# Patient Record
Sex: MALE | Race: ASIAN | Employment: OTHER | URBAN - METROPOLITAN AREA
[De-identification: names, ages, dates, MRNs, and addresses within clinical notes are randomized per-mention and may not be internally consistent; named-entity substitution may affect disease eponyms.]

---

## 2017-01-06 DIAGNOSIS — B35.1 ONYCHOMYCOSIS: ICD-10-CM

## 2017-01-06 DIAGNOSIS — B35.6 TINEA CRURIS: ICD-10-CM

## 2017-01-06 RX ORDER — ATORVASTATIN CALCIUM 20 MG/1
20 TABLET, FILM COATED ORAL DAILY
Qty: 90 TAB | Refills: 2 | Status: SHIPPED | OUTPATIENT
Start: 2017-01-06 | End: 2017-10-04 | Stop reason: SDUPTHER

## 2017-01-06 NOTE — TELEPHONE ENCOUNTER
This patient contacted office for the following prescriptions to be filled:    Medication requested :   Requested Prescriptions     Pending Prescriptions Disp Refills    atorvastatin (LIPITOR) 20 mg tablet       Sig: Take 1 Tab by mouth daily.        PCP: [facility] or Print: pharmacy  Mail order or Local pharmacy: [facility]    Scheduled appointment if not seen by current providers in office: last seen on 8/24/16 not due for fu until 8/2017 not scheduled

## 2017-06-01 ENCOUNTER — TELEPHONE (OUTPATIENT)
Dept: FAMILY MEDICINE CLINIC | Age: 67
End: 2017-06-01

## 2017-06-01 NOTE — TELEPHONE ENCOUNTER
Spoke with Yash Segura to inform him that I will fax all information over GL for them to schedule his colonoscopy.  Ye informed me that I took to long to call him back and he has already made his appointment with Dr. Maryann Dukes (GI)

## 2017-06-01 NOTE — TELEPHONE ENCOUNTER
Patient called this afternoon. He is requesting to get a colonoscopy done. Please call patient back at your earliest convenience.

## 2017-07-24 ENCOUNTER — TELEPHONE (OUTPATIENT)
Dept: FAMILY MEDICINE CLINIC | Age: 67
End: 2017-07-24

## 2017-07-24 DIAGNOSIS — Z12.5 PROSTATE CANCER SCREENING: Primary | ICD-10-CM

## 2017-07-24 NOTE — TELEPHONE ENCOUNTER
Pt is requesting a lab order for his physical on  8/28/2017. He would like to have the same orders that he has had in the past and the orders faxed to [phone] [facility] 2nd Watch. Please advise.

## 2017-07-25 ENCOUNTER — HOSPITAL ENCOUNTER (OUTPATIENT)
Dept: LAB | Age: 67
Discharge: HOME | End: 2017-07-25
Payer: COMMERCIAL

## 2017-07-25 ENCOUNTER — OFFICE VISIT (OUTPATIENT)
Dept: CARDIOLOGY CLINIC | Age: 67
End: 2017-07-25

## 2017-07-25 VITALS
DIASTOLIC BLOOD PRESSURE: 105 MMHG | WEIGHT: 147 LBS | HEART RATE: 72 BPM | HEIGHT: 64 IN | BODY MASS INDEX: 25.1 KG/M2 | SYSTOLIC BLOOD PRESSURE: 135 MMHG

## 2017-07-25 DIAGNOSIS — I10 ESSENTIAL HYPERTENSION: ICD-10-CM

## 2017-07-25 DIAGNOSIS — I25.10 ATHEROSCLEROSIS OF NATIVE CORONARY ARTERY OF NATIVE HEART WITHOUT ANGINA PECTORIS: Primary | ICD-10-CM

## 2017-07-25 DIAGNOSIS — Z98.61 POSTSURGICAL PERCUTANEOUS TRANSLUMINAL CORONARY ANGIOPLASTY STATUS: ICD-10-CM

## 2017-07-25 DIAGNOSIS — E78.5 HYPERLIPIDEMIA, UNSPECIFIED HYPERLIPIDEMIA TYPE: ICD-10-CM

## 2017-07-25 LAB
ALBUMIN SERPL BCP-MCNC: 3.7 G/DL (ref 3.4–5)
ALBUMIN/GLOB SERPL: 1 {RATIO} (ref 0.8–1.7)
ALP SERPL-CCNC: 74 U/L (ref 45–117)
ALT SERPL-CCNC: 12 U/L (ref 16–61)
ANION GAP BLD CALC-SCNC: 7 MMOL/L (ref 3–18)
APPEARANCE UR: CLEAR
AST SERPL W P-5'-P-CCNC: 18 U/L (ref 15–37)
BASOPHILS # BLD AUTO: 0 K/UL (ref 0–0.06)
BASOPHILS # BLD: 1 % (ref 0–2)
BILIRUB DIRECT SERPL-MCNC: 0.1 MG/DL (ref 0–0.2)
BILIRUB SERPL-MCNC: 0.5 MG/DL (ref 0.2–1)
BILIRUB UR QL: NEGATIVE
BUN SERPL-MCNC: 17 MG/DL (ref 7–18)
BUN/CREAT SERPL: 17 (ref 12–20)
CALCIUM SERPL-MCNC: 8.6 MG/DL (ref 8.5–10.1)
CHLORIDE SERPL-SCNC: 103 MMOL/L (ref 100–108)
CHOLEST SERPL-MCNC: 172 MG/DL
CO2 SERPL-SCNC: 28 MMOL/L (ref 21–32)
COLOR UR: YELLOW
CREAT SERPL-MCNC: 1.02 MG/DL (ref 0.6–1.3)
DIFFERENTIAL METHOD BLD: NORMAL
EOSINOPHIL # BLD: 0.1 K/UL (ref 0–0.4)
EOSINOPHIL NFR BLD: 2 % (ref 0–5)
ERYTHROCYTE [DISTWIDTH] IN BLOOD BY AUTOMATED COUNT: 13 % (ref 11.6–14.5)
GLOBULIN SER CALC-MCNC: 3.6 G/DL (ref 2–4)
GLUCOSE SERPL-MCNC: 79 MG/DL (ref 74–99)
GLUCOSE UR STRIP.AUTO-MCNC: NEGATIVE MG/DL
HCT VFR BLD AUTO: 43 % (ref 36–48)
HDLC SERPL-MCNC: 43 MG/DL (ref 40–60)
HDLC SERPL: 4 {RATIO} (ref 0–5)
HGB BLD-MCNC: 14.2 G/DL (ref 13–16)
HGB UR QL STRIP: NEGATIVE
KETONES UR QL STRIP.AUTO: NEGATIVE MG/DL
LDLC SERPL CALC-MCNC: 93 MG/DL (ref 0–100)
LEUKOCYTE ESTERASE UR QL STRIP.AUTO: NEGATIVE
LIPID PROFILE,FLP: ABNORMAL
LYMPHOCYTES # BLD AUTO: 26 % (ref 21–52)
LYMPHOCYTES # BLD: 1.5 K/UL (ref 0.9–3.6)
MCH RBC QN AUTO: 27.8 PG (ref 24–34)
MCHC RBC AUTO-ENTMCNC: 33 G/DL (ref 31–37)
MCV RBC AUTO: 84.3 FL (ref 74–97)
MONOCYTES # BLD: 0.5 K/UL (ref 0.05–1.2)
MONOCYTES NFR BLD AUTO: 9 % (ref 3–10)
NEUTS SEG # BLD: 3.5 K/UL (ref 1.8–8)
NEUTS SEG NFR BLD AUTO: 62 % (ref 40–73)
NITRITE UR QL STRIP.AUTO: NEGATIVE
PH UR STRIP: 7 [PH] (ref 5–8)
PLATELET # BLD AUTO: 198 K/UL (ref 135–420)
PMV BLD AUTO: 10.4 FL (ref 9.2–11.8)
POTASSIUM SERPL-SCNC: 4.7 MMOL/L (ref 3.5–5.5)
PROT SERPL-MCNC: 7.3 G/DL (ref 6.4–8.2)
PROT UR STRIP-MCNC: NEGATIVE MG/DL
RBC # BLD AUTO: 5.1 M/UL (ref 4.7–5.5)
SODIUM SERPL-SCNC: 138 MMOL/L (ref 136–145)
SP GR UR REFRACTOMETRY: 1.02 (ref 1–1.03)
TRIGL SERPL-MCNC: 180 MG/DL (ref ?–150)
TSH SERPL DL<=0.05 MIU/L-ACNC: 2.3 UIU/ML (ref 0.36–3.74)
UROBILINOGEN UR QL STRIP.AUTO: 0.2 EU/DL (ref 0.2–1)
VLDLC SERPL CALC-MCNC: 36 MG/DL
WBC # BLD AUTO: 5.6 K/UL (ref 4.6–13.2)

## 2017-07-25 PROCEDURE — 80061 LIPID PANEL: CPT | Performed by: INTERNAL MEDICINE

## 2017-07-25 PROCEDURE — 85025 COMPLETE CBC W/AUTO DIFF WBC: CPT | Performed by: INTERNAL MEDICINE

## 2017-07-25 PROCEDURE — 81003 URINALYSIS AUTO W/O SCOPE: CPT | Performed by: INTERNAL MEDICINE

## 2017-07-25 PROCEDURE — 80076 HEPATIC FUNCTION PANEL: CPT | Performed by: INTERNAL MEDICINE

## 2017-07-25 PROCEDURE — 84443 ASSAY THYROID STIM HORMONE: CPT | Performed by: INTERNAL MEDICINE

## 2017-07-25 PROCEDURE — 80048 BASIC METABOLIC PNL TOTAL CA: CPT | Performed by: INTERNAL MEDICINE

## 2017-07-25 PROCEDURE — 36415 COLL VENOUS BLD VENIPUNCTURE: CPT | Performed by: INTERNAL MEDICINE

## 2017-07-25 RX ORDER — IBUPROFEN 200 MG
200 CAPSULE ORAL DAILY
COMMUNITY
End: 2021-02-16

## 2017-07-25 RX ORDER — METHYLPREDNISOLONE 4 MG
500 TABLET, DOSE PACK ORAL DAILY
COMMUNITY
End: 2021-02-23

## 2017-07-25 RX ORDER — METOPROLOL SUCCINATE 50 MG/1
50 TABLET, EXTENDED RELEASE ORAL DAILY
Qty: 90 TAB | Refills: 3 | Status: SHIPPED | OUTPATIENT
Start: 2017-07-25 | End: 2018-08-04 | Stop reason: SDUPTHER

## 2017-07-25 NOTE — LETTER
Jarad Moser 1950 
 
7/25/2017 Dear MD Vinicio Monsalve MD Sylvie Bumps, MD Gregoria Lamy, MD Lauralyn All, MD 
 
I had the pleasure of evaluating  Mr. Lucy Leiva in office today. Below are the relevant portions of my assessment and plan of care. ICD-10-CM ICD-9-CM 1. Atherosclerosis of native coronary artery of native heart without angina pectoris I25.10 414.01 TSH 3RD GENERATION  
   CBC WITH AUTOMATED DIFF  
   METABOLIC PANEL, BASIC  
   HEPATIC FUNCTION PANEL  
   LIPID PANEL  
 stable 2. Postsurgical percutaneous transluminal coronary angioplasty status Z98.61 V45.82   
 stable 3. Essential hypertension I10 401.9 TSH 3RD GENERATION  
   CBC WITH AUTOMATED DIFF  
   METABOLIC PANEL, BASIC  
   HEPATIC FUNCTION PANEL  
   LIPID PANEL  
   URINALYSIS W/MICROSCOPIC  
 elevated add toprol 4. Hyperlipidemia, unspecified hyperlipidemia type E78.5 272.4   
 stable 
check lab Current Outpatient Prescriptions Medication Sig Dispense Refill  calcium citrate 200 mg (950 mg) tablet Take  by mouth daily.  glucosamine sulfate 500 mg tablet Take  by mouth.  multivitamin, tx-iron-ca-min (THERA-M W/ IRON) 9 mg iron-400 mcg tab tablet Take 1 Tab by mouth daily.  metoprolol succinate (TOPROL-XL) 50 mg XL tablet Take 1 Tab by mouth daily. 90 Tab 3  
 atorvastatin (LIPITOR) 20 mg tablet Take 1 Tab by mouth daily. 90 Tab 2  cyanocobalamin (VITAMIN B-12) 1,000 mcg sublingual tablet Take 1 Tab by mouth daily. 30 Tab 11  
 aspirin delayed-release 81 mg tablet Take 81 mg by mouth daily. Orders Placed This Encounter  TSH 3RD GENERATION Standing Status:   Future Standing Expiration Date:   8/24/2017  CBC WITH AUTOMATED DIFF Standing Status:   Future Standing Expiration Date:   8/24/2017  METABOLIC PANEL, BASIC Standing Status:   Future   Standing Expiration Date:   8/24/2017  
 HEPATIC FUNCTION PANEL  
 Standing Status:   Future Standing Expiration Date:   1/23/2018  LIPID PANEL Standing Status:   Future Standing Expiration Date:   1/23/2018  URINALYSIS W/MICROSCOPIC Standing Status:   Future Standing Expiration Date:   7/26/2018  calcium citrate 200 mg (950 mg) tablet Sig: Take  by mouth daily.  glucosamine sulfate 500 mg tablet Sig: Take  by mouth.  multivitamin, tx-iron-ca-min (THERA-M W/ IRON) 9 mg iron-400 mcg tab tablet Sig: Take 1 Tab by mouth daily.  metoprolol succinate (TOPROL-XL) 50 mg XL tablet Sig: Take 1 Tab by mouth daily. Dispense:  90 Tab Refill:  3 If you have questions, please do not hesitate to call me. I look forward to following Mr. Jessica Simmons along with you. Sincerely, Mac Joyce MD

## 2017-07-25 NOTE — TELEPHONE ENCOUNTER
PSA order faxed to [facility] lab [phone] for add on.

## 2017-07-25 NOTE — MR AVS SNAPSHOT
Visit Information Date & Time Provider Department Dept. Phone Encounter #  
 7/25/2017  9:15 AM Fern Hatfield MD [facility] [phone] [id number] Follow-up Instructions Return in about 1 year (around 7/25/2018). Your Appointments 7/25/2017  9:15 AM  
Follow Up with Fern Haftield MD  
[facility] ([facility] Appt Note: Per Dr Valentin Almanzar to be seen [address]  
  
   
 [facility] [address] [address] [address]  
  
    
 8/28/2017  9:00 AM  
COMPLETE PHYSICAL with Maury Correa MD  
[address] [facility] ([facility]) Appt Note: [address] [facility] [address]  
[address] [facility] [address] [facility] [address] Upcoming Health Maintenance Date Due COLONOSCOPY 12/10/1970 INFLUENZA AGE 9 TO ADULT 8/1/2017 DTaP/Tdap/Td series (2 - Td) 1/7/2025 Allergies as of 7/25/2017  Review Complete On: 7/25/2017 By: Fern Hatfield MD  
 No Known Allergies Current Immunizations  Reviewed on 8/24/2016 Name Date Tdap 1/7/2015 Zoster Vaccine, Live 1/22/2015 Not reviewed this visit You Were Diagnosed With   
  
 Codes Comments Atherosclerosis of native coronary artery of native heart without angina pectoris    -  Primary ICD-10-CM: I25.10 ICD-9-CM: 414.01 stable Postsurgical percutaneous transluminal coronary angioplasty status     ICD-10-CM: Z98.61 ICD-9-CM: V45.82 stable Essential hypertension     ICD-10-CM: I10 
ICD-9-CM: 401.9 elevated add toprol Hyperlipidemia, unspecified hyperlipidemia type     ICD-10-CM: E78.5 ICD-9-CM: 272.4 stable 
check lab Vitals BP Pulse Height(growth percentile) Weight(growth percentile) BMI Smoking Status (!) 135/105 72 5' 4\" (1.626 m) 147 lb (66.7 kg) 25.23 kg/m2 Never Smoker Vitals History BMI and BSA Data Body Mass Index Body Surface Area  
 25.23 kg/m 2 1.74 m 2 Preferred Pharmacy Pharmacy Name Phone [facility] #[id number] Stevo Simmons, [address] [facility] [facility] [phone] Your Updated Medication List  
  
   
This list is accurate as of: 7/25/17  8:15 AM.  Always use your most recent med list.  
  
  
  
  
 aspirin delayed-release 81 mg tablet Take 81 mg by mouth daily. atorvastatin 20 mg tablet Commonly known as:  LIPITOR Take 1 Tab by mouth daily. calcium citrate 200 mg (950 mg) tablet Take  by mouth daily. cyanocobalamin 1,000 mcg sublingual tablet Commonly known as:  VITAMIN B-12 Take 1 Tab by mouth daily. glucosamine sulfate 500 mg tablet Take  by mouth.  
  
 metoprolol succinate 50 mg XL tablet Commonly known as:  TOPROL-XL Take 1 Tab by mouth daily. multivitamin, tx-iron-ca-min 9 mg iron-400 mcg Tab tablet Commonly known as:  THERA-M w/ IRON Take 1 Tab by mouth daily. Prescriptions Sent to Pharmacy Refills  
 metoprolol succinate (TOPROL-XL) 50 mg XL tablet 3 Sig: Take 1 Tab by mouth daily. Class: Normal  
 Pharmacy: [facility]/pharmacy [id number] [facility], [address] [facility] #: [phone] Route: Oral  
  
Follow-up Instructions Return in about 1 year (around 7/25/2018). To-Do List   
 07/25/2017 Lab:  HEPATIC FUNCTION PANEL   
  
 07/25/2017 Lab:  LIPID PANEL   
  
 07/25/2017 Lab:  URINALYSIS W/MICROSCOPIC   
  
 08/01/2017 Lab:  CBC WITH AUTOMATED DIFF   
  
 08/01/2017 Lab:  METABOLIC PANEL, BASIC   
  
 08/01/2017 Lab:  TSH 3RD GENERATION \[facility]\"" & HEALTH SERVICES! Dear Trupti Guadalupe: Thank you for requesting a [employer] account. Our records indicate that you have previously registered for a [employer] account but its currently inactive. Please call our [employer] support line at [phone]. Additional Information If you have questions, please visit the Frequently Asked Questions section of the [email or web address] website at [email or web address] Remember, [employer] is NOT to be used for urgent needs. For medical emergencies, dial 911. Now available from your iPhone and Android! Please provide this summary of care documentation to your next provider. Your primary care clinician is listed as Jackie Cardoza. If you have any questions after today's visit, please call [phone].

## 2017-07-25 NOTE — PROGRESS NOTES
HISTORY OF PRESENT ILLNESS  Birgit Cobos is a 61 y.o. male. HPI Comments: Patient with MATY Wolff 53. On follow up patient denies any chest pains,sob, palpitation or other significant symptoms. Hypertension   The history is provided by the patient. This is a new problem. The problem occurs constantly. The problem has not changed since onset. Associated symptoms include headaches. Pertinent negatives include no chest pain, no abdominal pain and no shortness of breath. Nothing aggravates the symptoms. Review of Systems   Constitutional: Negative for chills and fever. HENT: Negative for nosebleeds. Eyes: Negative for blurred vision and double vision. Respiratory: Negative for cough, hemoptysis, sputum production, shortness of breath and wheezing. Cardiovascular: Negative for chest pain, palpitations, orthopnea, claudication, leg swelling and PND. Gastrointestinal: Negative for abdominal pain, heartburn, nausea and vomiting. Musculoskeletal: Negative for myalgias. Skin: Negative for rash. Neurological: Positive for headaches. Negative for dizziness and weakness. Endo/Heme/Allergies: Does not bruise/bleed easily. No family history on file.     Past Medical History:   Diagnosis Date    Coronary atherosclerosis of native coronary artery     No angina, H/O stent     Elevated blood pressure reading without diagnosis of hypertension     Monitor    H/O colonoscopy at 58yo    patient report - unable to get records    Other and unspecified hyperlipidemia     On Lipitor    Postsurgical percutaneous transluminal coronary angioplasty status        Past Surgical History:   Procedure Laterality Date    HX CORONARY STENT PLACEMENT  2003    LAD/JENY       Social History   Substance Use Topics    Smoking status: Never Smoker    Smokeless tobacco: Never Used    Alcohol use 0.0 oz/week     0 Standard drinks or equivalent per week      Comment: occasional       No Known Allergies    Current Outpatient Prescriptions   Medication Sig    calcium citrate 200 mg (950 mg) tablet Take  by mouth daily.  glucosamine sulfate 500 mg tablet Take  by mouth.  multivitamin, tx-iron-ca-min (THERA-M W/ IRON) 9 mg iron-400 mcg tab tablet Take 1 Tab by mouth daily.  metoprolol succinate (TOPROL-XL) 50 mg XL tablet Take 1 Tab by mouth daily.  atorvastatin (LIPITOR) 20 mg tablet Take 1 Tab by mouth daily.  cyanocobalamin (VITAMIN B-12) 1,000 mcg sublingual tablet Take 1 Tab by mouth daily.  aspirin delayed-release 81 mg tablet Take 81 mg by mouth daily. No current facility-administered medications for this visit. Visit Vitals    BP (!) 135/105    Pulse 72    Ht 5' 4\" (1.626 m)    Wt 66.7 kg (147 lb)    BMI 25.23 kg/m2         Physical Exam   Constitutional: He is oriented to person, place, and time. He appears well-developed and well-nourished. HENT:   Head: Normocephalic and atraumatic. Eyes: Conjunctivae are normal.   Neck: Neck supple. No JVD present. No tracheal deviation present. No thyromegaly present. Cardiovascular: Normal rate, regular rhythm and normal heart sounds. Exam reveals no gallop and no friction rub. No murmur heard. Pulmonary/Chest: Breath sounds normal. No respiratory distress. He has no wheezes. He has no rales. He exhibits no tenderness. Abdominal: Soft. There is no tenderness. Musculoskeletal: He exhibits no edema. Neurological: He is alert and oriented to person, place, and time. Skin: Skin is warm and dry. Psychiatric: He has a normal mood and affect. Mr. Jose Elias Stewart has a reminder for a \"due or due soon\" health maintenance. I have asked that he contact his primary care provider for follow-up on this health maintenance. NUCLEAR [email or web address]     Findings:   1. Stress images reveal normal Myoview distrubution in all the LV segments in short axis, vertical and horizontal long axis views.    2. Resting images have a normal uptake. 3. Gated images reveal normal wall motion and the ejection fraction is calculated to be 86%. Conclusion:   1. Normal perfusion scan. 2. Normal wall motion and ejection fraction. 3. Low risk scan. Assessment       ICD-10-CM ICD-9-CM    1. Atherosclerosis of native coronary artery of native heart without angina pectoris I25.10 414.01 TSH 3RD GENERATION      CBC WITH AUTOMATED DIFF      METABOLIC PANEL, BASIC      HEPATIC FUNCTION PANEL      LIPID PANEL    stable   2. Postsurgical percutaneous transluminal coronary angioplasty status Z98.61 V45.82     stable   3. Essential hypertension I10 401.9 TSH 3RD GENERATION      CBC WITH AUTOMATED DIFF      METABOLIC PANEL, BASIC      HEPATIC FUNCTION PANEL      LIPID PANEL      URINALYSIS W/MICROSCOPIC    elevated add toprol   4. Hyperlipidemia, unspecified hyperlipidemia type E78.5 272.4     stable  check lab       There are no discontinued medications. Orders Placed This Encounter    TSH 3RD GENERATION     Standing Status:   Future     Standing Expiration Date:   8/24/2017    CBC WITH AUTOMATED DIFF     Standing Status:   Future     Standing Expiration Date:   2/94/3940    METABOLIC PANEL, BASIC     Standing Status:   Future     Standing Expiration Date:   8/24/2017    HEPATIC FUNCTION PANEL     Standing Status:   Future     Standing Expiration Date:   1/23/2018    LIPID PANEL     Standing Status:   Future     Standing Expiration Date:   1/23/2018    URINALYSIS W/MICROSCOPIC     Standing Status:   Future     Standing Expiration Date:   7/26/2018    metoprolol succinate (TOPROL-XL) 50 mg XL tablet     Sig: Take 1 Tab by mouth daily. Dispense:  90 Tab     Refill:  3       Follow-up Disposition:  Return in about 1 year (around 7/25/2018).

## 2017-07-27 LAB
PSA SERPL-MCNC: 1.7 NG/ML (ref 0–4)
REFLEX CRITERIA: NORMAL

## 2017-07-28 ENCOUNTER — TELEPHONE (OUTPATIENT)
Dept: CARDIOLOGY CLINIC | Age: 67
End: 2017-07-28

## 2017-07-28 NOTE — TELEPHONE ENCOUNTER
----- Message from Sasha Verduzco MD sent at 7/26/2017  7:49 AM EDT -----  Lab/test  reviewed  High tg  Low carb diet

## 2017-07-28 NOTE — TELEPHONE ENCOUNTER
Patient called and labs discussed, low carb diet discussed. He voices understanding and acceptance of this advice and will call back if any further questions or concerns.

## 2017-07-28 NOTE — TELEPHONE ENCOUNTER
----- Message from Dennis Perdue MD sent at 7/26/2017  7:49 AM EDT -----  Lab/test  reviewed  High tg  Low carb diet

## 2017-09-07 ENCOUNTER — OFFICE VISIT (OUTPATIENT)
Dept: FAMILY MEDICINE CLINIC | Age: 67
End: 2017-09-07

## 2017-09-07 VITALS
HEIGHT: 64 IN | BODY MASS INDEX: 26.29 KG/M2 | TEMPERATURE: 98 F | RESPIRATION RATE: 14 BRPM | WEIGHT: 154 LBS | HEART RATE: 68 BPM | DIASTOLIC BLOOD PRESSURE: 78 MMHG | SYSTOLIC BLOOD PRESSURE: 120 MMHG | OXYGEN SATURATION: 99 %

## 2017-09-07 DIAGNOSIS — Z00.00 ROUTINE MEDICAL EXAM: Primary | ICD-10-CM

## 2017-09-07 DIAGNOSIS — I10 ESSENTIAL HYPERTENSION: ICD-10-CM

## 2017-09-07 DIAGNOSIS — I25.10 ATHEROSCLEROSIS OF NATIVE CORONARY ARTERY OF NATIVE HEART WITHOUT ANGINA PECTORIS: ICD-10-CM

## 2017-09-07 DIAGNOSIS — E78.5 HYPERLIPIDEMIA, UNSPECIFIED HYPERLIPIDEMIA TYPE: ICD-10-CM

## 2017-09-07 DIAGNOSIS — Z23 ENCOUNTER FOR IMMUNIZATION: ICD-10-CM

## 2017-09-07 NOTE — PATIENT INSTRUCTIONS
Well Visit, Men 48 to 72: Care Instructions  Your Care Instructions  Physical exams can help you stay healthy. Your doctor has checked your overall health and may have suggested ways to take good care of yourself. He or she also may have recommended tests. At home, you can help prevent illness with healthy eating, regular exercise, and other steps. Follow-up care is a key part of your treatment and safety. Be sure to make and go to all appointments, and call your doctor if you are having problems. It's also a good idea to know your test results and keep a list of the medicines you take. How can you care for yourself at home? · Reach and stay at a healthy weight. This will lower your risk for many problems, such as obesity, diabetes, heart disease, and high blood pressure. · Get at least 30 minutes of exercise on most days of the week. Walking is a good choice. You also may want to do other activities, such as running, swimming, cycling, or playing tennis or team sports. · Do not smoke. Smoking can make health problems worse. If you need help quitting, talk to your doctor about stop-smoking programs and medicines. These can increase your chances of quitting for good. · Protect your skin from too much sun. When you're outdoors from 10 a.m. to 4 p.m., stay in the shade or cover up with clothing and a hat with a wide brim. Wear sunglasses that block UV rays. Even when it's cloudy, put broad-spectrum sunscreen (SPF 30 or higher) on any exposed skin. · See a dentist one or two times a year for checkups and to have your teeth cleaned. · Wear a seat belt in the car. · Limit alcohol to 2 drinks a day. Too much alcohol can cause health problems. Follow your doctor's advice about when to have certain tests. These tests can spot problems early. · Cholesterol.  Your doctor will tell you how often to have this done based on your overall health and other things that can increase your risk for heart attack and stroke. · Blood pressure. Have your blood pressure checked during a routine doctor visit. Your doctor will tell you how often to check your blood pressure based on your age, your blood pressure results, and other factors. · Prostate exam. Talk to your doctor about whether you should have a blood test (called a PSA test) for prostate cancer. Experts disagree on whether men should have this test. Some experts recommend that you discuss the benefits and risks of the test with your doctor. · Diabetes. Ask your doctor whether you should have tests for diabetes. · Vision. Some experts recommend that you have yearly exams for glaucoma and other age-related eye problems starting at age 48. · Hearing. Tell your doctor if you notice any change in your hearing. You can have tests to find out how well you hear. · Colon cancer. You should begin tests for colon cancer at age 48. You may have one of several tests. Your doctor will tell you how often to have tests based on your age and risk. Risks include whether you already had a precancerous polyp removed from your colon or whether your parent, brother, sister, or child has had colon cancer. · Heart attack and stroke risk. At least every 4 to 6 years, you should have your risk for heart attack and stroke assessed. Your doctor uses factors such as your age, blood pressure, cholesterol, and whether you smoke or have diabetes to show what your risk for a heart attack or stroke is over the next 10 years. · Abdominal aortic aneurysm. Ask your doctor whether you should have a test to check for an aneurysm. You may need a test if you ever smoked or if your parent, brother, sister, or child has had an aneurysm. When should you call for help? Watch closely for changes in your health, and be sure to contact your doctor if you have any problems or symptoms that concern you. Where can you learn more? Go to http://kevin-isabel.info/.   Enter H041 in the search box to learn more about \"Well Visit, Men 48 to 72: Care Instructions. \"  Current as of: July 19, 2016  Content Version: 11.3  © 7941-9390 [employer]. Care instructions adapted under license by [employer] (which disclaims liability or warranty for this information). If you have questions about a medical condition or this instruction, always ask your healthcare professional. [facility] [address] any warranty or liability for your use of this information. Influenza (Flu) Vaccine (Inactivated or Recombinant): What You Need to Know  Why get vaccinated? Influenza (\"flu\") is a contagious disease that spreads around the [facility] every winter, usually between October and May. Flu is caused by influenza viruses and is spread mainly by coughing, sneezing, and close contact. Anyone can get flu. Flu strikes suddenly and can last several days. Symptoms vary by age, but can include:  · Fever/chills. · Sore throat. · Muscle aches. · Fatigue. · Cough. · Headache. · Runny or stuffy nose. Flu can also lead to pneumonia and blood infections, and cause diarrhea and seizures in children. If you have a medical condition, such as heart or lung disease, flu can make it worse. Flu is more dangerous for some people. Infants and young children, people 72years of age and older, pregnant women, and people with certain health conditions or a weakened immune system are at greatest risk. Each year thousands of people in the [facility] die from flu, and many more are hospitalized. Flu vaccine can:  · Keep you from getting flu. · Make flu less severe if you do get it. · Keep you from spreading flu to your family and other people. Inactivated and recombinant flu vaccines  A dose of flu vaccine is recommended every flu season. Children 6 months through 6years of age may need two doses during the same flu season. Everyone else needs only one dose each flu season.   Some inactivated flu vaccines contain a very small amount of a mercury-based preservative called thimerosal. Studies have not shown thimerosal in vaccines to be harmful, but flu vaccines that do not contain thimerosal are available. There is no live flu virus in flu shots. They cannot cause the flu. There are many flu viruses, and they are always changing. Each year a new flu vaccine is made to protect against three or four viruses that are likely to cause disease in the upcoming flu season. But even when the vaccine doesn't exactly match these viruses, it may still provide some protection. Flu vaccine cannot prevent:  · Flu that is caused by a virus not covered by the vaccine. · Illnesses that look like flu but are not. Some people should not get this vaccine  Tell the person who is giving you the vaccine:  · If you have any severe (life-threatening) allergies. If you ever had a life-threatening allergic reaction after a dose of flu vaccine, or have a severe allergy to any part of this vaccine, you may be advised not to get vaccinated. Most, but not all, types of flu vaccine contain a small amount of egg protein. · If you ever had Guillain-Barré syndrome (also called GBS) Some people with a history of GBS should not get this vaccine. This should be discussed with your doctor. · If you are not feeling well. It is usually okay to get flu vaccine when you have a mild illness, but you might be asked to come back when you feel better. Risks of a vaccine reaction  With any medicine, including vaccines, there is a chance of reactions. These are usually mild and go away on their own, but serious reactions are also possible. Most people who get a flu shot do not have any problems with it.   Minor problems following a flu shot include:  · Soreness, redness, or swelling where the shot was given  · Hoarseness  · Sore, red or itchy eyes  · Cough  · Fever  · Aches  · Headache  · Itching  · Fatigue  If these problems occur, they usually begin soon after the shot and last 1 or 2 days. More serious problems following a flu shot can include the following:  · There may be a small increased risk of Guillain-Barré Syndrome (GBS) after inactivated flu vaccine. This risk has been estimated at 1 or 2 additional cases per million people vaccinated. This is much lower than the risk of severe complications from flu, which can be prevented by flu vaccine. · [facility] who get the flu shot along with pneumococcal vaccine (PCV13) and/or DTaP vaccine at the same time might be slightly more likely to have a seizure caused by fever. Ask your doctor for more information. Tell your doctor if a child who is getting flu vaccine has ever had a seizure  Problems that could happen after any injected vaccine:  · People sometimes faint after a medical procedure, including vaccination. Sitting or lying down for about 15 minutes can help prevent fainting, and injuries caused by a fall. Tell your doctor if you feel dizzy, or have vision changes or ringing in the ears. · Some people get severe pain in the shoulder and have difficulty moving the arm where a shot was given. This happens very rarely. · Any medication can cause a severe allergic reaction. Such reactions from a vaccine are very rare, estimated at about 1 in a million doses, and would happen within a few minutes to a few hours after the vaccination. As with any medicine, there is a very remote chance of a vaccine causing a serious injury or death. The safety of vaccines is always being monitored. For more information, visit: www.cdc.gov/vaccinesafety/. What if there is a serious reaction? What should I look for? · Look for anything that concerns you, such as signs of a severe allergic reaction, very high fever, or unusual behavior.   Signs of a severe allergic reaction can include hives, swelling of the face and throat, difficulty breathing, a fast heartbeat, dizziness, and weakness  usually within a few minutes to a few hours after the vaccination. What should I do? · If you think it is a severe allergic reaction or other emergency that can't wait, call 9-1-1 and get the person to the nearest hospital. Otherwise, call your doctor. · Reactions should be reported to the \"Vaccine Adverse Event Reporting System\" (VAERS). Your doctor should file this report, or you can do it yourself through the VAERS website at www.vaers. [facility].gov, or by calling 4-235.714.2616. VAERS does not give medical advice. The National Vaccine Injury Compensation Program  The National Vaccine Injury Compensation Program (VICP) is a federal program that was created to compensate people who may have been injured by certain vaccines. Persons who believe they may have been injured by a vaccine can learn about the program and about filing a claim by calling 5-849.189.8578 or visiting the [employer] website at www.[facility].gov/vaccinecompensation. There is a time limit to file a claim for compensation. How can I learn more? · Ask your healthcare provider. He or she can give you the vaccine package insert or suggest other sources of information. · Call your local or state health department. · Contact the Centers for Disease Control and Prevention (CDC):  ¨ Call 5-235.174.3088 (1-800-CDC-INFO) or  ¨ Visit CDC's website at www.cdc.gov/flu  Vaccine Information Statement  Inactivated Influenza Vaccine  8/7/2015)  [address] LAMIN [address] [id number]-53  Department of Health and Human Services  Centers for Disease Control and Prevention  Many Vaccine Information Statements are available in [place] and other languages. See www.immunize.org/vis. Muchas hojas de información sobre vacunas están disponibles en español y en otros idiomas. Visite www.immunize.org/vis. Care instructions adapted under license by [employer] (which disclaims liability or warranty for this information).  If you have questions about a medical condition or this instruction, always ask your healthcare professional. [address] any warranty or liability for your use of this information.

## 2017-09-07 NOTE — MR AVS SNAPSHOT
Visit Information Date & Time Provider Department Dept. Phone Encounter #  
 9/7/2017  8:15 AM Magdalena Roper, [address] [id number] Follow-up Instructions Return in about 1 year (around 9/7/2018) for physical.  
  
Your Appointments 7/17/2018  8:30 AM  
Follow Up with Kaye El MD  
[facility] ([address] [facility]) Appt Note: 1 year follow up/labs [address] [place] [address] [address]  
  
   
 [address] [phone] [address] [address] Upcoming Health Maintenance Date Due INFLUENZA AGE 9 TO ADULT 8/1/2017 COLONOSCOPY 8/21/2020 DTaP/Tdap/Td series (2 - Td) 1/7/2025 Allergies as of 9/7/2017  Review Complete On: 9/7/2017 By: Magdalena Roper MD  
 No Known Allergies Current Immunizations  Reviewed on 8/24/2016 Name Date Influenza Vaccine (Quad) PF  Incomplete Tdap 1/7/2015 Zoster Vaccine, Live 1/22/2015 Not reviewed this visit You Were Diagnosed With   
  
 Codes Comments Routine medical exam    -  Primary ICD-10-CM: Z00.00 ICD-9-CM: V70.0 Atherosclerosis of native coronary artery of native heart without angina pectoris     ICD-10-CM: I25.10 ICD-9-CM: 414.01 Essential hypertension     ICD-10-CM: I10 
ICD-9-CM: 401.9 Hyperlipidemia, unspecified hyperlipidemia type     ICD-10-CM: E78.5 ICD-9-CM: 272.4 BMI 26.0-26.9,adult     ICD-10-CM: Q07.08 
ICD-9-CM: V85.22 Encounter for immunization     ICD-10-CM: H70 ICD-9-CM: V03.89 Vitals BP Pulse Temp Resp Height(growth percentile) Weight(growth percentile) 120/78 (BP 1 Location: Right arm, BP Patient Position: Sitting) 68 98 °F (36.7 °C) (Oral) 14 5' 4\" (1.626 m) 154 lb (69.9 kg) SpO2 BMI Smoking Status 99% 26.43 kg/m2 Never Smoker Vitals History BMI and BSA Data Body Mass Index Body Surface Area  
 26.43 kg/m 2 1.78 m 2 Preferred Pharmacy Pharmacy Name Phone [facility]/PHARMACY #[id number] [facility], [facility] [facility] [phone] Your Updated Medication List  
  
   
This list is accurate as of: 9/7/17  8:51 AM.  Always use your most recent med list.  
  
  
  
  
 aspirin delayed-release 81 mg tablet Take 81 mg by mouth daily. atorvastatin 20 mg tablet Commonly known as:  LIPITOR Take 1 Tab by mouth daily. calcium citrate 200 mg (950 mg) tablet Take 200 mg by mouth daily. cyanocobalamin 1,000 mcg sublingual tablet Commonly known as:  VITAMIN B-12 Take 1 Tab by mouth daily. glucosamine sulfate 500 mg tablet Take 500 mg by mouth daily. metoprolol succinate 50 mg XL tablet Commonly known as:  TOPROL-XL Take 1 Tab by mouth daily. multivitamin, tx-iron-ca-min 9 mg iron-400 mcg Tab tablet Commonly known as:  THERA-M w/ IRON Take 1 Tab by mouth daily. We Performed the Following INFLUENZA VIRUS VAC QUAD,SPLIT,PRESV FREE SYRINGE 3/> YRS IM [id number] CPT(R)] [place] IMMUNIZ ADMIN,1 SINGLE/COMB VAC/TOXOID [id number] CPT(R)] Follow-up Instructions Return in about 1 year (around 9/7/2018) for physical.  
  
  
Patient Instructions Well Visit, Men 48 to 72: Care Instructions Your Care Instructions Physical exams can help you stay healthy. Your doctor has checked your overall health and may have suggested ways to take good care of yourself. He or she also may have recommended tests. At home, you can help prevent illness with healthy eating, regular exercise, and other steps. Follow-up care is a key part of your treatment and safety. Be sure to make and go to all appointments, and call your doctor if you are having problems. It's also a good idea to know your test results and keep a list of the medicines you take. How can you care for yourself at home? · Reach and stay at a healthy weight.  This will lower your risk for many problems, such as obesity, diabetes, heart disease, and high blood pressure. · Get at least 30 minutes of exercise on most days of the week. Walking is a good choice. You also may want to do other activities, such as running, swimming, cycling, or playing tennis or team sports. · Do not smoke. Smoking can make health problems worse. If you need help quitting, talk to your doctor about stop-smoking programs and medicines. These can increase your chances of quitting for good. · Protect your skin from too much sun. When you're outdoors from 10 a.m. to 4 p.m., stay in the shade or cover up with clothing and a hat with a wide brim. Wear sunglasses that block UV rays. Even when it's cloudy, put broad-spectrum sunscreen (SPF 30 or higher) on any exposed skin. · See a dentist one or two times a year for checkups and to have your teeth cleaned. · Wear a seat belt in the car. · Limit alcohol to 2 drinks a day. Too much alcohol can cause health problems. Follow your doctor's advice about when to have certain tests. These tests can spot problems early. · Cholesterol. Your doctor will tell you how often to have this done based on your overall health and other things that can increase your risk for heart attack and stroke. · Blood pressure. Have your blood pressure checked during a routine doctor visit. Your doctor will tell you how often to check your blood pressure based on your age, your blood pressure results, and other factors. · Prostate exam. Talk to your doctor about whether you should have a blood test (called a PSA test) for prostate cancer. Experts disagree on whether men should have this test. Some experts recommend that you discuss the benefits and risks of the test with your doctor. · Diabetes. Ask your doctor whether you should have tests for diabetes. · Vision. Some experts recommend that you have yearly exams for glaucoma and other age-related eye problems starting at age 48. · Hearing. Tell your doctor if you notice any change in your hearing. You can have tests to find out how well you hear. · Colon cancer. You should begin tests for colon cancer at age 48. You may have one of several tests. Your doctor will tell you how often to have tests based on your age and risk. Risks include whether you already had a precancerous polyp removed from your colon or whether your parent, brother, sister, or child has had colon cancer. · Heart attack and stroke risk. At least every 4 to 6 years, you should have your risk for heart attack and stroke assessed. Your doctor uses factors such as your age, blood pressure, cholesterol, and whether you smoke or have diabetes to show what your risk for a heart attack or stroke is over the next 10 years. · Abdominal aortic aneurysm. Ask your doctor whether you should have a test to check for an aneurysm. You may need a test if you ever smoked or if your parent, brother, sister, or child has had an aneurysm. When should you call for help? Watch closely for changes in your health, and be sure to contact your doctor if you have any problems or symptoms that concern you. Where can you learn more? Go to http://kevin-isabel.info/. Enter F301 in the search box to learn more about \"Well Visit, Men 48 to 72: Care Instructions. \" Current as of: July 19, 2016 Content Version: 11.3 © 8331-4481 [employer]. Care instructions adapted under license by [employer] (which disclaims liability or warranty for this information). If you have questions about a medical condition or this instruction, always ask your healthcare professional. [place] [address] any warranty or liability for your use of this information. Influenza (Flu) Vaccine (Inactivated or Recombinant): What You Need to Know Why get vaccinated?  
Influenza (\"flu\") is a contagious disease that spreads around the [facility] every winter, usually between October and May. Flu is caused by influenza viruses and is spread mainly by coughing, sneezing, and close contact. Anyone can get flu. Flu strikes suddenly and can last several days. Symptoms vary by age, but can include: · Fever/chills. · Sore throat. · Muscle aches. · Fatigue. · Cough. · Headache. · Runny or stuffy nose. Flu can also lead to pneumonia and blood infections, and cause diarrhea and seizures in children. If you have a medical condition, such as heart or lung disease, flu can make it worse. Flu is more dangerous for some people. Infants and young children, people 72years of age and older, pregnant women, and people with certain health conditions or a weakened immune system are at greatest risk. Each year thousands of people in the [facility] die from flu, and many more are hospitalized. Flu vaccine can: · Keep you from getting flu. · Make flu less severe if you do get it. · Keep you from spreading flu to your family and other people. Inactivated and recombinant flu vaccines A dose of flu vaccine is recommended every flu season. Children 6 months through 6years of age may need two doses during the same flu season. Everyone else needs only one dose each flu season. Some inactivated flu vaccines contain a very small amount of a mercury-based preservative called thimerosal. Studies have not shown thimerosal in vaccines to be harmful, but flu vaccines that do not contain thimerosal are available. There is no live flu virus in flu shots. They cannot cause the flu. There are many flu viruses, and they are always changing. Each year a new flu vaccine is made to protect against three or four viruses that are likely to cause disease in the upcoming flu season. But even when the vaccine doesn't exactly match these viruses, it may still provide some protection. Flu vaccine cannot prevent: · Flu that is caused by a virus not covered by the vaccine. · Illnesses that look like flu but are not. Some people should not get this vaccine Tell the person who is giving you the vaccine: · If you have any severe (life-threatening) allergies. If you ever had a life-threatening allergic reaction after a dose of flu vaccine, or have a severe allergy to any part of this vaccine, you may be advised not to get vaccinated. Most, but not all, types of flu vaccine contain a small amount of egg protein. · If you ever had Guillain-Barré syndrome (also called GBS) Some people with a history of GBS should not get this vaccine. This should be discussed with your doctor. · If you are not feeling well. It is usually okay to get flu vaccine when you have a mild illness, but you might be asked to come back when you feel better. Risks of a vaccine reaction With any medicine, including vaccines, there is a chance of reactions. These are usually mild and go away on their own, but serious reactions are also possible. Most people who get a flu shot do not have any problems with it. Minor problems following a flu shot include: · Soreness, redness, or swelling where the shot was given · Hoarseness · Sore, red or itchy eyes · Cough · Fever · Aches · Headache · Itching · Fatigue If these problems occur, they usually begin soon after the shot and last 1 or 2 days. More serious problems following a flu shot can include the following: · There may be a small increased risk of Guillain-Barré Syndrome (GBS) after inactivated flu vaccine. This risk has been estimated at 1 or 2 additional cases per million people vaccinated. This is much lower than the risk of severe complications from flu, which can be prevented by flu vaccine. · Georgiana Rayaes children who get the flu shot along with pneumococcal vaccine (PCV13) and/or DTaP vaccine at the same time might be slightly more likely to have a seizure caused by fever. Ask your doctor for more information. Tell your doctor if a child who is getting flu vaccine has ever had a seizure Problems that could happen after any injected vaccine: · People sometimes faint after a medical procedure, including vaccination. Sitting or lying down for about 15 minutes can help prevent fainting, and injuries caused by a fall. Tell your doctor if you feel dizzy, or have vision changes or ringing in the ears. · Some people get severe pain in the shoulder and have difficulty moving the arm where a shot was given. This happens very rarely. · Any medication can cause a severe allergic reaction. Such reactions from a vaccine are very rare, estimated at about 1 in a million doses, and would happen within a few minutes to a few hours after the vaccination. As with any medicine, there is a very remote chance of a vaccine causing a serious injury or death. The safety of vaccines is always being monitored. For more information, visit: www.cdc.gov/vaccinesafety/. What if there is a serious reaction? What should I look for? · Look for anything that concerns you, such as signs of a severe allergic reaction, very high fever, or unusual behavior. Signs of a severe allergic reaction can include hives, swelling of the face and throat, difficulty breathing, a fast heartbeat, dizziness, and weakness  usually within a few minutes to a few hours after the vaccination. What should I do? · If you think it is a severe allergic reaction or other emergency that can't wait, call 9-1-1 and get the person to the nearest hospital. Otherwise, call your doctor. · Reactions should be reported to the \"Vaccine Adverse Event Reporting System\" (VAERS). Your doctor should file this report, or you can do it yourself through the VAERS website at www.vaers. hhs.gov, or by calling 1-597.254.9696. VAERS does not give medical advice.  
The Consolidated Stephen Vaccine Injury Compensation Program 
The [employer]olidated [employer] Vaccine Injury Compensation Program (VICP) is a federal program that was created to compensate people who may have been injured by certain vaccines. Persons who believe they may have been injured by a vaccine can learn about the program and about filing a claim by calling 9-692.249.2459 or visiting the [employer] website at www.[facility].gov/vaccinecompensation. There is a time limit to file a claim for compensation. How can I learn more? · Ask your healthcare provider. He or she can give you the vaccine package insert or suggest other sources of information. · Call your local or state health department. · Contact the Centers for Disease Control and Prevention (CDC): 
¨ Call 3-498.490.2462 (1-800-CDC-INFO) or ¨ Visit CDC's website at www.cdc.gov/flu Vaccine Information Statement Inactivated Influenza Vaccine 8/7/2015) 42 LAMIN Childs 663IU-22 Department of Health and [employer] Centers for Disease Control and Prevention Many Vaccine Information Statements are available in Japanese and other languages. See www.immunize.org/vis. Muchas hojas de información sobre vacunas están disponibles en español y en otros idiomas. Visite www.immunize.org/vis. Care instructions adapted under license by [employer] (which disclaims liability or warranty for this information). If you have questions about a medical condition or this instruction, always ask your healthcare professional. [address] any warranty or liability for your use of this information. Introducing [facility]! Dear Adonay Hyatt: Thank you for requesting a [employer] account. Our records indicate that you have previously registered for a Teacher[employer] account but its currently inactive. Please call our [employer] support line at [phone]. Additional Information If you have questions, please visit the Frequently Asked Questions section of the [employer] website at [email or web address] Remember, [employer] is NOT to be used for urgent needs.  For medical emergencies, dial 911. Now available from your iPhone and Android! Please provide this summary of care documentation to your next provider. Your primary care clinician is listed as Elizabeth Rodriguez. If you have any questions after today's visit, please call [phone].

## 2017-09-07 NOTE — PROGRESS NOTES
Chief Complaint   Patient presents with    Physical    Elevated Blood Pressure    Cholesterol Problem     1. Have you been to the ER, urgent care clinic since your last visit? Hospitalized since your last visit? No    2. Have you seen or consulted any other health care providers outside of the [address] [place] [address] since your last visit? Include any pap smears or colon screening. Yes Where: Dr. Carrie Pacheco (card) and Dr. Karey Hassan (gastro)    Physician order obtained. Patient completed adult immunization consent form. Allergies, contraindications and recommendations reviewed with patient. Seasonal influenza vaccine administered IM left deltoid. Patient tolerated well. Patient remained in office for 15 minutes after injection and no adverse reactions were noted.

## 2017-09-07 NOTE — PROGRESS NOTES
Subjective:   Edda Bishop is a 61 y.o. male presenting for his annual checkup. ROS:  Feeling well. No dyspnea or chest pain on exertion. No abdominal pain, change in bowel habits, black or bloody stools. No urinary tract or prostatic symptoms. No neurological complaints. Specific concerns today:   No new concerns. Current Outpatient Prescriptions   Medication Sig Dispense Refill    calcium citrate 200 mg (950 mg) tablet Take 200 mg by mouth daily.  multivitamin, tx-iron-ca-min (THERA-M W/ IRON) 9 mg iron-400 mcg tab tablet Take 1 Tab by mouth daily.  metoprolol succinate (TOPROL-XL) 50 mg XL tablet Take 1 Tab by mouth daily. 90 Tab 3    atorvastatin (LIPITOR) 20 mg tablet Take 1 Tab by mouth daily. 90 Tab 2    cyanocobalamin (VITAMIN B-12) 1,000 mcg sublingual tablet Take 1 Tab by mouth daily. 30 Tab 11    aspirin delayed-release 81 mg tablet Take 81 mg by mouth daily.  glucosamine sulfate 500 mg tablet Take 500 mg by mouth daily. No Known Allergies  Past Medical History:   Diagnosis Date    Coronary atherosclerosis of native coronary artery     No angina, H/O stent     H/O colonoscopy at 53yo    patient report - unable to get records    H/O colonoscopy with polypectomy 08/2017    polyp - follow-up 3 years    HTN (hypertension)     Other and unspecified hyperlipidemia     On Lipitor    Postsurgical percutaneous transluminal coronary angioplasty status      Past Surgical History:   Procedure Laterality Date    HX CORONARY STENT PLACEMENT  2003    LAD/JENY     History reviewed. No pertinent family history.   Social History   Substance Use Topics    Smoking status: Never Smoker    Smokeless tobacco: Never Used    Alcohol use 0.0 oz/week     0 Standard drinks or equivalent per week      Comment: occasional      Objective:     Visit Vitals    /78 (BP 1 Location: Right arm, BP Patient Position: Sitting)    Pulse 68    Temp 98 °F (36.7 °C) (Oral)    Resp 14    Ht 5' 4\" (1.626 m)    Wt 154 lb (69.9 kg)    SpO2 99%    BMI 26.43 kg/m2     The patient appears well, alert, oriented x 3, in no distress. ENT normal.  Neck supple. No adenopathy or thyromegaly. MARQUISE. Lungs are clear, good air entry, no wheezes, rhonchi or rales. S1 and S2 normal, no murmurs, regular rate and rhythm. Abdomen is soft without tenderness, guarding, mass or organomegaly.  exam: deferred. Extremities show no edema, normal peripheral pulses. Neurological is normal without focal findings. Psych: normal affect. Mood good. Oriented x 3. Judgement and insight intact. Assessment/Plan:       ICD-10-CM ICD-9-CM    1. Routine medical exam Z00.00 V70.0    2. Atherosclerosis of native coronary artery of native heart without angina pectoris I25.10 414.01    3. Essential hypertension I10 401.9    4. Hyperlipidemia, unspecified hyperlipidemia type E78.5 272.4    5. BMI 26.0-26.9,adult Z68.26 V85.22    6. Encounter for immunization Z23 V03.89 [place] IMMUNIZ ADMIN,1 SINGLE/COMB VAC/TOXOID      INFLUENZA VIRUS VAC QUAD,SPLIT,PRESV FREE SYRINGE 3/> YRS IM     Age and sex specific counseling. Cont follow-up with cardiology. Cont current care. BMI>25 - advised on diet and exercise. Flu vaccine administered. All chart history elements were reviewed by me at the time of the visit even though marked at time of note closure. Patient understands our medical plan. Patient has provided input and agrees with goals. Alternatives have been explained and offered. All questions answered. The patient is to call if condition worsens or fails to improve.      Follow-up Disposition:  Return in about 1 year (around 9/7/2018) for physical.

## 2017-10-04 DIAGNOSIS — B35.6 TINEA CRURIS: ICD-10-CM

## 2017-10-04 DIAGNOSIS — B35.1 ONYCHOMYCOSIS: ICD-10-CM

## 2017-10-04 RX ORDER — ATORVASTATIN CALCIUM 20 MG/1
20 TABLET, FILM COATED ORAL DAILY
Qty: 90 TAB | Refills: 3 | Status: SHIPPED | OUTPATIENT
Start: 2017-10-04 | End: 2018-10-01 | Stop reason: SDUPTHER

## 2017-10-04 NOTE — TELEPHONE ENCOUNTER
This pharmacy faxed over request for the following prescriptions to be filled:    Medication requested :   Requested Prescriptions     Pending Prescriptions Disp Refills    atorvastatin (LIPITOR) 20 mg tablet 90 Tab 2     Sig: Take 1 Tab by mouth daily.      PCP: Willow Hunter or Print: [facility]  Mail order or Local pharmacy: [phone]    Scheduled appointment if not seen by current providers in office: LOV: 9/7/17, Next appt: 9/10/18

## 2017-10-25 ENCOUNTER — TELEPHONE (OUTPATIENT)
Dept: FAMILY MEDICINE CLINIC | Age: 67
End: 2017-10-25

## 2017-10-25 NOTE — TELEPHONE ENCOUNTER
Pt states that he dropped off forms that needs to be filled out by Dr Nadeen Ruiz. He states that he needs to pick it up today. Advised that Dr Nadeen Ruiz has not been out of the office and that I would let him know when he comes in today that pt would like to  the form today. Adrián advise. When ready for . [phone]. Thank you.

## 2017-10-25 NOTE — TELEPHONE ENCOUNTER
Spoke with Jaimee Eaton he was informed that as soon forms are sign by physician we will notify him. Ye voice understanding.

## 2018-07-17 ENCOUNTER — OFFICE VISIT (OUTPATIENT)
Dept: CARDIOLOGY CLINIC | Age: 68
End: 2018-07-17

## 2018-07-17 VITALS
SYSTOLIC BLOOD PRESSURE: 142 MMHG | HEART RATE: 67 BPM | DIASTOLIC BLOOD PRESSURE: 101 MMHG | HEIGHT: 64 IN | WEIGHT: 154 LBS | BODY MASS INDEX: 26.29 KG/M2

## 2018-07-17 DIAGNOSIS — I25.10 ATHEROSCLEROSIS OF NATIVE CORONARY ARTERY OF NATIVE HEART WITHOUT ANGINA PECTORIS: Primary | ICD-10-CM

## 2018-07-17 DIAGNOSIS — E78.5 HYPERLIPIDEMIA, UNSPECIFIED HYPERLIPIDEMIA TYPE: ICD-10-CM

## 2018-07-17 DIAGNOSIS — Z98.61 POSTSURGICAL PERCUTANEOUS TRANSLUMINAL CORONARY ANGIOPLASTY STATUS: ICD-10-CM

## 2018-07-17 DIAGNOSIS — I10 ESSENTIAL HYPERTENSION: ICD-10-CM

## 2018-07-17 NOTE — MR AVS SNAPSHOT
[address] [facility] [address] 
 
 
 [address] [facility] [address] 
[phone] Patient: Leona Thomas MRN: [id number] VICENTA:[id number] Visit Information Date & Time Provider Department Dept. Phone Encounter #  
 7/17/2018  8:30 AM Ramona Canales MD [facility] [phone] [id number] Follow-up Instructions Return in about 1 year (around 7/17/2019). Your Appointments 7/17/2018  8:30 AM  
Follow Up with Ramona Canales MD  
[facility] ([address]) Appt Note: 1 year follow up/labs [address] [facility] [address]  
  
   
 [address] [address] [address] [address]  
  
    
 9/10/2018  8:00 AM  
Complete Physical with Irene Lindsey MD  
[facility] ([address]) Appt Note: [address] [facility] [address] [facility] [address]  
[address] [facility] [address] [facility] [address] Upcoming Health Maintenance Date Due  
 GLAUCOMA SCREENING Q2Y 12/10/2017 Pneumococcal 65+ Low/Medium Risk (1 of 2 - PCV13) 12/10/2017 Influenza Age 5 to Adult 8/1/2018 COLONOSCOPY 8/21/2020 DTaP/Tdap/Td series (2 - Td) 1/7/2025 Allergies as of 7/17/2018  Review Complete On: 7/17/2018 By: Ramona Canales MD  
 No Known Allergies Current Immunizations  Reviewed on 8/24/2016 Name Date Influenza Vaccine (Quad) PF 9/7/2017 Tdap 1/7/2015 Zoster Vaccine, Live 1/22/2015 Not reviewed this visit You Were Diagnosed With   
  
 Codes Comments Atherosclerosis of native coronary artery of native heart without angina pectoris    -  Primary ICD-10-CM: I25.10 ICD-9-CM: 414.01 Stable no angina continue medical management. Essential hypertension     ICD-10-CM: I10 
ICD-9-CM: 401.9 Elevated in office. Usually normal at home. Will monitor with blood pressure check Postsurgical percutaneous transluminal coronary angioplasty status     ICD-10-CM: Z98.61 ICD-9-CM: V45.82 Stable Hyperlipidemia, unspecified hyperlipidemia type     ICD-10-CM: E78.5 ICD-9-CM: 272.4 Controlled. Triglycerides reportedly normal on last lab Vitals BP Pulse Height(growth percentile) Weight(growth percentile) BMI Smoking Status (!) 142/101 67 5' 4\" (1.626 m) 154 lb (69.9 kg) 26.43 kg/m2 Never Smoker BMI and BSA Data Body Mass Index Body Surface Area  
 26.43 kg/m 2 1.78 m 2 Preferred Pharmacy Pharmacy Name Phone [id number] [place], [address] [facility] [facility] [phone] Your Updated Medication List  
  
   
This list is accurate as of 7/17/18  7:49 AM.  Always use your most recent med list.  
  
  
  
  
 aspirin delayed-release 81 mg tablet Take 81 mg by mouth daily. atorvastatin 20 mg tablet Commonly known as:  LIPITOR Take 1 Tab by mouth daily. calcium citrate 200 mg (950 mg) tablet Take 200 mg by mouth daily. cyanocobalamin 1,000 mcg sublingual tablet Commonly known as:  VITAMIN B-12 Take 1 Tab by mouth daily. glucosamine sulfate 500 mg tablet Take 500 mg by mouth daily. metoprolol succinate 50 mg XL tablet Commonly known as:  TOPROL-XL Take 1 Tab by mouth daily. multivitamin, tx-iron-ca-min 9 mg iron-400 mcg Tab tablet Commonly known as:  THERA-M w/ IRON Take 1 Tab by mouth daily. Follow-up Instructions Return in about 1 year (around 7/17/2019). Introducing [facility]! Dear Sherrell Kuhn: Thank you for requesting a [employer] account. Our records indicate that you have previously registered for a [employer] account but its currently inactive. Please call our [employer] support line at [phone]. Additional Information If you have questions, please visit the Frequently Asked Questions section of the [employer] website at [email or web address]. [employer] Kuaidi [email or web address] Remember, [employer] is NOT to be used for urgent needs. For medical emergencies, dial 911. Now available from your iPhone and Android! Please provide this summary of care documentation to your next provider. Your primary care clinician is listed as Edmond Barrera. If you have any questions after today's visit, please call [phone].

## 2018-07-17 NOTE — PROGRESS NOTES
HISTORY OF PRESENT ILLNESS  Gayatri Razo is a 74 y.o. male. HPI Comments: Patient with MATY Wolff 53. On follow up patient denies any chest pains,sob, palpitation or other significant symptoms. Hypertension   The history is provided by the patient. This is a new problem. The problem occurs constantly. The problem has not changed since onset. Associated symptoms include headaches. Pertinent negatives include no chest pain, no abdominal pain and no shortness of breath. Nothing aggravates the symptoms. Review of Systems   Constitutional: Negative for chills and fever. HENT: Negative for nosebleeds. Eyes: Negative for blurred vision and double vision. Respiratory: Negative for cough, hemoptysis, sputum production, shortness of breath and wheezing. Cardiovascular: Negative for chest pain, palpitations, orthopnea, claudication, leg swelling and PND. Gastrointestinal: Negative for abdominal pain, heartburn, nausea and vomiting. Musculoskeletal: Negative for myalgias. Skin: Negative for rash. Neurological: Positive for headaches. Negative for dizziness and weakness. Endo/Heme/Allergies: Does not bruise/bleed easily. No family history on file.     Past Medical History:   Diagnosis Date    Coronary atherosclerosis of native coronary artery     No angina, H/O stent     H/O colonoscopy at 53yo    patient report - unable to get records    H/O colonoscopy with polypectomy 08/2017    polyp - follow-up 3 years    HTN (hypertension)     Other and unspecified hyperlipidemia     On Lipitor    Postsurgical percutaneous transluminal coronary angioplasty status        Past Surgical History:   Procedure Laterality Date    HX CORONARY STENT PLACEMENT  2003    LAD/JENY       Social History   Substance Use Topics    Smoking status: Never Smoker    Smokeless tobacco: Never Used    Alcohol use 0.0 oz/week     0 Standard drinks or equivalent per week      Comment: occasional No Known Allergies    Current Outpatient Prescriptions   Medication Sig    atorvastatin (LIPITOR) 20 mg tablet Take 1 Tab by mouth daily.  calcium citrate 200 mg (950 mg) tablet Take 200 mg by mouth daily.  glucosamine sulfate 500 mg tablet Take 500 mg by mouth daily.  multivitamin, tx-iron-ca-min (THERA-M W/ IRON) 9 mg iron-400 mcg tab tablet Take 1 Tab by mouth daily.  metoprolol succinate (TOPROL-XL) 50 mg XL tablet Take 1 Tab by mouth daily.  cyanocobalamin (VITAMIN B-12) 1,000 mcg sublingual tablet Take 1 Tab by mouth daily.  aspirin delayed-release 81 mg tablet Take 81 mg by mouth daily. No current facility-administered medications for this visit. Visit Vitals    BP (!) 142/101    Pulse 67    Ht 5' 4\" (1.626 m)    Wt 69.9 kg (154 lb)    BMI 26.43 kg/m2         Physical Exam   Constitutional: He is oriented to person, place, and time. He appears well-developed and well-nourished. HENT:   Head: Normocephalic and atraumatic. Eyes: Conjunctivae are normal.   Neck: Neck supple. No JVD present. No tracheal deviation present. No thyromegaly present. Cardiovascular: Normal rate, regular rhythm and normal heart sounds. Exam reveals no gallop and no friction rub. No murmur heard. Pulmonary/Chest: Breath sounds normal. No respiratory distress. He has no wheezes. He has no rales. He exhibits no tenderness. Abdominal: Soft. There is no tenderness. Musculoskeletal: He exhibits no edema. Neurological: He is alert and oriented to person, place, and time. Skin: Skin is warm and dry. Psychiatric: He has a normal mood and affect. Mr. Rowan Silverman has a reminder for a \"due or due soon\" health maintenance. I have asked that he contact his primary care provider for follow-up on this health maintenance. NUCLEAR [email or web address]     Findings:   1.  Stress images reveal normal Myoview distrubution in all the LV segments in short axis, vertical and horizontal long axis views.   2. Resting images have a normal uptake. 3. Gated images reveal normal wall motion and the ejection fraction is calculated to be 86%. Conclusion:   1. Normal perfusion scan. 2. Normal wall motion and ejection fraction. 3. Low risk scan. Assessment       ICD-10-CM ICD-9-CM    1. Atherosclerosis of native coronary artery of native heart without angina pectoris I25.10 414.01     Stable no angina continue medical management. 2. Essential hypertension I10 401.9     Elevated in office. Usually normal at home. Will monitor with blood pressure check   3. Postsurgical percutaneous transluminal coronary angioplasty status Z98.61 V45.82     Stable   4. Hyperlipidemia, unspecified hyperlipidemia type E78.5 272.4     Controlled. Triglycerides reportedly normal on last lab       There are no discontinued medications. No orders of the defined types were placed in this encounter. Follow-up Disposition:  Return in about 1 year (around 7/17/2019).

## 2018-07-17 NOTE — PROGRESS NOTES
1. Have you been to the ER, urgent care clinic since your last visit? Hospitalized since your last visit?     no  2. Have you seen or consulted any other health care providers outside of the [address] [place] [address] since your last visit? Include any pap smears or colon screening. No     3. Since your last visit, have you had any of the following symptoms?  no

## 2018-07-17 NOTE — LETTER
Wang Sine 1950 7/17/2018 Dear Allen Irish, MD Wende Horns, MD Vivica Klinefelter, MD Enola Ripper, MD 
 
I had the pleasure of evaluating  Mr. Janay Lewis in office today. Below are the relevant portions of my assessment and plan of care. ICD-10-CM ICD-9-CM 1. Atherosclerosis of native coronary artery of native heart without angina pectoris I25.10 414.01 Stable no angina continue medical management. 2. Essential hypertension I10 401.9 Elevated in office. Usually normal at home. Will monitor with blood pressure check 3. Postsurgical percutaneous transluminal coronary angioplasty status Z98.61 V45.82 Stable 4. Hyperlipidemia, unspecified hyperlipidemia type E78.5 272.4 Controlled. Triglycerides reportedly normal on last lab Current Outpatient Prescriptions Medication Sig Dispense Refill  atorvastatin (LIPITOR) 20 mg tablet Take 1 Tab by mouth daily. 90 Tab 3  
 calcium citrate 200 mg (950 mg) tablet Take 200 mg by mouth daily.  glucosamine sulfate 500 mg tablet Take 500 mg by mouth daily.  multivitamin, tx-iron-ca-min (THERA-M W/ IRON) 9 mg iron-400 mcg tab tablet Take 1 Tab by mouth daily.  metoprolol succinate (TOPROL-XL) 50 mg XL tablet Take 1 Tab by mouth daily. 90 Tab 3  cyanocobalamin (VITAMIN B-12) 1,000 mcg sublingual tablet Take 1 Tab by mouth daily. 30 Tab 11  
 aspirin delayed-release 81 mg tablet Take 81 mg by mouth daily. No orders of the defined types were placed in this encounter. If you have questions, please do not hesitate to call me. I look forward to following Mr. Janay Lewis along with you. Sincerely, José Gandhi MD

## 2018-08-06 RX ORDER — METOPROLOL SUCCINATE 50 MG/1
TABLET, EXTENDED RELEASE ORAL
Qty: 90 TAB | Refills: 3 | Status: SHIPPED | OUTPATIENT
Start: 2018-08-06 | End: 2019-08-26 | Stop reason: SDUPTHER

## 2018-08-30 ENCOUNTER — TELEPHONE (OUTPATIENT)
Dept: FAMILY MEDICINE CLINIC | Age: 68
End: 2018-08-30

## 2018-08-30 DIAGNOSIS — E78.5 HYPERLIPIDEMIA, UNSPECIFIED HYPERLIPIDEMIA TYPE: ICD-10-CM

## 2018-08-30 DIAGNOSIS — I25.10 ATHEROSCLEROSIS OF NATIVE CORONARY ARTERY OF NATIVE HEART WITHOUT ANGINA PECTORIS: Primary | ICD-10-CM

## 2018-08-30 DIAGNOSIS — I10 ESSENTIAL HYPERTENSION: ICD-10-CM

## 2018-08-30 DIAGNOSIS — Z12.5 PROSTATE CANCER SCREENING: ICD-10-CM

## 2018-08-30 NOTE — TELEPHONE ENCOUNTER
Pt came by and would like an order for his labs to be placed before his appt. Please call pt at your earliest convenience.

## 2018-09-06 ENCOUNTER — HOSPITAL ENCOUNTER (OUTPATIENT)
Dept: LAB | Age: 68
Discharge: HOME | End: 2018-09-06
Payer: COMMERCIAL

## 2018-09-06 DIAGNOSIS — I25.10 ATHEROSCLEROSIS OF NATIVE CORONARY ARTERY OF NATIVE HEART WITHOUT ANGINA PECTORIS: ICD-10-CM

## 2018-09-06 DIAGNOSIS — E78.5 HYPERLIPIDEMIA, UNSPECIFIED HYPERLIPIDEMIA TYPE: ICD-10-CM

## 2018-09-06 DIAGNOSIS — Z12.5 PROSTATE CANCER SCREENING: ICD-10-CM

## 2018-09-06 DIAGNOSIS — I10 ESSENTIAL HYPERTENSION: ICD-10-CM

## 2018-09-06 LAB
ALBUMIN SERPL-MCNC: 3.6 G/DL (ref 3.4–5)
ALBUMIN/GLOB SERPL: 0.9 {RATIO} (ref 0.8–1.7)
ALP SERPL-CCNC: 88 U/L (ref 45–117)
ALT SERPL-CCNC: 12 U/L (ref 16–61)
ANION GAP SERPL CALC-SCNC: 6 MMOL/L (ref 3–18)
AST SERPL-CCNC: 18 U/L (ref 15–37)
BASOPHILS # BLD: 0 K/UL (ref 0–0.1)
BASOPHILS NFR BLD: 1 % (ref 0–2)
BILIRUB SERPL-MCNC: 0.5 MG/DL (ref 0.2–1)
BUN SERPL-MCNC: 22 MG/DL (ref 7–18)
BUN/CREAT SERPL: 20 (ref 12–20)
CALCIUM SERPL-MCNC: 8.6 MG/DL (ref 8.5–10.1)
CHLORIDE SERPL-SCNC: 104 MMOL/L (ref 100–108)
CHOLEST SERPL-MCNC: 125 MG/DL
CO2 SERPL-SCNC: 29 MMOL/L (ref 21–32)
CREAT SERPL-MCNC: 1.12 MG/DL (ref 0.6–1.3)
DIFFERENTIAL METHOD BLD: NORMAL
EOSINOPHIL # BLD: 0.3 K/UL (ref 0–0.4)
EOSINOPHIL NFR BLD: 4 % (ref 0–5)
ERYTHROCYTE [DISTWIDTH] IN BLOOD BY AUTOMATED COUNT: 13.2 % (ref 11.6–14.5)
GLOBULIN SER CALC-MCNC: 3.9 G/DL (ref 2–4)
GLUCOSE SERPL-MCNC: 88 MG/DL (ref 74–99)
HCT VFR BLD AUTO: 42.3 % (ref 36–48)
HDLC SERPL-MCNC: 38 MG/DL (ref 40–60)
HDLC SERPL: 3.3 {RATIO} (ref 0–5)
HGB BLD-MCNC: 14.1 G/DL (ref 13–16)
LDLC SERPL CALC-MCNC: 54.8 MG/DL (ref 0–100)
LIPID PROFILE,FLP: ABNORMAL
LYMPHOCYTES # BLD: 2 K/UL (ref 0.9–3.6)
LYMPHOCYTES NFR BLD: 29 % (ref 21–52)
MCH RBC QN AUTO: 27.8 PG (ref 24–34)
MCHC RBC AUTO-ENTMCNC: 33.3 G/DL (ref 31–37)
MCV RBC AUTO: 83.4 FL (ref 74–97)
MONOCYTES # BLD: 0.5 K/UL (ref 0.05–1.2)
MONOCYTES NFR BLD: 8 % (ref 3–10)
NEUTS SEG # BLD: 4 K/UL (ref 1.8–8)
NEUTS SEG NFR BLD: 58 % (ref 40–73)
PLATELET # BLD AUTO: 199 K/UL (ref 135–420)
PMV BLD AUTO: 10.3 FL (ref 9.2–11.8)
POTASSIUM SERPL-SCNC: 5 MMOL/L (ref 3.5–5.5)
PROT SERPL-MCNC: 7.5 G/DL (ref 6.4–8.2)
PSA SERPL-MCNC: 1.1 NG/ML (ref 0–4)
RBC # BLD AUTO: 5.07 M/UL (ref 4.7–5.5)
SODIUM SERPL-SCNC: 139 MMOL/L (ref 136–145)
TRIGL SERPL-MCNC: 161 MG/DL (ref ?–150)
VLDLC SERPL CALC-MCNC: 32.2 MG/DL
WBC # BLD AUTO: 6.8 K/UL (ref 4.6–13.2)

## 2018-09-06 PROCEDURE — 80061 LIPID PANEL: CPT | Performed by: FAMILY MEDICINE

## 2018-09-06 PROCEDURE — 80053 COMPREHEN METABOLIC PANEL: CPT | Performed by: FAMILY MEDICINE

## 2018-09-06 PROCEDURE — 84153 ASSAY OF PSA TOTAL: CPT | Performed by: FAMILY MEDICINE

## 2018-09-06 PROCEDURE — 85025 COMPLETE CBC W/AUTO DIFF WBC: CPT | Performed by: FAMILY MEDICINE

## 2018-09-06 PROCEDURE — 36415 COLL VENOUS BLD VENIPUNCTURE: CPT | Performed by: FAMILY MEDICINE

## 2018-09-10 ENCOUNTER — OFFICE VISIT (OUTPATIENT)
Dept: FAMILY MEDICINE CLINIC | Age: 68
End: 2018-09-10

## 2018-09-10 VITALS
OXYGEN SATURATION: 99 % | DIASTOLIC BLOOD PRESSURE: 88 MMHG | BODY MASS INDEX: 26.29 KG/M2 | SYSTOLIC BLOOD PRESSURE: 136 MMHG | WEIGHT: 154 LBS | RESPIRATION RATE: 14 BRPM | HEART RATE: 64 BPM | TEMPERATURE: 97.8 F | HEIGHT: 64 IN

## 2018-09-10 DIAGNOSIS — Z00.00 ROUTINE MEDICAL EXAM: Primary | ICD-10-CM

## 2018-09-10 DIAGNOSIS — I10 ESSENTIAL HYPERTENSION: ICD-10-CM

## 2018-09-10 DIAGNOSIS — M18.11 ARTHRITIS OF CARPOMETACARPAL (CMC) JOINT OF RIGHT THUMB: ICD-10-CM

## 2018-09-10 DIAGNOSIS — I25.10 ATHEROSCLEROSIS OF NATIVE CORONARY ARTERY OF NATIVE HEART WITHOUT ANGINA PECTORIS: ICD-10-CM

## 2018-09-10 DIAGNOSIS — E78.5 HYPERLIPIDEMIA, UNSPECIFIED HYPERLIPIDEMIA TYPE: ICD-10-CM

## 2018-09-10 DIAGNOSIS — Z23 ENCOUNTER FOR IMMUNIZATION: ICD-10-CM

## 2018-09-10 NOTE — PROGRESS NOTES
Chief Complaint Patient presents with  Hypertension  Cholesterol Problem  Results  
  lab results review Subjective:  
Francisco Khoury is a 74 y.o. male presenting for his annual checkup. ROS:  Feeling well. No dyspnea or chest pain on exertion. No abdominal pain, change in bowel habits, black or bloody stools. No urinary tract or prostatic symptoms. No neurological complaints. Specific concerns today:  
Has some pain localized to the right CMC of the thumb. Has had stiffness at times. He has had no numbness or tingling. He has had some relief with a brace. Current Outpatient Prescriptions Medication Sig Dispense Refill  metoprolol succinate (TOPROL-XL) 50 mg XL tablet TAKE 1 TAB BY MOUTH DAILY. 90 Tab 3  
 atorvastatin (LIPITOR) 20 mg tablet Take 1 Tab by mouth daily. 90 Tab 3  
 calcium citrate 200 mg (950 mg) tablet Take 200 mg by mouth daily.  glucosamine sulfate 500 mg tablet Take 500 mg by mouth daily.  multivitamin, tx-iron-ca-min (THERA-M W/ IRON) 9 mg iron-400 mcg tab tablet Take 1 Tab by mouth daily.  cyanocobalamin (VITAMIN B-12) 1,000 mcg sublingual tablet Take 1 Tab by mouth daily. 30 Tab 11  
 aspirin delayed-release 81 mg tablet Take 81 mg by mouth daily. No Known Allergies Past Medical History:  
Diagnosis Date  Coronary atherosclerosis of native coronary artery No angina, H/O stent  H/O colonoscopy at 53yo  
 patient report - unable to get records  H/O colonoscopy with polypectomy 08/2017 polyp - follow-up 3 years  HTN (hypertension)  Other and unspecified hyperlipidemia On Lipitor  Postsurgical percutaneous transluminal coronary angioplasty status Past Surgical History:  
Procedure Laterality Date  HX CORONARY STENT PLACEMENT  2003 LAD/JENY History reviewed. No pertinent family history. Social History Substance Use Topics  Smoking status: Never Smoker  Smokeless tobacco: Never Used  Alcohol use 0.0 oz/week  
  0 Standard drinks or equivalent per week Comment: occasional  
  
Objective:  
 
Blood pressure 136/88, pulse 64, temperature 97.8 °F (36.6 °C), temperature source Oral, resp. rate 14, height 5' 4\" (1.626 m), weight 154 lb (69.9 kg), SpO2 99 %. The patient appears well, alert, oriented x 3, in no distress. ENT normal.  Neck supple. No adenopathy or thyromegaly. MARQUISE. Lungs are clear, good air entry, no wheezes, rhonchi or rales. S1 and S2 normal, no murmurs, regular rate and rhythm. Abdomen is soft without tenderness, guarding, mass or organomegaly. Extremities show no edema, normal peripheral pulses. Neurological is normal without focal findings. Right thumb: tender and crepitus at the [facility]. There is no swelling, warmth, or redness. Psych: normal affect. Mood good. Oriented x 3. Judgement and insight intact. Assessment/Plan: ICD-10-CM ICD-9-CM 1. Routine medical exam Z00.00 V70.0 2. Essential hypertension I10 401.9 3. Atherosclerosis of native coronary artery of native heart without angina pectoris I25.10 414.01   
4. Hyperlipidemia, unspecified hyperlipidemia type E78.5 272.4 5. Arthritis of carpometacarpal (CMC) joint of right thumb M18.11 716.94   
6. Encounter for immunization Z23 V03.89 [place] IMMUNIZ ADMIN,1 SINGLE/COMB VAC/TOXOID INFLUENZA VACCINE INACTIVATED (IIV), SUBUNIT, ADJUVANTED, IM  
7. Body mass index (BMI) of 25.0 to 29.9 E66.3 278.02 Age and sex specific counseling. Det and exercise for BMI>25. Elevated BP but cardiology aware and home BPs are normal.   
Cont lipitor. Cont regular cardiology follow-up. Reviewed labs. Flu vaccine administered. PCV13 to be administered in 1 month at [place]. Can call for topical Pennsaid if CMC pain persists. All chart history elements were reviewed by me at the time of the visit even though marked at time of note closure.  Patient understands our medical plan. Patient has provided input and agrees with goals. Alternatives have been explained and offered. All questions answered. The patient is to call if condition worsens or fails to improve. Follow-up Disposition: 
Return in about 1 year (around 9/10/2019) for physical.  Nurse visit in October for Prevnar (pneumonia vaccine).

## 2018-09-10 NOTE — PATIENT INSTRUCTIONS

## 2018-09-10 NOTE — MR AVS SNAPSHOT
[facility] [address] [facility] 
[phone] Patient: Saniya Hudson MRN: [id number] [facility]:98/79/2762 Visit Information Date & Time Provider Department Dept. Phone Encounter #  
 9/10/2018  8:00 AM Mustapha Montemayor, [address] [id number] Follow-up Instructions Return in about 1 year (around 9/10/2019) for physical.  Nurse visit in October for Prevnar (pneumonia vaccine). Upcoming Health Maintenance Date Due  
 GLAUCOMA SCREENING Q2Y 12/10/2017 Pneumococcal 65+ Low/Medium Risk (1 of 2 - PCV13) 12/10/2017 MEDICARE YEARLY EXAM 9/6/2018 COLONOSCOPY 8/21/2020 DTaP/Tdap/Td series (2 - Td) 1/7/2025 Allergies as of 9/10/2018  Review Complete On: 9/10/2018 By: Mustapha Montemayor MD  
 No Known Allergies Current Immunizations  Reviewed on 9/10/2018 Name Date Influenza Vaccine (Quad) PF 9/7/2017 Influenza Vaccine (Tri) Adjuvanted 9/10/2018 Tdap 1/7/2015 Zoster Vaccine, Live 1/22/2015 Reviewed by Mustapha Montemayor MD on 9/10/2018 at  8:21 AM  
 Reviewed by Mustapha Montemayor MD on 9/10/2018 at  8:21 AM  
 Reviewed by Mustapha Montemayor MD on 9/10/2018 at  8:21 AM  
You Were Diagnosed With   
  
 Codes Comments Routine medical exam    -  Primary ICD-10-CM: Z00.00 ICD-9-CM: V70.0 Essential hypertension     ICD-10-CM: I10 
ICD-9-CM: 401.9 Atherosclerosis of native coronary artery of native heart without angina pectoris     ICD-10-CM: I25.10 ICD-9-CM: 414.01 Hyperlipidemia, unspecified hyperlipidemia type     ICD-10-CM: E78.5 ICD-9-CM: 272.4 Arthritis of carpometacarpal (CMC) joint of right thumb     ICD-10-CM: M18.11 ICD-9-CM: 716.94 Encounter for immunization     ICD-10-CM: X82 ICD-9-CM: V03.89 Vitals BP Pulse Temp Resp Height(growth percentile) Weight(growth percentile) 136/88 64 97.8 °F (36.6 °C) (Oral) 14 5' 4\" (1.626 m) 154 lb (69.9 kg) SpO2 BMI Smoking Status 99% 26.43 kg/m2 Never Smoker Vitals History BMI and BSA Data Body Mass Index Body Surface Area  
 26.43 kg/m 2 1.78 m 2 Preferred Pharmacy Pharmacy Name Phone [facility][id number] Blair Higgins, [address] [facility] [facility] [phone] Your Updated Medication List  
  
   
This list is accurate as of 9/10/18  8:30 AM.  Always use your most recent med list.  
  
  
  
  
 aspirin delayed-release 81 mg tablet Take 81 mg by mouth daily. atorvastatin 20 mg tablet Commonly known as:  LIPITOR Take 1 Tab by mouth daily. calcium citrate 200 mg (950 mg) tablet Take 200 mg by mouth daily. cyanocobalamin 1,000 mcg sublingual tablet Commonly known as:  VITAMIN B-12 Take 1 Tab by mouth daily. glucosamine sulfate 500 mg tablet Take 500 mg by mouth daily. metoprolol succinate 50 mg XL tablet Commonly known as:  TOPROL-XL  
TAKE 1 TAB BY MOUTH DAILY. multivitamin, tx-iron-ca-min 9 mg iron-400 mcg Tab tablet Commonly known as:  THERA-M w/ IRON Take 1 Tab by mouth daily. We Performed the Following INFLUENZA VACCINE INACTIVATED (IIV), SUBUNIT, ADJUVANTED, IM [id number] CPT(R)] AR IMMUNIZ ADMIN,1 SINGLE/COMB VAC/TOXOID [id number] CPT(R)] Follow-up Instructions Return in about 1 year (around 9/10/2019) for physical.  Nurse visit in October for Prevnar (pneumonia vaccine). Patient Instructions Well Visit, Over 72: Care Instructions Your Care Instructions Physical exams can help you stay healthy. Your doctor has checked your overall health and may have suggested ways to take good care of yourself. He or she also may have recommended tests. At home, you can help prevent illness with healthy eating, regular exercise, and other steps. Follow-up care is a key part of your treatment and safety. Be sure to make and go to all appointments, and call your doctor if you are having problems. It's also a good idea to know your test results and keep a list of the medicines you take. How can you care for yourself at home? · Reach and stay at a healthy weight. This will lower your risk for many problems, such as obesity, diabetes, heart disease, and high blood pressure. · Get at least 30 minutes of exercise on most days of the week. Walking is a good choice. You also may want to do other activities, such as running, swimming, cycling, or playing tennis or team sports. · Do not smoke. Smoking can make health problems worse. If you need help quitting, talk to your doctor about stop-smoking programs and medicines. These can increase your chances of quitting for good. · Protect your skin from too much sun. When you're outdoors from 10 a.m. to 4 p.m., stay in the shade or cover up with clothing and a hat with a wide brim. Wear sunglasses that block UV rays. Even when it's cloudy, put broad-spectrum sunscreen (SPF 30 or higher) on any exposed skin. · See a dentist one or two times a year for checkups and to have your teeth cleaned. · Wear a seat belt in the car. · Limit alcohol to 2 drinks a day for men and 1 drink a day for women. Too much alcohol can cause health problems. Follow your doctor's advice about when to have certain tests. These tests can spot problems early. For men and women · Cholesterol. Your doctor will tell you how often to have this done based on your overall health and other things that can increase your risk for heart attack and stroke. · Blood pressure. Have your blood pressure checked during a routine doctor visit. Your doctor will tell you how often to check your blood pressure based on your age, your blood pressure results, and other factors. · Diabetes. Ask your doctor whether you should have tests for diabetes. · Vision. Experts recommend that you have yearly exams for glaucoma and other age-related eye problems. · Hearing. Tell your doctor if you notice any change in your hearing. You can have tests to find out how well you hear. · Colon cancer tests. Keep having colon cancer tests as your doctor recommends. You can have one of several types of tests. · Heart attack and stroke risk. At least every 4 to 6 years, you should have your risk for heart attack and stroke assessed. Your doctor uses factors such as your age, blood pressure, cholesterol, and whether you smoke or have diabetes to show what your risk for a heart attack or stroke is over the next 10 years. · Osteoporosis. Talk to your doctor about whether you should have a bone density test to find out whether you have thinning bones. Also ask your doctor about whether you should take calcium and vitamin D supplements. For women · Pap test and pelvic exam. You may no longer need a Pap test. Talk with your doctor about whether to stop or continue to have Pap tests. · Breast exam and mammogram. Ask how often you should have a mammogram, which is an X-ray of your breasts. A mammogram can spot breast cancer before it can be felt and when it is easiest to treat. · Thyroid disease. Talk to your doctor about whether to have your thyroid checked as part of a regular physical exam. Women have an increased chance of a thyroid problem. For men · Prostate exam. Talk to your doctor about whether you should have a blood test (called a PSA test) for prostate cancer. Experts disagree on whether men should have this test. Some experts recommend that you discuss the benefits and risks of the test with your doctor. · Abdominal aortic aneurysm. Ask your doctor whether you should have a test to check for an aneurysm. You may need a test if you ever smoked or if your parent, brother, sister, or child has had an aneurysm. When should you call for help? Watch closely for changes in your health, and be sure to contact your doctor if you have any problems or symptoms that concern you. Where can you learn more? Go to http://kevin-isabel.info/. Enter Q768 in the search box to learn more about \"Well Visit, Over 65: Care Instructions. \" Current as of: May 16, 2017 Content Version: 11.7 © 7504-3938 [employer]. Care instructions adapted under license by [employer] (which disclaims liability or warranty for this information). If you have questions about a medical condition or this instruction, always ask your healthcare professional. [address] any warranty or liability for your use of this information. Introducing [facility]! Francine Raymond introduces Pager patient portal. Now you can access parts of your medical record, email your doctor's office, and request medication refills online. 1. In your internet browser, go to [email or web address] 2. Click on the First Time User? Click Here link in the Sign In box. You will see the New Member Sign Up page. 3. Enter your Pager Access Code exactly as it appears below. You will not need to use this code after youve completed the sign-up process. If you do not sign up before the expiration date, you must request a new code. · Pager Access Code: [email or web address] Expires: 12/5/2018  8:29 AM 
 
4. Enter the last four digits of your Social Security Number (xxxx) and Date of Birth (mm/dd/yyyy) as indicated and click Submit. You will be taken to the next sign-up page. 5. Create a Pager ID. This will be your Pager login ID and cannot be changed, so think of one that is secure and easy to remember. 6. Create a Pager password. You can change your password at any time. 7. Enter your Password Reset Question and Answer. This can be used at a later time if you forget your password. 8. Enter your e-mail address. You will receive e-mail notification when new information is available in [address] 9. Click Sign Up. You can now view and download portions of your medical record. 10. Click the Download Summary menu link to download a portable copy of your medical information. If you have questions, please visit the Frequently Asked Questions section of the [employer] website. Remember, [employer] is NOT to be used for urgent needs. For medical emergencies, dial 911. Now available from your iPhone and Android! Please provide this summary of care documentation to your next provider. Your primary care clinician is listed as Kym Monae. If you have any questions after today's visit, please call [phone].

## 2018-09-10 NOTE — PROGRESS NOTES
Chief Complaint Patient presents with  Hypertension  Cholesterol Problem  Results  
  lab results review Cardiology f/u due 7/2019 Eye exam: Dr. Hannah Bai x 1 month ago (requested today) 1. Have you been to the ER, urgent care clinic since your last visit? Hospitalized since your last visit? No 
 
2. Have you seen or consulted any other health care providers outside of the [facility] since your last visit? Include any pap smears or colon screening. No 
 
Physician order obtained. Patient completed adult immunization consent form. Allergies, contraindications and recommendations reviewed with patient. High dose seasonal influenza vaccine administered IM right deltoid. Patient tolerated well. Patient remained in office for 15 minutes after injection and no adverse reactions were noted.

## 2018-10-01 DIAGNOSIS — B35.1 ONYCHOMYCOSIS: ICD-10-CM

## 2018-10-01 DIAGNOSIS — B35.6 TINEA CRURIS: ICD-10-CM

## 2018-10-01 NOTE — TELEPHONE ENCOUNTER
This pharmacy faxed over request for the following prescriptions to be filled:    Medication requested :   Requested Prescriptions     Pending Prescriptions Disp Refills    atorvastatin (LIPITOR) 20 mg tablet 90 Tab 3     Sig: Take 1 Tab by mouth daily.         PCP: Dr. Ranjit Thomas or Print: [facility]    Mail order or Local pharmacy [id number]    Scheduled appointment if not seen by current providers in office: LOV : 09- NOV: 10-

## 2018-10-02 ENCOUNTER — CLINICAL SUPPORT (OUTPATIENT)
Dept: FAMILY MEDICINE CLINIC | Age: 68
End: 2018-10-02

## 2018-10-02 VITALS — TEMPERATURE: 98.5 F

## 2018-10-02 DIAGNOSIS — Z23 ENCOUNTER FOR IMMUNIZATION: Primary | ICD-10-CM

## 2018-10-02 RX ORDER — ATORVASTATIN CALCIUM 20 MG/1
20 TABLET, FILM COATED ORAL DAILY
Qty: 90 TAB | Refills: 3 | Status: SHIPPED | OUTPATIENT
Start: 2018-10-02 | End: 2019-09-23 | Stop reason: SDUPTHER

## 2018-10-02 NOTE — PROGRESS NOTES
Chief Complaint   Patient presents with    Immunization/Injection     Prevnar vaccine      Physician order obtained. Patient completed adult immunization consent form. Allergies, contraindications and recommendations reviewed with patient. Prevnar 13 vaccine administered IM right deltoid. Patient tolerated well. Patient remained in office for 15 minutes after injection and no adverse reactions were noted.

## 2018-10-02 NOTE — PATIENT INSTRUCTIONS
Pneumococcal Conjugate Vaccine (PCV13): What You Need to Know  Why get vaccinated? Vaccination can protect both children and adults from pneumococcal disease. Pneumococcal disease is caused by bacteria that can spread from person to person through close contact. It can cause ear infections, and it can also lead to more serious infections of the:  · Lungs (pneumonia). · Blood (bacteremia). · Covering of the brain and spinal cord (meningitis). Pneumococcal pneumonia is most common among adults. Pneumococcal meningitis can cause deafness and brain damage, and it kills about 1 child in 10 who get it. Anyone can get pneumococcal disease, but children under 3years of age and adults 72 years and older, people with certain medical conditions, and cigarette smokers are at the highest risk. Before there was a vaccine, the [facility] saw the following in children under 5 each year from pneumococcal disease:  · More than 700 cases of meningitis  · About 13,000 blood infections  · About 5 million ear infections  · About 200 deaths  Since the vaccine became available, severe pneumococcal disease in these children has fallen by 88%. About 18,000 older adults die of pneumococcal disease each year in the [place]. Treatment of pneumococcal infections with penicillin and other drugs is not as effective as it used to be, because some strains of the disease have become resistant to these drugs. This makes prevention of the disease through vaccination even more important. PCV13 vaccine  Pneumococcal conjugate vaccine (called PCV13) protects against 13 types of pneumococcal bacteria. PCV13 is routinely given to children at 2, 4, 6, and 1515 months of age. It is also recommended for children and adults 3to 59years of age with certain health conditions, and for all adults 72years of age and older. Your doctor can give you details.   Some people should not get this vaccine  Anyone who has ever had a life-threatening allergic reaction to a dose of this vaccine, to an earlier pneumococcal vaccine called PCV7, or to any vaccine containing diphtheria toxoid (for example, DTaP), should not get PCV13. Anyone with a severe allergy to any component of PCV13 should not get the vaccine. Tell your doctor if the person being vaccinated has any severe allergies. If the person scheduled for vaccination is not feeling well, your healthcare provider might decide to reschedule the shot on another day. Risks of a vaccine reaction  With any medicine, including vaccines, there is a chance of reactions. These are usually mild and go away on their own, but serious reactions are also possible. Problems reported following PCV13 varied by age and dose in the series. The most common problems reported among children were:  · About half became drowsy after the shot, had a temporary loss of appetite, or had redness or tenderness where the shot was given. · About 1 out of 3 had swelling where the shot was given. · About 1 out of 3 had a mild fever, and about 1 in 20 had a fever over 102.2°F.  · Up to about 8 out of 10 became fussy or irritable. Adults have reported pain, redness, and swelling where the shot was given; also mild fever, fatigue, headache, chills, or muscle pain. Beba Jonah children who get PCV13 along with inactivated flu vaccine at the same time may be at increased risk for seizures caused by fever. Ask your doctor for more information. Problems that could happen after any vaccine:  · People sometimes faint after a medical procedure, including vaccination. Sitting or lying down for about 15 minutes can help prevent fainting and the injuries caused by a fall. Tell your doctor if you feel dizzy or have vision changes or ringing in the ears. · Some older children and adults get severe pain in the shoulder and have difficulty moving the arm where a shot was given. This happens very rarely.   · Any medication can cause a severe allergic reaction. Such reactions from a vaccine are very rare, estimated at about 1 in a million doses, and would happen within a few minutes to a few hours after the vaccination. As with any medicine, there is a very small chance of a vaccine causing a serious injury or death. The safety of vaccines is always being monitored. For more information, visit: www.cdc.gov/vaccinesafety. What if there is a serious reaction? What should I look for? · Look for anything that concerns you, such as signs of a severe allergic reaction, very high fever, or unusual behavior. Signs of a severe allergic reaction can include hives, swelling of the face and throat, difficulty breathing, a fast heartbeat, dizziness, and weakness, usually within a few minutes to a few hours after the vaccination. What should I do? · If you think it is a severe allergic reaction or other emergency that can't wait, call 911 or get the person to the nearest hospital. Otherwise, call your doctor. · Reactions should be reported to the Vaccine Adverse Event Reporting System (VAERS). Your doctor should file this report, or you can do it yourself through the VAERS website at www.vaers. [facility].gov, or by calling 7-303.201.6601. VAERS does not give medical advice. The National Vaccine Injury Compensation Program  The National Vaccine Injury Compensation Program (VICP) is a federal program that was created to compensate people who may have been injured by certain vaccines. Persons who believe they may have been injured by a vaccine can learn about the program and about filing a claim by calling 3-794.122.2790 or visiting the [employer] website at www.[facility].gov/vaccinecompensation. There is a time limit to file a claim for compensation. How can I learn more? · Ask your healthcare provider. He or she can give you the vaccine package insert or suggest other sources of information. · Call your local or state health department.   · Contact the [facility] Disease Control and Prevention (CDC):  ¨ Call 9-348.930.3538 (1-800-CDC-INFO) or  ¨ Visit CDC's website at www.cdc.gov/vaccines  Vaccine Information Statement  PCV13 Vaccine  11/5/2015  42 LAMIN [address] [id number]-92  Department of Health and Human Services  Centers for Disease Control and Prevention  Many Vaccine Information Statements are available in Ukrainian and other languages. See www.immunize.org/vis. Muchas hojas de información sobre vacunas están disponibles en español y en otros idiomas. Visite www.immunize.org/vis. Care instructions adapted under license by [employer] (which disclaims liability or warranty for this information). If you have questions about a medical condition or this instruction, always ask your healthcare professional. [address] any warranty or liability for your use of this information.

## 2018-10-02 NOTE — MR AVS SNAPSHOT
[address] [facility] [address] [facility] [address] 
[phone] Patient: Gia Infante MRN: [id number] QQ Visit Information Date & Time Provider Department Dept. Phone Encounter #  
 10/2/2018  8:00 AM Grady Abbott, [address] [place] [address] [id number] Follow-up Instructions Return as scheduled. Your Appointments 2019  8:00 AM  
Complete Physical with Grady Abbott MD  
[facility] ([facility]) Appt Note: cpe  
 [address] [facility] [address]  
[address] [facility] [address] [facility] [address] Upcoming Health Maintenance Date Due Shingrix Vaccine Age 50> (1 of 2) 12/10/2002 MEDICARE YEARLY EXAM 2018 Pneumococcal 65+ Low/Medium Risk (2 of 2 - PPSV23) 10/2/2019 GLAUCOMA SCREENING Q2Y 2020 COLONOSCOPY 2020 DTaP/Tdap/Td series (2 - Td) 2025 Allergies as of 10/2/2018  Review Complete On: 10/2/2018 By: Patti Harris LPN No Known Allergies Current Immunizations  Reviewed on 9/10/2018 Name Date Influenza Vaccine (Quad) PF 2017 Influenza Vaccine (Tri) Adjuvanted 9/10/2018 Pneumococcal Conjugate (PCV-13) 10/2/2018 Tdap 2015 Zoster Vaccine, Live 2015 Not reviewed this visit You Were Diagnosed With   
  
 Codes Comments Encounter for immunization    -  Primary ICD-10-CM: F40 ICD-9-CM: V03.89 Vitals Temp Smoking Status 98.5 °F (36.9 °C) (Oral) Never Smoker Vitals History Preferred Pharmacy Pharmacy Name Phone [id number] Melissa Mendez, [address] [facility] [place], [place],[address] [facility] [phone] Your Updated Medication List  
  
   
This list is accurate as of 10/2/18  8:14 AM.  Always use your most recent med list.  
  
  
  
  
 aspirin delayed-release 81 mg tablet Take 81 mg by mouth daily. atorvastatin 20 mg tablet Commonly known as:  LIPITOR Take 1 Tab by mouth daily. calcium citrate 200 mg (950 mg) tablet Take 200 mg by mouth daily. cyanocobalamin 1,000 mcg sublingual tablet Commonly known as:  VITAMIN B-12 Take 1 Tab by mouth daily. glucosamine sulfate 500 mg tablet Take 500 mg by mouth daily. metoprolol succinate 50 mg XL tablet Commonly known as:  TOPROL-XL  
TAKE 1 TAB BY MOUTH DAILY. multivitamin, tx-iron-ca-min 9 mg iron-400 mcg Tab tablet Commonly known as:  THERA-M w/ IRON Take 1 Tab by mouth daily. We Performed the Following PNEUMOCOCCAL CONJ VACCINE 13 VALENT IM [id number] CPT(R)] TX IMMUNIZ ADMIN,1 SINGLE/COMB VAC/TOXOID [id number] CPT(R)] Follow-up Instructions Return as scheduled. Patient Instructions Pneumococcal Conjugate Vaccine (PCV13): What You Need to Know Why get vaccinated? Vaccination can protect both children and adults from pneumococcal disease. Pneumococcal disease is caused by bacteria that can spread from person to person through close contact. It can cause ear infections, and it can also lead to more serious infections of the: 
· Lungs (pneumonia). · Blood (bacteremia). · Covering of the brain and spinal cord (meningitis). Pneumococcal pneumonia is most common among adults. Pneumococcal meningitis can cause deafness and brain damage, and it kills about 1 child in 10 who get it. Anyone can get pneumococcal disease, but children under 3years of age and adults 72 years and older, people with certain medical conditions, and cigarette smokers are at the highest risk. Before there was a vaccine, the [facility] saw the following in children under 5 each year from pneumococcal disease: · More than 700 cases of meningitis · About 13,000 blood infections · About 5 million ear infections · About 200 deaths Since the vaccine became available, severe pneumococcal disease in these children has fallen by 88%. About 18,000 older adults die of pneumococcal disease each year in the [place]. Treatment of pneumococcal infections with penicillin and other drugs is not as effective as it used to be, because some strains of the disease have become resistant to these drugs. This makes prevention of the disease through vaccination even more important. PCV13 vaccine Pneumococcal conjugate vaccine (called PCV13) protects against 13 types of pneumococcal bacteria. PCV13 is routinely given to children at 2, 4, 6, and 1515 months of age. It is also recommended for children and adults 3to 59years of age with certain health conditions, and for all adults 72years of age and older. Your doctor can give you details. Some people should not get this vaccine Anyone who has ever had a life-threatening allergic reaction to a dose of this vaccine, to an earlier pneumococcal vaccine called PCV7, or to any vaccine containing diphtheria toxoid (for example, DTaP), should not get PCV13. Anyone with a severe allergy to any component of PCV13 should not get the vaccine. Tell your doctor if the person being vaccinated has any severe allergies. If the person scheduled for vaccination is not feeling well, your healthcare provider might decide to reschedule the shot on another day. Risks of a vaccine reaction With any medicine, including vaccines, there is a chance of reactions. These are usually mild and go away on their own, but serious reactions are also possible. Problems reported following PCV13 varied by age and dose in the series. The most common problems reported among children were: · About half became drowsy after the shot, had a temporary loss of appetite, or had redness or tenderness where the shot was given. · About 1 out of 3 had swelling where the shot was given. · About 1 out of 3 had a mild fever, and about 1 in 20 had a fever over 102.2°F. 
· Up to about 8 out of 10 became fussy or irritable. Adults have reported pain, redness, and swelling where the shot was given; also mild fever, fatigue, headache, chills, or muscle pain. [employer] children who get PCV13 along with inactivated flu vaccine at the same time may be at increased risk for seizures caused by fever. Ask your doctor for more information. Problems that could happen after any vaccine: · People sometimes faint after a medical procedure, including vaccination. Sitting or lying down for about 15 minutes can help prevent fainting and the injuries caused by a fall. Tell your doctor if you feel dizzy or have vision changes or ringing in the ears. · Some older children and adults get severe pain in the shoulder and have difficulty moving the arm where a shot was given. This happens very rarely. · Any medication can cause a severe allergic reaction. Such reactions from a vaccine are very rare, estimated at about 1 in a million doses, and would happen within a few minutes to a few hours after the vaccination. As with any medicine, there is a very small chance of a vaccine causing a serious injury or death. The safety of vaccines is always being monitored. For more information, visit: www.cdc.gov/vaccinesafety. What if there is a serious reaction? What should I look for? · Look for anything that concerns you, such as signs of a severe allergic reaction, very high fever, or unusual behavior. Signs of a severe allergic reaction can include hives, swelling of the face and throat, difficulty breathing, a fast heartbeat, dizziness, and weakness, usually within a few minutes to a few hours after the vaccination. What should I do? · If you think it is a severe allergic reaction or other emergency that can't wait, call 911 or get the person to the nearest hospital. Otherwise, call your doctor. · Reactions should be reported to the Vaccine Adverse Event Reporting System (VAERS). Your doctor should file this report, or you can do it yourself through the VAERS website at www.vaers. [facility].gov, or by calling 1-739.469.2781. VAERS does not give medical advice. The National Vaccine Injury Compensation Program 
The National Vaccine Injury Compensation Program (VICP) is a federal program that was created to compensate people who may have been injured by certain vaccines. Persons who believe they may have been injured by a vaccine can learn about the program and about filing a claim by calling 3-993.688.3407 or visiting the [employer] website at www.[facility].gov/vaccinecompensation. There is a time limit to file a claim for compensation. How can I learn more? · Ask your healthcare provider. He or she can give you the vaccine package insert or suggest other sources of information. · Call your local or state health department. · Contact the Centers for Disease Control and Prevention (CDC): 
¨ Call 9-965.236.7335 (1-800-CDC-INFO) or ¨ Visit CDC's website at www.cdc.gov/vaccines Vaccine Information Statement PCV13 Vaccine 11/5/2015 
[address] [id number] Department of [facility] and [employer] Centers for Disease Control and Prevention Many Vaccine Information Statements are available in Romansh and other languages. See www.immunize.org/vis. Muchas hojas de información sobre vacunas están disponibles en español y en otros idiomas. Visite www.immunize.org/vis. Care instructions adapted under license by [employer] (which disclaims liability or warranty for this information). If you have questions about a medical condition or this instruction, always ask your healthcare professional. [place] [address] any warranty or liability for your use of this information. Introducing hospitals & HEALTH SERVICES!    
 [facility] introduces [employer] patient portal. Now you can access parts of your medical record, email your doctor's office, and request medication refills online. 1. In your internet browser, go to [email or web address] 2. Click on the First Time User? Click Here link in the Sign In box. You will see the New Member Sign Up page. 3. Enter your [employer] Access Code exactly as it appears below. You will not need to use this code after youve completed the sign-up process. If you do not sign up before the expiration date, you must request a new code. · [employer] Access Code: [email or web address] Expires: 12/5/2018  8:29 AM 
 
4. Enter the last four digits of your Social Security Number (xxxx) and Date of Birth (mm/dd/yyyy) as indicated and click Submit. You will be taken to the next sign-up page. 5. Create a [employer] ID. This will be your [employer] login ID and cannot be changed, so think of one that is secure and easy to remember. 6. Create a [employer] password. You can change your password at any time. 7. Enter your Password Reset Question and Answer. This can be used at a later time if you forget your password. 8. Enter your e-mail address. You will receive e-mail notification when new information is available in [address] 9. Click Sign Up. You can now view and download portions of your medical record. 10. Click the Download Summary menu link to download a portable copy of your medical information. If you have questions, please visit the Frequently Asked Questions section of the [employer] website. Remember, [employer] is NOT to be used for urgent needs. For medical emergencies, dial 911. Now available from your iPhone and Android! Please provide this summary of care documentation to your next provider. Your primary care clinician is listed as Augie Armstrong. If you have any questions after today's visit, please call [phone].

## 2018-11-06 ENCOUNTER — TELEPHONE (OUTPATIENT)
Dept: FAMILY MEDICINE CLINIC | Age: 68
End: 2018-11-06

## 2018-11-06 NOTE — TELEPHONE ENCOUNTER
Nurse to clarify concerns -    Is he asking about Tdap? If so, he is up to date. Is he concerned that he has TB? We only test if there are exposure or symptom concerns. For example, recent travel outside the country to high risk areas, close contacts that have TB, working in a hospital setting. See document for possible questions to ask:    [email or web address]no. pdf

## 2018-11-06 NOTE — TELEPHONE ENCOUNTER
Patient called and is asking when his lat TB test was. I let him know I did not see one on his chart from my review but I would send a message back and have the nurse review the chart to make sure and let him know. He is also asking how often this is needed. I let him know that a nurse would need to instruct him on that. He can be reached at [phone].

## 2018-11-07 NOTE — TELEPHONE ENCOUNTER
Spoke with patient. He will need PPD placement for [facility] system. Patient scheduled.

## 2018-11-19 ENCOUNTER — CLINICAL SUPPORT (OUTPATIENT)
Dept: FAMILY MEDICINE CLINIC | Age: 68
End: 2018-11-19

## 2018-11-19 VITALS — TEMPERATURE: 98.2 F | BODY MASS INDEX: 26.29 KG/M2 | HEIGHT: 64 IN | WEIGHT: 154 LBS

## 2018-11-19 DIAGNOSIS — Z11.1 SCREENING-PULMONARY TB: Primary | ICD-10-CM

## 2018-11-19 NOTE — PATIENT INSTRUCTIONS
Tuberculin Skin Test: Care Instructions  Your Care Instructions    Tuberculosis (TB) is a bacterial infection that can damage the lungs or other parts of the body. The TB skin test can tell if you have TB bacteria in your body. Many people are exposed to TB and test positive for TB bacteria in their bodies, but they don't get the disease. TB bacteria can stay in your body without making you sick. This is because your immune system can keep TB in check. Your doctor may want you to have a TB skin test if you have been in close contact with someone who has TB. Or you may need the test if you have symptoms that might be caused by TB, such as a cough that does not go away, a fever, or weight loss. You also may get the test if you are a health care worker. During the skin test, part of a TB bacterium is injected under your skin. The test will feel like a skin prick. If you have TB bacteria in your body, a firm red bump will form at the shot site within 2 days. If the test shows that you are infected with TB (positive), your doctor probably will order more tests. A TB-positive skin test can't tell when you became infected with TB. And it can't tell whether the infection can be passed to others. Follow-up care is a key part of your treatment and safety. Be sure to make and go to all appointments, and call your doctor if you are having problems. It's also a good idea to know your test results and keep a list of the medicines you take. How can you care for yourself at home? · Do not scratch the test site. Scratching it may cause redness or swelling. This could affect the test results. · To ease itching, put a cold washcloth on the site. Then pat the site dry. · Do not cover the test site with a bandage or other dressing. · Go back to your doctor's office or hospital to have the test read on the follow-up date. This must be done between 48 and 72 hours after you get the shot. When should you call for help?   Watch closely for changes in your health, and be sure to contact your doctor if you have any problems. Where can you learn more? Go to http://kevin-isabel.info/. Enter ([phone] 7660-2523 in the search box to learn more about \"Tuberculin Skin Test: Care Instructions. \"  Current as of: November 18, 2017  Content Version: 11.8  © 8058-3348 [employer]. Care instructions adapted under license by [employer] (which disclaims liability or warranty for this information). If you have questions about a medical condition or this instruction, always ask your healthcare professional. [place] [address] any warranty or liability for your use of this information.

## 2018-11-19 NOTE — PROGRESS NOTES
PPD Placement note    Bhavik Ramsey, 74 y.o. male is here today for placement of PPD test.  Tuberculin skin test applied to right ventral forearm. Reason for PPD test: Employment    Pt Does not recall having taken PPD test before. Has any result ever been positive? Unsure-consulted with Dr. Belen Fang prior to placement to check for ok to place. Given verbal to place PPD. Patient  has verified they are not allergic to the products PPD is made of (Phenol or Tween). The patient denies cough. If cough:  productive for NA .       denies night sweats. denies hemoptysis. The patient is not taking any oral or IV steroid medication now, and has not taken any within the last month? Has the patient ever received the BCG vaccine?: Unknown  Has the patient been in recent contact with anyone known or suspected of having active TB disease?: no       Date of exposure (if applicable): NA       Name of person they were exposed to (if applicable): NA  Patient's Country of origin?: [facility]    O: Alert and oriented in NAD. P:  PPD placed on 11/19/2018.   Time placed 11:31am  Patient advised to return for reading Wed 11/21/2018 after 11:31 but before end of clinic day    Lot# [id number]  11/20/20  Mobi  [id number]

## 2018-11-19 NOTE — PROGRESS NOTES
1. Have you been to the ER, urgent care clinic since your last visit? Hospitalized since your last visit? No    2. Have you seen or consulted any other health care providers outside of the [address] [place] [address] since your last visit? Include any pap smears or colon screening.  No

## 2018-11-21 ENCOUNTER — CLINICAL SUPPORT (OUTPATIENT)
Dept: FAMILY MEDICINE CLINIC | Age: 68
End: 2018-11-21

## 2018-11-21 DIAGNOSIS — Z11.1 SCREENING-PULMONARY TB: Primary | ICD-10-CM

## 2018-11-21 LAB
MM INDURATION POC: 0 MM (ref 0–5)
PPD POC: NEGATIVE NEGATIVE

## 2018-11-21 NOTE — PATIENT INSTRUCTIONS
Tuberculin Skin Test: Care Instructions  Your Care Instructions    Tuberculosis (TB) is a bacterial infection that can damage the lungs or other parts of the body. The TB skin test can tell if you have TB bacteria in your body. Many people are exposed to TB and test positive for TB bacteria in their bodies, but they don't get the disease. TB bacteria can stay in your body without making you sick. This is because your immune system can keep TB in check. Your doctor may want you to have a TB skin test if you have been in close contact with someone who has TB. Or you may need the test if you have symptoms that might be caused by TB, such as a cough that does not go away, a fever, or weight loss. You also may get the test if you are a health care worker. During the skin test, part of a TB bacterium is injected under your skin. The test will feel like a skin prick. If you have TB bacteria in your body, a firm red bump will form at the shot site within 2 days. If the test shows that you are infected with TB (positive), your doctor probably will order more tests. A TB-positive skin test can't tell when you became infected with TB. And it can't tell whether the infection can be passed to others. Follow-up care is a key part of your treatment and safety. Be sure to make and go to all appointments, and call your doctor if you are having problems. It's also a good idea to know your test results and keep a list of the medicines you take. How can you care for yourself at home? · Do not scratch the test site. Scratching it may cause redness or swelling. This could affect the test results. · To ease itching, put a cold washcloth on the site. Then pat the site dry. · Do not cover the test site with a bandage or other dressing. · Go back to your doctor's office or hospital to have the test read on the follow-up date. This must be done between 48 and 72 hours after you get the shot. When should you call for help?   Watch closely for changes in your health, and be sure to contact your doctor if you have any problems. Where can you learn more? Go to http://kevin-isabel.info/. Enter ([phone] [phone] in the search box to learn more about \"Tuberculin Skin Test: Care Instructions. \"  Current as of: November 18, 2017  Content Version: 11.8  © 5944-3885 [employer]. Care instructions adapted under license by [employer] (which disclaims liability or warranty for this information). If you have questions about a medical condition or this instruction, always ask your healthcare professional. [place] [address] any warranty or liability for your use of this information.

## 2019-07-15 ENCOUNTER — TELEPHONE (OUTPATIENT)
Dept: FAMILY MEDICINE CLINIC | Age: 69
End: 2019-07-15

## 2019-07-15 DIAGNOSIS — I10 ESSENTIAL HYPERTENSION: Primary | ICD-10-CM

## 2019-07-15 DIAGNOSIS — Z12.5 PROSTATE CANCER SCREENING: ICD-10-CM

## 2019-07-15 DIAGNOSIS — E78.5 HYPERLIPIDEMIA, UNSPECIFIED HYPERLIPIDEMIA TYPE: ICD-10-CM

## 2019-07-15 NOTE — TELEPHONE ENCOUNTER
[facility] [phone] advising patient that lab order is available for .

## 2019-07-15 NOTE — TELEPHONE ENCOUNTER
Pt has an appt with his cardiologist on 7/18/2019 and would like to get his yearly labs done tomorrow if possible. He has a cpe scheduled with Dr Oziel Celestin on 9/10/2019. Would like to pick the orders today. Please advise.

## 2019-07-17 LAB
ALBUMIN SERPL-MCNC: 4.1 G/DL (ref 3.6–4.8)
ALBUMIN/GLOB SERPL: 1.4 {RATIO} (ref 1.2–2.2)
ALP SERPL-CCNC: 78 IU/L (ref 39–117)
ALT SERPL-CCNC: 10 IU/L (ref 0–44)
AST SERPL-CCNC: 21 IU/L (ref 0–40)
BILIRUB SERPL-MCNC: 0.4 MG/DL (ref 0–1.2)
BUN SERPL-MCNC: 25 MG/DL (ref 8–27)
BUN/CREAT SERPL: 20 (ref 10–24)
CALCIUM SERPL-MCNC: 9.6 MG/DL (ref 8.6–10.2)
CHLORIDE SERPL-SCNC: 103 MMOL/L (ref 96–106)
CHOLEST SERPL-MCNC: 152 MG/DL (ref 100–199)
CO2 SERPL-SCNC: 21 MMOL/L (ref 20–29)
CREAT SERPL-MCNC: 1.26 MG/DL (ref 0.76–1.27)
ERYTHROCYTE [DISTWIDTH] IN BLOOD BY AUTOMATED COUNT: 14.9 % (ref 12.3–15.4)
GLOBULIN SER CALC-MCNC: 2.9 G/DL (ref 1.5–4.5)
GLUCOSE SERPL-MCNC: 92 MG/DL (ref 65–99)
HCT VFR BLD AUTO: 39.7 % (ref 37.5–51)
HDLC SERPL-MCNC: 42 MG/DL
HGB BLD-MCNC: 13.5 G/DL (ref 13–17.7)
INTERPRETATION, 910389: NORMAL
INTERPRETATION: NORMAL
LDLC SERPL CALC-MCNC: 88 MG/DL (ref 0–99)
MCH RBC QN AUTO: 27.1 PG (ref 26.6–33)
MCHC RBC AUTO-ENTMCNC: 34 G/DL (ref 31.5–35.7)
MCV RBC AUTO: 80 FL (ref 79–97)
PDF IMAGE, 910387: NORMAL
PLATELET # BLD AUTO: 200 X10E3/UL (ref 150–450)
POTASSIUM SERPL-SCNC: 5 MMOL/L (ref 3.5–5.2)
PROT SERPL-MCNC: 7 G/DL (ref 6–8.5)
PSA SERPL-MCNC: 1.2 NG/ML (ref 0–4)
RBC # BLD AUTO: 4.99 X10E6/UL (ref 4.14–5.8)
SODIUM SERPL-SCNC: 140 MMOL/L (ref 134–144)
TRIGL SERPL-MCNC: 111 MG/DL (ref 0–149)
VLDLC SERPL CALC-MCNC: 22 MG/DL (ref 5–40)
WBC # BLD AUTO: 6.8 X10E3/UL (ref 3.4–10.8)

## 2019-07-18 ENCOUNTER — OFFICE VISIT (OUTPATIENT)
Dept: CARDIOLOGY CLINIC | Age: 69
End: 2019-07-18

## 2019-07-18 VITALS
HEART RATE: 65 BPM | WEIGHT: 153 LBS | DIASTOLIC BLOOD PRESSURE: 95 MMHG | BODY MASS INDEX: 26.12 KG/M2 | SYSTOLIC BLOOD PRESSURE: 130 MMHG | HEIGHT: 64 IN

## 2019-07-18 DIAGNOSIS — I10 ESSENTIAL HYPERTENSION: ICD-10-CM

## 2019-07-18 DIAGNOSIS — I25.119 ATHEROSCLEROSIS OF NATIVE CORONARY ARTERY OF NATIVE HEART WITH ANGINA PECTORIS (HCC): Primary | ICD-10-CM

## 2019-07-18 DIAGNOSIS — Z98.61 POSTSURGICAL PERCUTANEOUS TRANSLUMINAL CORONARY ANGIOPLASTY STATUS: ICD-10-CM

## 2019-07-18 DIAGNOSIS — E78.5 HYPERLIPIDEMIA, UNSPECIFIED HYPERLIPIDEMIA TYPE: ICD-10-CM

## 2019-07-18 NOTE — PROGRESS NOTES
1. Have you been to the ER, urgent care clinic since your last visit? Hospitalized since your last visit? No    2. Have you seen or consulted any other health care providers outside of the [address] [place] [address] since your last visit? Include any pap smears or colon screening.  No

## 2019-07-18 NOTE — PROGRESS NOTES
HISTORY OF PRESENT ILLNESS  Ranulfo Gonzalez is a 79 y.o. male. Patient with MATY Wolff 51. On follow up he has complained of shortness of breath at times on exertion. No orthopnea PND denies any chest pain or tightness. No peripheral edema. Hypertension   The history is provided by the patient. This is a new problem. The problem occurs constantly. The problem has not changed since onset. Associated symptoms include headaches. Pertinent negatives include no chest pain, no abdominal pain and no shortness of breath. Nothing aggravates the symptoms. Review of Systems   Constitutional: Negative for chills and fever. HENT: Negative for nosebleeds. Eyes: Negative for blurred vision and double vision. Respiratory: Negative for cough, hemoptysis, sputum production, shortness of breath and wheezing. Cardiovascular: Negative for chest pain, palpitations, orthopnea, claudication, leg swelling and PND. Gastrointestinal: Negative for abdominal pain, heartburn, nausea and vomiting. Musculoskeletal: Negative for myalgias. Skin: Negative for rash. Neurological: Positive for headaches. Negative for dizziness and weakness. Endo/Heme/Allergies: Does not bruise/bleed easily. History reviewed. No pertinent family history. Past Medical History:   Diagnosis Date    Coronary atherosclerosis of native coronary artery     No angina, H/O stent     H/O colonoscopy at 51yo    patient report - unable to get records    H/O colonoscopy with polypectomy 08/2017    polyp - follow-up 3 years    HTN (hypertension)     Other and unspecified hyperlipidemia     On Lipitor    Postsurgical percutaneous transluminal coronary angioplasty status        Past Surgical History:   Procedure Laterality Date    HX CORONARY STENT PLACEMENT  2003    LAD/JENY       Social History     Tobacco Use    Smoking status: Never Smoker    Smokeless tobacco: Never Used   Substance Use Topics    Alcohol use:  Yes Alcohol/week: 0.0 standard drinks     Comment: occasional       No Known Allergies    Current Outpatient Medications   Medication Sig    atorvastatin (LIPITOR) 20 mg tablet Take 1 Tab by mouth daily.  metoprolol succinate (TOPROL-XL) 50 mg XL tablet TAKE 1 TAB BY MOUTH DAILY.  calcium citrate 200 mg (950 mg) tablet Take 200 mg by mouth daily.  glucosamine sulfate 500 mg tablet Take 500 mg by mouth daily.  multivitamin, tx-iron-ca-min (THERA-M W/ IRON) 9 mg iron-400 mcg tab tablet Take 1 Tab by mouth daily.  cyanocobalamin (VITAMIN B-12) 1,000 mcg sublingual tablet Take 1 Tab by mouth daily.  aspirin delayed-release 81 mg tablet Take 81 mg by mouth daily. No current facility-administered medications for this visit. Visit Vitals  BP (!) 130/95   Pulse 65   Ht 5' 4\" (1.626 m)   Wt 69.4 kg (153 lb)   BMI 26.26 kg/m²         Physical Exam   Constitutional: He is oriented to person, place, and time. He appears well-developed and well-nourished. HENT:   Head: Normocephalic and atraumatic. Eyes: Conjunctivae are normal.   Neck: Neck supple. No JVD present. No tracheal deviation present. No thyromegaly present. Cardiovascular: Normal rate, regular rhythm and normal heart sounds. Exam reveals no gallop and no friction rub. No murmur heard. Pulmonary/Chest: Breath sounds normal. No respiratory distress. He has no wheezes. He has no rales. He exhibits no tenderness. Abdominal: Soft. There is no tenderness. Musculoskeletal: He exhibits no edema. Neurological: He is alert and oriented to person, place, and time. Skin: Skin is warm and dry. Psychiatric: He has a normal mood and affect. Mr. Gregorio Vasquez has a reminder for a \"due or due soon\" health maintenance. I have asked that he contact his primary care provider for follow-up on this health maintenance. NUCLEAR IMAGIN2016     Findings:   1.  Stress images reveal normal Myoview distrubution in all the LV segments in short axis, vertical and horizontal long axis views. 2. Resting images have a normal uptake. 3. Gated images reveal normal wall motion and the ejection fraction is calculated to be 86%. Conclusion:   1. Normal perfusion scan. 2. Normal wall motion and ejection fraction. 3. Low risk scan. Assessment       ICD-10-CM ICD-9-CM    1. Atherosclerosis of native coronary artery of native heart with angina pectoris (HCC) I25.119 414.01 NUCLEAR CARDIAC STRESS TEST     413.9     Episodes of shortness of breath on exertion. These are new. Will proceed with nuclear stress test   2. Essential hypertension I10 401.9     Mildly elevated diastolic pressure. Normal at home continue to monitor   3. Postsurgical percutaneous transluminal coronary angioplasty status Z98.61 V45.82     Stable   4. Hyperlipidemia, unspecified hyperlipidemia type E78.5 272.4     LDL controlled. Increase from last time continue with diet and current therapy   7/2019  Episode of shortness of breath possible angina equivalent. We will proceed with nuclear stress test.  Continue current therapy    There are no discontinued medications. No orders of the defined types were placed in this encounter. Follow-up and Dispositions    · Return for F/u after tests.

## 2019-07-18 NOTE — PATIENT INSTRUCTIONS
[employer] Activation    Thank you for requesting access to [employer]. Please follow the instructions below to securely access and download your online medical record. [employer] allows you to send messages to your doctor, view your test results, renew your prescriptions, schedule appointments, and more. How Do I Sign Up? 1. In your internet browser, go to [email or web address]. 2. Click on the First Time User? Click Here link in the Sign In box. You will see the New Member Sign Up page. 3. Enter your [employer] Access Code exactly as it appears below. You will not need to use this code after youve completed the sign-up process. If you do not sign up before the expiration date, you must request a new code. [employer] Access Code: [email or web address]  Expires: 2019  8:48 AM (This is the date your [employer] access code will )    4. Enter the last four digits of your Social Security Number (xxxx) and Date of Birth (mm/dd/yyyy) as indicated and click Submit. You will be taken to the next sign-up page. 5. Create a [employer] ID. This will be your [employer] login ID and cannot be changed, so think of one that is secure and easy to remember. 6. Create a [employer] password. You can change your password at any time. 7. Enter your Password Reset Question and Answer. This can be used at a later time if you forget your password. 8. Enter your e-mail address. You will receive e-mail notification when new information is available in [address]. 9. Click Sign Up. You can now view and download portions of your medical record. 10. Click the Download Summary menu link to download a portable copy of your medical information. Additional Information    If you have questions, please visit the Frequently Asked Questions section of the [employer] website at [email or web address] Remember, [employer] is NOT to be used for urgent needs. For medical emergencies, dial 911.

## 2019-08-26 RX ORDER — METOPROLOL SUCCINATE 50 MG/1
TABLET, EXTENDED RELEASE ORAL
Qty: 90 TAB | Refills: 3 | Status: SHIPPED | OUTPATIENT
Start: 2019-08-26 | End: 2020-08-12 | Stop reason: SDUPTHER

## 2019-09-23 ENCOUNTER — OFFICE VISIT (OUTPATIENT)
Dept: FAMILY MEDICINE CLINIC | Age: 69
End: 2019-09-23

## 2019-09-23 VITALS
BODY MASS INDEX: 26.8 KG/M2 | RESPIRATION RATE: 14 BRPM | DIASTOLIC BLOOD PRESSURE: 90 MMHG | WEIGHT: 157 LBS | OXYGEN SATURATION: 98 % | TEMPERATURE: 98.5 F | SYSTOLIC BLOOD PRESSURE: 130 MMHG | HEART RATE: 57 BPM | HEIGHT: 64 IN

## 2019-09-23 DIAGNOSIS — I10 ESSENTIAL HYPERTENSION: ICD-10-CM

## 2019-09-23 DIAGNOSIS — E78.5 HYPERLIPIDEMIA, UNSPECIFIED HYPERLIPIDEMIA TYPE: ICD-10-CM

## 2019-09-23 DIAGNOSIS — E66.3 OVERWEIGHT WITH BODY MASS INDEX (BMI) 25.0-29.9: ICD-10-CM

## 2019-09-23 DIAGNOSIS — I25.10 ATHEROSCLEROSIS OF NATIVE CORONARY ARTERY OF NATIVE HEART WITHOUT ANGINA PECTORIS: ICD-10-CM

## 2019-09-23 DIAGNOSIS — Z00.00 ROUTINE MEDICAL EXAM: Primary | ICD-10-CM

## 2019-09-23 DIAGNOSIS — Z23 ENCOUNTER FOR IMMUNIZATION: ICD-10-CM

## 2019-09-23 RX ORDER — ATORVASTATIN CALCIUM 20 MG/1
20 TABLET, FILM COATED ORAL DAILY
Qty: 90 TAB | Refills: 3 | Status: SHIPPED | OUTPATIENT
Start: 2019-09-23 | End: 2020-08-12 | Stop reason: SDUPTHER

## 2019-09-23 NOTE — PATIENT INSTRUCTIONS
Well Visit, Over 72: Care Instructions Your Care Instructions Physical exams can help you stay healthy. Your doctor has checked your overall health and may have suggested ways to take good care of yourself. He or she also may have recommended tests. At home, you can help prevent illness with healthy eating, regular exercise, and other steps. Follow-up care is a key part of your treatment and safety. Be sure to make and go to all appointments, and call your doctor if you are having problems. It's also a good idea to know your test results and keep a list of the medicines you take. How can you care for yourself at home? · Reach and stay at a healthy weight. This will lower your risk for many problems, such as obesity, diabetes, heart disease, and high blood pressure. · Get at least 30 minutes of exercise on most days of the week. Walking is a good choice. You also may want to do other activities, such as running, swimming, cycling, or playing tennis or team sports. · Do not smoke. Smoking can make health problems worse. If you need help quitting, talk to your doctor about stop-smoking programs and medicines. These can increase your chances of quitting for good. · Protect your skin from too much sun. When you're outdoors from 10 a.m. to 4 p.m., stay in the shade or cover up with clothing and a hat with a wide brim. Wear sunglasses that block UV rays. Even when it's cloudy, put broad-spectrum sunscreen (SPF 30 or higher) on any exposed skin. · See a dentist one or two times a year for checkups and to have your teeth cleaned. · Wear a seat belt in the car. Follow your doctor's advice about when to have certain tests. These tests can spot problems early. For men and women · Cholesterol. Your doctor will tell you how often to have this done based on your overall health and other things that can increase your risk for heart attack and stroke. · Blood pressure. Have your blood pressure checked during a routine doctor visit. Your doctor will tell you how often to check your blood pressure based on your age, your blood pressure results, and other factors. · Diabetes. Ask your doctor whether you should have tests for diabetes. · Vision. Experts recommend that you have yearly exams for glaucoma and other age-related eye problems. · Hearing. Tell your doctor if you notice any change in your hearing. You can have tests to find out how well you hear. · Colon cancer tests. Keep having colon cancer tests as your doctor recommends. You can have one of several types of tests. · Heart attack and stroke risk. At least every 4 to 6 years, you should have your risk for heart attack and stroke assessed. Your doctor uses factors such as your age, blood pressure, cholesterol, and whether you smoke or have diabetes to show what your risk for a heart attack or stroke is over the next 10 years. · Osteoporosis. Talk to your doctor about whether you should have a bone density test to find out whether you have thinning bones. Also ask your doctor about whether you should take calcium and vitamin D supplements. For women · Pap test and pelvic exam. You may no longer need a Pap test. Talk with your doctor about whether to stop or continue to have Pap tests. · Breast exam and mammogram. Ask how often you should have a mammogram, which is an X-ray of your breasts. A mammogram can spot breast cancer before it can be felt and when it is easiest to treat. · Thyroid disease. Talk to your doctor about whether to have your thyroid checked as part of a regular physical exam. Women have an increased chance of a thyroid problem. For men · Prostate exam. Talk to your doctor about whether you should have a blood test (called a PSA test) for prostate cancer.  Experts recommend that you discuss the benefits and risks of the test with your doctor before you decide whether to have this test. Some experts say that men ages 79 and older no longer need testing. · Abdominal aortic aneurysm. Ask your doctor whether you should have a test to check for an aneurysm. You may need a test if you ever smoked or if your parent, brother, sister, or child has had an aneurysm. When should you call for help? Watch closely for changes in your health, and be sure to contact your doctor if you have any problems or symptoms that concern you. Where can you learn more? Go to http://kevin-isabel.info/. Enter Z569 in the search box to learn more about \"Well Visit, Over 65: Care Instructions. \" Current as of: December 13, 2018 Content Version: 12.2 © 5752-1356 [employer]. Care instructions adapted under license by [employer] (which disclaims liability or warranty for this information). If you have questions about a medical condition or this instruction, always ask your healthcare professional. [place] [address] any warranty or liability for your use of this information.

## 2019-09-23 NOTE — PROGRESS NOTES
1. Have you been to the ER, urgent care clinic since your last visit? Hospitalized since your last visit? No    2. Have you seen or consulted any other health care providers outside of the [address] [place] [address] since your last visit? Include any pap smears or colon screening. No    Physician order obtained. Patient completed adult immunization consent form. Allergies, contraindications and recommendations reviewed with patient. Seasonal high dose influenza vaccine administered IM left deltoid. Patient tolerated well. Patient remained in office for 15 minutes after injection and no adverse reactions were noted.

## 2019-09-23 NOTE — PROGRESS NOTES
Chief Complaint   Patient presents with    Hypertension    Cholesterol Problem     Subjective:   Chinyere Marvin is a 79 y.o. male presenting for his annual checkup. ROS:  Feeling well. No dyspnea or chest pain on exertion. No abdominal pain, change in bowel habits, black or bloody stools. No urinary tract or prostatic symptoms. No neurological complaints. Specific concerns today:   No concerns today. Saw cardiology this summer and had testing. Had mildly elevated BP there as well. No changes made. He has noted fluctuating BPs. Current Outpatient Medications   Medication Sig Dispense Refill    atorvastatin (LIPITOR) 20 mg tablet Take 1 Tab by mouth daily. 90 Tab 3    metoprolol succinate (TOPROL-XL) 50 mg XL tablet TAKE 1 TABLET BY MOUTH EVERY DAY 90 Tab 3    calcium citrate 200 mg (950 mg) tablet Take 200 mg by mouth daily.  glucosamine sulfate 500 mg tablet Take 500 mg by mouth daily.  multivitamin, tx-iron-ca-min (THERA-M W/ IRON) 9 mg iron-400 mcg tab tablet Take 1 Tab by mouth daily.  aspirin delayed-release 81 mg tablet Take 81 mg by mouth daily.  cyanocobalamin (VITAMIN B-12) 1,000 mcg sublingual tablet Take 1 Tab by mouth daily. 27 Tab 11     No Known Allergies  Past Medical History:   Diagnosis Date    Coronary atherosclerosis of native coronary artery     No angina, H/O stent     H/O colonoscopy at 53yo    patient report - unable to get records    H/O colonoscopy with polypectomy 08/2017    polyp - follow-up 3 years    HTN (hypertension)     Other and unspecified hyperlipidemia     On Lipitor    Postsurgical percutaneous transluminal coronary angioplasty status      Past Surgical History:   Procedure Laterality Date    HX CORONARY STENT PLACEMENT  2003    LAD/JENY     History reviewed. No pertinent family history. Social History     Tobacco Use    Smoking status: Never Smoker    Smokeless tobacco: Never Used   Substance Use Topics    Alcohol use:  Yes Alcohol/week: 0.0 standard drinks     Frequency: Monthly or less     Drinks per session: 1 or 2     Binge frequency: Never     Comment: occasional      Objective:     Blood pressure 130/90, pulse (!) 57, temperature 98.5 °F (36.9 °C), temperature source Oral, resp. rate 14, height 5' 4\" (1.626 m), weight 157 lb (71.2 kg), SpO2 98 %. The patient appears well, alert, oriented x 3, in no distress. ENT normal.  Neck supple. No adenopathy or thyromegaly. MARQUISE. Lungs are clear, good air entry, no wheezes, rhonchi or rales. S1 and S2 normal, no murmurs, regular rate and rhythm. Abdomen is soft without tenderness, guarding, mass or organomegaly. Extremities show no edema, normal peripheral pulses. Neurological is normal without focal findings. Psych: normal affect. Mood good. Oriented x 3. Judgement and insight intact. Assessment/Plan:       ICD-10-CM ICD-9-CM    1. Routine medical exam Z00.00 V70.0    2. Atherosclerosis of native coronary artery of native heart without angina pectoris I25.10 414.01    3. Essential hypertension I10 401.9    4. Hyperlipidemia, unspecified hyperlipidemia type E78.5 272.4 atorvastatin (LIPITOR) 20 mg tablet   5. Overweight with body mass index (BMI) 25.0-29.9 E66.3 278.02    6. Encounter for immunization Z23 V03.89 [place] IMMUNIZ ADMIN,1 SINGLE/COMB VAC/TOXOID      INFLUENZA VACCINE INACTIVATED (IIV), SUBUNIT, ADJUVANTED, IM     Age and sex specific counseling. Det and exercise for BMI>25. Elevated BP but cardiology aware and home BPs are normal.    Cont lipitor. Refills sent in. Cont regular cardiology follow-up. Reviewed labs. Flu vaccine administered. All chart history elements were reviewed by me at the time of the visit even though marked at time of note closure. Patient understands our medical plan. Patient has provided input and agrees with goals. Alternatives have been explained and offered. All questions answered.   The patient is to call if condition worsens or fails to improve.      Follow-up and Dispositions    · Return in about 1 year (around 9/23/2020) for physical.

## 2020-08-12 DIAGNOSIS — E78.5 HYPERLIPIDEMIA, UNSPECIFIED HYPERLIPIDEMIA TYPE: ICD-10-CM

## 2020-08-12 RX ORDER — ATORVASTATIN CALCIUM 20 MG/1
20 TABLET, FILM COATED ORAL DAILY
Qty: 90 TAB | Refills: 3 | Status: SHIPPED | OUTPATIENT
Start: 2020-08-12 | End: 2021-02-15 | Stop reason: SDUPTHER

## 2020-08-12 RX ORDER — METOPROLOL SUCCINATE 50 MG/1
TABLET, EXTENDED RELEASE ORAL
Qty: 90 TAB | Refills: 3 | Status: SHIPPED | OUTPATIENT
Start: 2020-08-12 | End: 2020-09-02 | Stop reason: SDUPTHER

## 2020-08-31 ENCOUNTER — TELEPHONE (OUTPATIENT)
Dept: FAMILY MEDICINE CLINIC | Age: 70
End: 2020-08-31

## 2020-08-31 DIAGNOSIS — E78.5 HYPERLIPIDEMIA, UNSPECIFIED HYPERLIPIDEMIA TYPE: ICD-10-CM

## 2020-08-31 DIAGNOSIS — Z12.5 PROSTATE CANCER SCREENING: ICD-10-CM

## 2020-08-31 DIAGNOSIS — I10 ESSENTIAL HYPERTENSION: Primary | ICD-10-CM

## 2020-08-31 NOTE — TELEPHONE ENCOUNTER
Pt has scheduled his CPE on 10/1/2020 and would like to have his labs done before he comes in. Please advise.

## 2020-09-02 RX ORDER — METOPROLOL SUCCINATE 50 MG/1
TABLET, EXTENDED RELEASE ORAL
Qty: 90 TAB | Refills: 3 | Status: SHIPPED | OUTPATIENT
Start: 2020-09-02 | End: 2021-11-30

## 2020-09-03 ENCOUNTER — HOSPITAL ENCOUNTER (OUTPATIENT)
Dept: LAB | Age: 70
Discharge: HOME | End: 2020-09-03
Payer: COMMERCIAL

## 2020-09-03 DIAGNOSIS — Z12.5 PROSTATE CANCER SCREENING: ICD-10-CM

## 2020-09-03 DIAGNOSIS — I10 ESSENTIAL HYPERTENSION: ICD-10-CM

## 2020-09-03 DIAGNOSIS — E78.5 HYPERLIPIDEMIA, UNSPECIFIED HYPERLIPIDEMIA TYPE: ICD-10-CM

## 2020-09-03 LAB
ALBUMIN SERPL-MCNC: 3.5 G/DL (ref 3.4–5)
ALBUMIN/GLOB SERPL: 1 {RATIO} (ref 0.8–1.7)
ALP SERPL-CCNC: 82 U/L (ref 45–117)
ALT SERPL-CCNC: 12 U/L (ref 16–61)
ANION GAP SERPL CALC-SCNC: 2 MMOL/L (ref 3–18)
AST SERPL-CCNC: 17 U/L (ref 10–38)
BASOPHILS # BLD: 0 K/UL (ref 0–0.1)
BASOPHILS NFR BLD: 1 % (ref 0–2)
BILIRUB SERPL-MCNC: 0.5 MG/DL (ref 0.2–1)
BUN SERPL-MCNC: 19 MG/DL (ref 7–18)
BUN/CREAT SERPL: 17 (ref 12–20)
CALCIUM SERPL-MCNC: 8.8 MG/DL (ref 8.5–10.1)
CHLORIDE SERPL-SCNC: 110 MMOL/L (ref 100–111)
CHOLEST SERPL-MCNC: 137 MG/DL
CO2 SERPL-SCNC: 30 MMOL/L (ref 21–32)
CREAT SERPL-MCNC: 1.14 MG/DL (ref 0.6–1.3)
DIFFERENTIAL METHOD BLD: ABNORMAL
EOSINOPHIL # BLD: 0.4 K/UL (ref 0–0.4)
EOSINOPHIL NFR BLD: 6 % (ref 0–5)
ERYTHROCYTE [DISTWIDTH] IN BLOOD BY AUTOMATED COUNT: 13.2 % (ref 11.6–14.5)
GLOBULIN SER CALC-MCNC: 3.6 G/DL (ref 2–4)
GLUCOSE SERPL-MCNC: 87 MG/DL (ref 74–99)
HCT VFR BLD AUTO: 39.6 % (ref 36–48)
HDLC SERPL-MCNC: 44 MG/DL (ref 40–60)
HDLC SERPL: 3.1 {RATIO} (ref 0–5)
HGB BLD-MCNC: 13.2 G/DL (ref 13–16)
LDLC SERPL CALC-MCNC: 69.8 MG/DL (ref 0–100)
LIPID PROFILE,FLP: NORMAL
LYMPHOCYTES # BLD: 1.7 K/UL (ref 0.9–3.6)
LYMPHOCYTES NFR BLD: 27 % (ref 21–52)
MCH RBC QN AUTO: 28 PG (ref 24–34)
MCHC RBC AUTO-ENTMCNC: 33.3 G/DL (ref 31–37)
MCV RBC AUTO: 84.1 FL (ref 74–97)
MONOCYTES # BLD: 0.7 K/UL (ref 0.05–1.2)
MONOCYTES NFR BLD: 11 % (ref 3–10)
NEUTS SEG # BLD: 3.5 K/UL (ref 1.8–8)
NEUTS SEG NFR BLD: 55 % (ref 40–73)
PLATELET # BLD AUTO: 226 K/UL (ref 135–420)
PMV BLD AUTO: 10 FL (ref 9.2–11.8)
POTASSIUM SERPL-SCNC: 4.9 MMOL/L (ref 3.5–5.5)
PROT SERPL-MCNC: 7.1 G/DL (ref 6.4–8.2)
PSA SERPL-MCNC: 1.6 NG/ML (ref 0–4)
RBC # BLD AUTO: 4.71 M/UL (ref 4.7–5.5)
SODIUM SERPL-SCNC: 142 MMOL/L (ref 136–145)
TRIGL SERPL-MCNC: 116 MG/DL (ref ?–150)
VLDLC SERPL CALC-MCNC: 23.2 MG/DL
WBC # BLD AUTO: 6.2 K/UL (ref 4.6–13.2)

## 2020-09-03 PROCEDURE — 80061 LIPID PANEL: CPT

## 2020-09-03 PROCEDURE — 80053 COMPREHEN METABOLIC PANEL: CPT

## 2020-09-03 PROCEDURE — 85025 COMPLETE CBC W/AUTO DIFF WBC: CPT

## 2020-09-03 PROCEDURE — 84153 ASSAY OF PSA TOTAL: CPT

## 2020-09-03 PROCEDURE — 36415 COLL VENOUS BLD VENIPUNCTURE: CPT

## 2020-09-30 ENCOUNTER — TELEPHONE (OUTPATIENT)
Dept: FAMILY MEDICINE CLINIC | Age: 70
End: 2020-09-30

## 2020-09-30 NOTE — TELEPHONE ENCOUNTER
Have you been diagnosed with, tested for, or told that you are suspected of having COVID-19 (coronovirus)? Have you had a fever or taken medication to treat a fever in the past 72 hours? Have you had a cough, SOB, or flu-like symptoms within the past 3 days? Have you had direct contact with someone who tested positive for COVID-19 within the past 14 days? Do you have a household member with flu-like symptoms, including fever, cough, or SOB? Do you currently have flu-like symptoms including fever, cough, or SOB? Are you experiencing new loss of taste or smell?      ALL ANSWERS TO THE ABOVE QUESTIONS IS NO

## 2020-10-01 ENCOUNTER — OFFICE VISIT (OUTPATIENT)
Dept: FAMILY MEDICINE CLINIC | Age: 70
End: 2020-10-01
Payer: COMMERCIAL

## 2020-10-01 VITALS
SYSTOLIC BLOOD PRESSURE: 126 MMHG | TEMPERATURE: 98.2 F | DIASTOLIC BLOOD PRESSURE: 84 MMHG | RESPIRATION RATE: 14 BRPM | HEIGHT: 64 IN | WEIGHT: 152 LBS | BODY MASS INDEX: 25.95 KG/M2 | HEART RATE: 64 BPM | OXYGEN SATURATION: 99 %

## 2020-10-01 DIAGNOSIS — I25.119 ATHEROSCLEROSIS OF NATIVE CORONARY ARTERY OF NATIVE HEART WITH ANGINA PECTORIS (HCC): ICD-10-CM

## 2020-10-01 DIAGNOSIS — Z00.00 ROUTINE MEDICAL EXAM: Primary | ICD-10-CM

## 2020-10-01 DIAGNOSIS — E66.3 OVERWEIGHT WITH BODY MASS INDEX (BMI) 25.0-29.9: ICD-10-CM

## 2020-10-01 DIAGNOSIS — E78.5 HYPERLIPIDEMIA, UNSPECIFIED HYPERLIPIDEMIA TYPE: ICD-10-CM

## 2020-10-01 DIAGNOSIS — I10 ESSENTIAL HYPERTENSION: ICD-10-CM

## 2020-10-01 DIAGNOSIS — Z23 ENCOUNTER FOR IMMUNIZATION: ICD-10-CM

## 2020-10-01 DIAGNOSIS — B35.6 TINEA CRURIS: ICD-10-CM

## 2020-10-01 PROCEDURE — 90472 IMMUNIZATION ADMIN EACH ADD: CPT | Performed by: FAMILY MEDICINE

## 2020-10-01 PROCEDURE — G8754 DIAS BP LESS 90: HCPCS | Performed by: FAMILY MEDICINE

## 2020-10-01 PROCEDURE — G8419 CALC BMI OUT NRM PARAM NOF/U: HCPCS | Performed by: FAMILY MEDICINE

## 2020-10-01 PROCEDURE — 90732 PPSV23 VACC 2 YRS+ SUBQ/IM: CPT | Performed by: FAMILY MEDICINE

## 2020-10-01 PROCEDURE — 90471 IMMUNIZATION ADMIN: CPT | Performed by: FAMILY MEDICINE

## 2020-10-01 PROCEDURE — 90694 VACC AIIV4 NO PRSRV 0.5ML IM: CPT | Performed by: FAMILY MEDICINE

## 2020-10-01 PROCEDURE — G8510 SCR DEP NEG, NO PLAN REQD: HCPCS | Performed by: FAMILY MEDICINE

## 2020-10-01 PROCEDURE — 3017F COLORECTAL CA SCREEN DOC REV: CPT | Performed by: FAMILY MEDICINE

## 2020-10-01 PROCEDURE — 1101F PT FALLS ASSESS-DOCD LE1/YR: CPT | Performed by: FAMILY MEDICINE

## 2020-10-01 PROCEDURE — 99397 PER PM REEVAL EST PAT 65+ YR: CPT | Performed by: FAMILY MEDICINE

## 2020-10-01 PROCEDURE — G8752 SYS BP LESS 140: HCPCS | Performed by: FAMILY MEDICINE

## 2020-10-01 NOTE — PROGRESS NOTES
Chief Complaint   Patient presents with    Physical    Immunization/Injection     flu vaccine     Subjective:   Vince Cheatham is a 81 y.o. male presenting for his annual checkup. ROS:  Feeling well. No dyspnea or chest pain on exertion. No abdominal pain, change in bowel habits, black or bloody stools. No urinary tract or prostatic symptoms. No neurological complaints. Specific concerns today:   No concerns today. Sees cardiology. No cardiac complaints. Was told he has mild cataracts by his eye doctor. No active issues with jock itch at this time. Current Outpatient Medications   Medication Sig Dispense Refill    metoprolol succinate (TOPROL-XL) 50 mg XL tablet TAKE 1 TABLET BY MOUTH EVERY DAY 90 Tab 3    atorvastatin (Lipitor) 20 mg tablet Take 1 Tab by mouth daily. 90 Tab 3    calcium citrate 200 mg (950 mg) tablet Take 200 mg by mouth daily.  glucosamine sulfate 500 mg tablet Take 500 mg by mouth daily.  multivitamin, tx-iron-ca-min (THERA-M W/ IRON) 9 mg iron-400 mcg tab tablet Take 1 Tab by mouth daily.  aspirin delayed-release 81 mg tablet Take 81 mg by mouth daily.  cyanocobalamin (VITAMIN B-12) 1,000 mcg sublingual tablet Take 1 Tab by mouth daily.  27 Tab 11     No Known Allergies  Past Medical History:   Diagnosis Date    Coronary atherosclerosis of native coronary artery     No angina, H/O stent     H/O colonoscopy at 53yo    patient report - unable to get records    H/O colonoscopy with polypectomy 08/2017    polyp - follow-up 3 years   [address] [place] [address] H/O colonoscopy with polypectomy 08/13/2020    Dr. Ranjit Hampton; polyps x 3 (tubular adenoma x 2 and benign mucosal tag); diverticulosis in the sigmoid colon; repeat colonoscopy due in 3 years    HTN (hypertension)     Other and unspecified hyperlipidemia     On Lipitor    Postsurgical percutaneous transluminal coronary angioplasty status      Past Surgical History:   Procedure Laterality Date    HX CORONARY STENT PLACEMENT  2003    LAD/JENY     No family history on file. Social History     Tobacco Use    Smoking status: Never Smoker    Smokeless tobacco: Never Used   Substance Use Topics    Alcohol use: Yes     Alcohol/week: 0.0 standard drinks     Frequency: Monthly or less     Drinks per session: 1 or 2     Binge frequency: Never     Comment: occasional      Objective:     Blood pressure 126/84, pulse 64, temperature 98.2 °F (36.8 °C), temperature source Oral, resp. rate 14, height 5' 4\" (1.626 m), weight 152 lb (68.9 kg), SpO2 99 %. The patient appears well, alert, oriented x 3, in no distress. ENT normal.  Neck supple. No adenopathy or thyromegaly. MARQUISE. Lungs are clear, good air entry, no wheezes, rhonchi or rales. S1 and S2 normal, no murmurs, regular rate and rhythm. Abdomen is soft without tenderness, guarding, mass or organomegaly. Extremities show no edema. Neurological is normal without focal findings. Psych: normal affect. Mood good. Oriented x 3. Judgement and insight intact. Results for orders placed or performed during the hospital encounter of 09/03/20   CBC WITH AUTOMATED DIFF   Result Value Ref Range    WBC 6.2 4.6 - 13.2 K/uL    RBC 4.71 4.70 - 5.50 M/uL    HGB 13.2 13.0 - 16.0 g/dL    HCT 39.6 36.0 - 48.0 %    MCV 84.1 74.0 - 97.0 FL    MCH 28.0 24.0 - 34.0 PG    MCHC 33.3 31.0 - 37.0 g/dL    RDW 13.2 11.6 - 14.5 %    PLATELET 391 144 - 981 K/uL    MPV 10.0 9.2 - 11.8 FL    NEUTROPHILS 55 40 - 73 %    LYMPHOCYTES 27 21 - 52 %    MONOCYTES 11 (H) 3 - 10 %    EOSINOPHILS 6 (H) 0 - 5 %    BASOPHILS 1 0 - 2 %    ABS. NEUTROPHILS 3.5 1.8 - 8.0 K/UL    ABS. LYMPHOCYTES 1.7 0.9 - 3.6 K/UL    ABS. MONOCYTES 0.7 0.05 - 1.2 K/UL    ABS. EOSINOPHILS 0.4 0.0 - 0.4 K/UL    ABS.  BASOPHILS 0.0 0.0 - 0.1 K/UL    DF AUTOMATED     METABOLIC PANEL, COMPREHENSIVE   Result Value Ref Range    Sodium 142 136 - 145 mmol/L    Potassium 4.9 3.5 - 5.5 mmol/L    Chloride 110 100 - 111 mmol/L    CO2 30 21 - 32 mmol/L Anion gap 2 (L) 3.0 - 18 mmol/L    Glucose 87 74 - 99 mg/dL    BUN 19 (H) 7.0 - 18 MG/DL    Creatinine 1.14 0.6 - 1.3 MG/DL    BUN/Creatinine ratio 17 12 - 20      GFR est AA >60 >60 ml/min/1.73m2    GFR est non-AA >60 >60 ml/min/1.73m2    Calcium 8.8 8.5 - 10.1 MG/DL    Bilirubin, total 0.5 0.2 - 1.0 MG/DL    ALT (SGPT) 12 (L) 16 - 61 U/L    AST (SGOT) 17 10 - 38 U/L    Alk. phosphatase 82 45 - 117 U/L    Protein, total 7.1 6.4 - 8.2 g/dL    Albumin 3.5 3.4 - 5.0 g/dL    Globulin 3.6 2.0 - 4.0 g/dL    A-G Ratio 1.0 0.8 - 1.7     LIPID PANEL   Result Value Ref Range    LIPID PROFILE          Cholesterol, total 137 <200 MG/DL    Triglyceride 116 <150 MG/DL    HDL Cholesterol 44 40 - 60 MG/DL    LDL, calculated 69.8 0 - 100 MG/DL    VLDL, calculated 23.2 MG/DL    CHOL/HDL Ratio 3.1 0 - 5.0     PSA SCREENING (SCREENING)   Result Value Ref Range    Prostate Specific Ag 1.6 0.0 - 4.0 ng/mL     Assessment/Plan:       ICD-10-CM ICD-9-CM    1. Routine medical exam  Z00.00 V70.0    2. Atherosclerosis of native coronary artery of native heart with angina pectoris ([facility] [[email or web address]]ca 75.)  I25.119 414.01      413.9    3. Essential hypertension  I10 401.9    4. Hyperlipidemia, unspecified hyperlipidemia type  E78.5 272.4    5. Overweight with body mass index (BMI) 25.0-29.9  E66.3 278.02    6. Tinea cruris  B35.6 110.3    7. Encounter for immunization  Z23 V03.89 [place] IMMUNIZ,ADMIN,EACH ADDL      PNEUMOCOCCAL POLYSACCHARIDE VACCINE, 23-VALENT, ADULT OR IMMUNOSUPPRESSED PT DOSE,      FLU (FLUAD QUAD INFLUENZA VACCINE,QUAD,ADJUVANTED)     Age and sex specific counseling. Diet and exercise for BMI>25. Cont lipitor. Refills sent in. Cont regular cardiology follow-up. Reviewed labs. PPSV23 administered. Flu vaccine administered. All chart history elements were reviewed by me at the time of the visit even though marked at time of note closure. Patient understands our medical plan. Patient has provided input and agrees with goals. Alternatives have been explained and offered. All questions answered. The patient is to call if condition worsens or fails to improve.

## 2020-10-01 NOTE — PROGRESS NOTES
1. Have you been to the ER, urgent care clinic since your last visit? Hospitalized since your last visit? No    2. Have you seen or consulted any other health care providers outside of the [address] [place] [address] since your last visit? Include any pap smears or colon screening. Yes Where: [facility]    Physician order obtained. Patient completed adult immunization consent form. Allergies, contraindications and recommendations reviewed with patient. Seasonal influenza vaccine administered IM right deltoid and Pneumovax 23 administered IM left deltoid. Patient tolerated well. Patient remained in office for 15 minutes after injection and no adverse reactions were noted.

## 2020-10-01 NOTE — PATIENT INSTRUCTIONS
Influenza (Flu) Vaccine (Inactivated or Recombinant): What You Need to Know Why get vaccinated? Influenza vaccine can prevent influenza (flu). Flu is a contagious disease that spreads around the [place] every year, usually between October and May. Anyone can get the flu, but it is more dangerous for some people. Infants and young children, people 72years of age and older, pregnant women, and people with certain health conditions or a weakened immune system are at greatest risk of flu complications. Pneumonia, bronchitis, sinus infections and ear infections are examples of flu-related complications. If you have a medical condition, such as heart disease, cancer or diabetes, flu can make it worse. Flu can cause fever and chills, sore throat, muscle aches, fatigue, cough, headache, and runny or stuffy nose. Some people may have vomiting and diarrhea, though this is more common in children than adults. Each year, thousands of people in the [facility] die from flu, and many more are hospitalized. Flu vaccine prevents millions of illnesses and flu-related visits to the doctor each year. Influenza vaccine CDC recommends everyone 10months of age and older get vaccinated every flu season. Children 6 months through 6years of age may need 2 doses during a single flu season. Everyone else needs only 1 dose each flu season. It takes about 2 weeks for protection to develop after vaccination. There are many flu viruses, and they are always changing. Each year a new flu vaccine is made to protect against three or four viruses that are likely to cause disease in the upcoming flu season. Even when the vaccine doesn't exactly match these viruses, it may still provide some protection. Influenza vaccine does not cause flu. Influenza vaccine may be given at the same time as other vaccines. Talk with your health care provider Tell your vaccine provider if the person getting the vaccine: · Has had an allergic reaction after a previous dose of influenza vaccine, or has any severe, life-threatening allergies. · Has ever had Guillain-Barré Syndrome (also called GBS). In some cases, your health care provider may decide to postpone influenza vaccination to a future visit. People with minor illnesses, such as a cold, may be vaccinated. People who are moderately or severely ill should usually wait until they recover before getting influenza vaccine. Your health care provider can give you more information. Risks of a vaccine reaction · Soreness, redness, and swelling where shot is given, fever, muscle aches, and headache can happen after influenza vaccine. · There may be a very small increased risk of Guillain-Barré Syndrome (GBS) after inactivated influenza vaccine (the flu shot). Mittie Arabella children who get the flu shot along with pneumococcal vaccine (PCV13), and/or DTaP vaccine at the same time might be slightly more likely to have a seizure caused by fever. Tell your health care provider if a child who is getting flu vaccine has ever had a seizure. People sometimes faint after medical procedures, including vaccination. Tell your provider if you feel dizzy or have vision changes or ringing in the ears. As with any medicine, there is a very remote chance of a vaccine causing a severe allergic reaction, other serious injury, or death. What if there is a serious problem? An allergic reaction could occur after the vaccinated person leaves the clinic. If you see signs of a severe allergic reaction (hives, swelling of the face and throat, difficulty breathing, a fast heartbeat, dizziness, or weakness), call 9-1-1 and get the person to the nearest hospital. 
For other signs that concern you, call your health care provider. Adverse reactions should be reported to the Vaccine Adverse Event Reporting System (VAERS).  Your health care provider will usually file this report, or you can do it yourself. Visit the VAERS website at www.vaers. [facility].gov or call 0-977.302.9177. VAERS is only for reporting reactions, and VAERS staff do not give medical advice. The National Vaccine Injury Compensation Program 
The National Vaccine Injury Compensation Program (VICP) is a federal program that was created to compensate people who may have been injured by certain vaccines. Visit the VICP website at www.hrsa.gov/vaccinecompensation or call 2-285.139.6695 to learn about the program and about filing a claim. There is a time limit to file a claim for compensation. How can I learn more? · Ask your healthcare provider. · Call your local or state health department. · Contact the Centers for Disease Control and Prevention (CDC): 
? Call 9-837.586.9776 (2-357-JNG-INFO) or 
? Visit CDC's website at www.cdc.gov/flu Vaccine Information Statement (Interim) Inactivated Influenza Vaccine 8/15/2019 
42 LAMIN Guillermo 348ZW-20 Department of Health and [employer] Centers for Disease Control and Prevention Many Vaccine Information Statements are available in Latvian and other languages. See www.immunize.org/vis. Muchas hojas de información sobre vacunas están disponibles en español y en otros idiomas. Visite www.immunize.org/vis. Care instructions adapted under license by [employer] (which disclaims liability or warranty for this information). If you have questions about a medical condition or this instruction, always ask your healthcare professional. [place] [address] any warranty or liability for your use of this information. Pneumococcal Polysaccharide Vaccine: What You Need to Know Why get vaccinated? Pneumococcal polysaccharide vaccine (PPSV23) can prevent pneumococcal disease. Pneumococcal disease refers to any illness caused by pneumococcal bacteria.  These bacteria can cause many types of illnesses, including pneumonia, which is an infection of the lungs. Pneumococcal bacteria are one of the most common causes of pneumonia. Besides pneumonia, pneumococcal bacteria can also cause: 
· Ear infections, 
· Sinus infections · Meningitis (infection of the tissue covering the brain and spinal cord) · Bacteremia (bloodstream infection) Anyone can get pneumococcal disease, but children under 3years of age, people with certain medical conditions, adults 72 years or older, and cigarette smokers are at the highest risk. Most pneumococcal infections are mild. However, some can result in long-term problems, such as brain damage or hearing loss. Meningitis, bacteremia, and pneumonia caused by pneumococcal disease can be fatal. 
PPSV23 
PPSV23 protects against 23 types of bacteria that cause pneumococcal disease. PPSV23 is recommended for: · All adults 72 years or older, · Anyone 2 years or older with certain medical conditions that can lead to an increased risk for pneumococcal disease. Most people need only one dose of PPSV23. A second dose of PPSV23, and another type of pneumococcal vaccine called PCV13, are recommended for certain high-risk groups. Your health care provider can give you more information. People 65 years or older should get a dose of PPSV23 even if they have already gotten one or more doses of the vaccine before they turned 65. Talk with your health care provider Tell your vaccine provider if the person getting the vaccine: 
· Has had an allergic reaction after a previous dose of PPSV23, or has any severe, life-threatening allergies. In some cases, your health care provider may decide to postpone PPSV23 vaccination to a future visit. People with minor illnesses, such as a cold, may be vaccinated. People who are moderately or severely ill should usually wait until they recover before getting PPSV23. Your health care provider can give you more information. Risks of a vaccine reaction · Redness or pain where the shot is given, feeling tired, fever, or muscle aches can happen after PPSV23. People sometimes faint after medical procedures, including vaccination. Tell your provider if you feel dizzy or have vision changes or ringing in the ears. As with any medicine, there is a very remote chance of a vaccine causing a severe allergic reaction, other serious injury, or death. What if there is a serious problem? An allergic reaction could occur after the vaccinated person leaves the clinic. If you see signs of a severe allergic reaction (hives, swelling of the face and throat, difficulty breathing, a fast heartbeat, dizziness, or weakness), call 9-1-1 and get the person to the nearest hospital. 
For other signs that concern you, call your health care provider. Adverse reactions should be reported to the Vaccine Adverse Event Reporting System (VAERS). Your health care provider will usually file this report, or you can do it yourself. Visit the VAERS website at www.vaers. hhs.gov at www.vaers. hhs.gov or call 0-891.295.3034. VAERS is only for reporting reactions, and VAERS staff do not give medical advice. How can I learn more? · Ask your health care provider. · Call your local or state health department. · Contact the Centers for Disease Control and Prevention (CDC): 
? Call 2-348.593.3086 (1-800-CDC-INFO) or 
? Visit CDC's website at www.cdc.gov/vaccines Vaccine Information Statement PPSV23 Vaccine 10/30/2019 Department of [facility] and "[employer] Centers for Disease Control and Prevention Many Vaccine Information Statements are available in Malagasy and other languages. See www.immunize.org/vis. Hojas de información Sobre Vacunas están disponibles en español y en muchos otros idiomas. Visite Tay.si. Care instructions adapted under license by [employer] (which disclaims liability or warranty for this information).  If you have questions about a medical condition or this instruction, always ask your healthcare professional. [place] [address] any warranty or liability for your use of this information.

## 2020-11-19 ENCOUNTER — TELEPHONE (OUTPATIENT)
Dept: FAMILY MEDICINE CLINIC | Age: 70
End: 2020-11-19

## 2020-11-19 NOTE — TELEPHONE ENCOUNTER
Pt called and stated he has pain on the heel of the left leg. Pt has an appt on Dec 1 but he would like an appt on Monday. Please call pt at your earliest convenience.

## 2021-02-05 ENCOUNTER — DOCUMENTATION ONLY (OUTPATIENT)
Dept: FAMILY MEDICINE CLINIC | Age: 71
End: 2021-02-05

## 2021-02-05 ENCOUNTER — IMMUNIZATION (OUTPATIENT)
Dept: FAMILY MEDICINE CLINIC | Age: 71
End: 2021-02-05
Payer: COMMERCIAL

## 2021-02-05 DIAGNOSIS — Z23 ENCOUNTER FOR IMMUNIZATION: Primary | ICD-10-CM

## 2021-02-05 PROCEDURE — 0011A COVID-19, MRNA, LNP-S, PF, 100MCG/0.5ML DOSE(MODERNA): CPT | Performed by: FAMILY MEDICINE

## 2021-02-05 PROCEDURE — 91301 COVID-19, MRNA, LNP-S, PF, 100MCG/0.5ML DOSE(MODERNA): CPT | Performed by: FAMILY MEDICINE

## 2021-02-05 NOTE — PROGRESS NOTES
Chief Complaint   Patient presents with    Immunization/Injection     COVID-19 Vaccine ( first)     Other     Temp: 98.2     Physician order obtained. Covid 19 vaccine consent form completed.  Allergies, contraindications and recommendations reviewed on consent form. Covid 19 vaccine (first dose) administered IM left deltoid.  Patient tolerated well. Low Graves remained in office for 15 minutes after injection and no adverse reactions were noted.        Juancarlos Carolina   Lot: [id number]  Exp: 03-  [address]: [id number]

## 2021-02-15 DIAGNOSIS — E78.5 HYPERLIPIDEMIA, UNSPECIFIED HYPERLIPIDEMIA TYPE: ICD-10-CM

## 2021-02-15 NOTE — TELEPHONE ENCOUNTER
This pharmacy faxed over request for the following prescriptions to be filled:    Medication requested :   Requested Prescriptions     Pending Prescriptions Disp Refills    atorvastatin (Lipitor) 20 mg tablet 90 Tab 3     Sig: Take 1 Tab by mouth daily. PCP: Jennifer Fraser 39. or Print: [facility]  Mail order or Local pharmacy [address]     Scheduled appointment if not seen by current providers in office: LOV 10/1/2020 No f/u up Scheduled at this time.   Due 10/1/2021  Changed pharmacy needs to go to Cardiologist

## 2021-02-16 ENCOUNTER — HOSPITAL ENCOUNTER (EMERGENCY)
Age: 71
Discharge: HOME | End: 2021-02-16
Attending: EMERGENCY MEDICINE | Admitting: EMERGENCY MEDICINE
Payer: COMMERCIAL

## 2021-02-16 ENCOUNTER — APPOINTMENT (OUTPATIENT)
Dept: GENERAL RADIOLOGY | Age: 71
End: 2021-02-16
Attending: EMERGENCY MEDICINE
Payer: COMMERCIAL

## 2021-02-16 ENCOUNTER — APPOINTMENT (OUTPATIENT)
Dept: CT IMAGING | Age: 71
End: 2021-02-16
Attending: EMERGENCY MEDICINE
Payer: COMMERCIAL

## 2021-02-16 VITALS
SYSTOLIC BLOOD PRESSURE: 131 MMHG | DIASTOLIC BLOOD PRESSURE: 87 MMHG | BODY MASS INDEX: 25.78 KG/M2 | RESPIRATION RATE: 20 BRPM | HEART RATE: 83 BPM | WEIGHT: 151 LBS | OXYGEN SATURATION: 97 % | TEMPERATURE: 98.5 F | HEIGHT: 64 IN

## 2021-02-16 DIAGNOSIS — U07.1 PNEUMONIA DUE TO COVID-19 VIRUS: Primary | ICD-10-CM

## 2021-02-16 DIAGNOSIS — J12.82 PNEUMONIA DUE TO COVID-19 VIRUS: Primary | ICD-10-CM

## 2021-02-16 LAB
ANION GAP SERPL CALC-SCNC: 5 MMOL/L (ref 3–18)
BASOPHILS # BLD: 0 K/UL (ref 0–0.1)
BASOPHILS NFR BLD: 0 % (ref 0–2)
BUN SERPL-MCNC: 22 MG/DL (ref 7–18)
BUN/CREAT SERPL: 19 (ref 12–20)
CALCIUM SERPL-MCNC: 8.2 MG/DL (ref 8.5–10.1)
CHLORIDE SERPL-SCNC: 104 MMOL/L (ref 100–111)
CO2 SERPL-SCNC: 28 MMOL/L (ref 21–32)
CREAT SERPL-MCNC: 1.17 MG/DL (ref 0.6–1.3)
CRP SERPL-MCNC: 2.3 MG/DL (ref 0–0.3)
D DIMER PPP FEU-MCNC: 0.52 UG/ML(FEU)
DIFFERENTIAL METHOD BLD: ABNORMAL
EOSINOPHIL # BLD: 0 K/UL (ref 0–0.4)
EOSINOPHIL NFR BLD: 0 % (ref 0–5)
ERYTHROCYTE [DISTWIDTH] IN BLOOD BY AUTOMATED COUNT: 13.5 % (ref 11.6–14.5)
FERRITIN SERPL-MCNC: 112 NG/ML (ref 8–388)
GLUCOSE SERPL-MCNC: 99 MG/DL (ref 74–99)
HCT VFR BLD AUTO: 40 % (ref 36–48)
HGB BLD-MCNC: 13 G/DL (ref 13–16)
LYMPHOCYTES # BLD: 0.7 K/UL (ref 0.9–3.6)
LYMPHOCYTES NFR BLD: 12 % (ref 21–52)
MAGNESIUM SERPL-MCNC: 2.5 MG/DL (ref 1.6–2.6)
MCH RBC QN AUTO: 27.4 PG (ref 24–34)
MCHC RBC AUTO-ENTMCNC: 32.5 G/DL (ref 31–37)
MCV RBC AUTO: 84.2 FL (ref 74–97)
MONOCYTES # BLD: 0.6 K/UL (ref 0.05–1.2)
MONOCYTES NFR BLD: 11 % (ref 3–10)
NEUTS SEG # BLD: 4 K/UL (ref 1.8–8)
NEUTS SEG NFR BLD: 77 % (ref 40–73)
PLATELET # BLD AUTO: 201 K/UL (ref 135–420)
PMV BLD AUTO: 9.9 FL (ref 9.2–11.8)
POTASSIUM SERPL-SCNC: 4.6 MMOL/L (ref 3.5–5.5)
RBC # BLD AUTO: 4.75 M/UL (ref 4.7–5.5)
SODIUM SERPL-SCNC: 137 MMOL/L (ref 136–145)
WBC # BLD AUTO: 5.3 K/UL (ref 4.6–13.2)

## 2021-02-16 PROCEDURE — 74011250636 HC RX REV CODE- 250/636: Performed by: EMERGENCY MEDICINE

## 2021-02-16 PROCEDURE — 85379 FIBRIN DEGRADATION QUANT: CPT

## 2021-02-16 PROCEDURE — 86140 C-REACTIVE PROTEIN: CPT

## 2021-02-16 PROCEDURE — 71250 CT THORAX DX C-: CPT

## 2021-02-16 PROCEDURE — 85025 COMPLETE CBC W/AUTO DIFF WBC: CPT

## 2021-02-16 PROCEDURE — 86141 C-REACTIVE PROTEIN HS: CPT

## 2021-02-16 PROCEDURE — 71045 X-RAY EXAM CHEST 1 VIEW: CPT

## 2021-02-16 PROCEDURE — 80048 BASIC METABOLIC PNL TOTAL CA: CPT

## 2021-02-16 PROCEDURE — 83735 ASSAY OF MAGNESIUM: CPT

## 2021-02-16 PROCEDURE — 82728 ASSAY OF FERRITIN: CPT

## 2021-02-16 PROCEDURE — 99284 EMERGENCY DEPT VISIT MOD MDM: CPT

## 2021-02-16 RX ORDER — SODIUM CHLORIDE 9 MG/ML
500 INJECTION, SOLUTION INTRAVENOUS CONTINUOUS
Status: DISCONTINUED | OUTPATIENT
Start: 2021-02-16 | End: 2021-02-16 | Stop reason: HOSPADM

## 2021-02-16 RX ORDER — DEXAMETHASONE 4 MG/1
6 TABLET ORAL
Status: DISCONTINUED | OUTPATIENT
Start: 2021-02-16 | End: 2021-02-16

## 2021-02-16 RX ORDER — ATORVASTATIN CALCIUM 20 MG/1
20 TABLET, FILM COATED ORAL DAILY
Qty: 90 TAB | Refills: 3 | Status: SHIPPED | OUTPATIENT
Start: 2021-02-16 | End: 2022-02-09

## 2021-02-16 RX ORDER — DEXAMETHASONE 6 MG/1
TABLET ORAL
Qty: 10 TAB | Refills: 0 | Status: SHIPPED | OUTPATIENT
Start: 2021-02-16 | End: 2021-02-23

## 2021-02-16 RX ADMIN — SODIUM CHLORIDE 500 ML: 900 INJECTION, SOLUTION INTRAVENOUS at 09:29

## 2021-02-16 NOTE — ED NOTES
I have reviewed discharge instructions with the patient. The patient verbalized understanding. Patient armband removed and shredded.    Current Discharge Medication List      START taking these medications    Details   dexAMETHasone (Decadron) 6 mg tablet Take 1 tablet by mouth twice a day until finished  Qty: 10 Tab, Refills: 0

## 2021-02-16 NOTE — ED PROVIDER NOTES
HPI patient is here for evaluation of shortness of breath and COVID-19 positive. He has had his first COVID-19 vaccine shot. Several days ago he tested positive for COVID-19 after receiving the vaccination. Today he reported feeling shortness of breath chest congestion and occasional cough. Corner family members his at home pulse oximetry was in the low 90 range around 92%. I was contacted by his family and he was referred to our facility for further evaluation. Patient says he has had a fever couple days ago but it was short-lived. No further specifics given at this time. Past Medical History:   Diagnosis Date    Coronary atherosclerosis of native coronary artery     No angina, H/O stent     H/O colonoscopy at 51yo    patient report - unable to get records    H/O colonoscopy with polypectomy 08/2017    polyp - follow-up 3 years   [facility] H/O colonoscopy with polypectomy 08/13/2020    Dr. Blackman Ao; polyps x 3 (tubular adenoma x 2 and benign mucosal tag); diverticulosis in the sigmoid colon; repeat colonoscopy due in 3 years    HTN (hypertension)     Other and unspecified hyperlipidemia     On Lipitor    Postsurgical percutaneous transluminal coronary angioplasty status        Past Surgical History:   Procedure Laterality Date    HX CORONARY STENT PLACEMENT  2003    LAD/JENY         History reviewed. No pertinent family history.     Social History     Socioeconomic History    Marital status:      Spouse name: Not on file    Number of children: Not on file    Years of education: Not on file    Highest education level: Not on file   Occupational History    Occupation: shop owner / [employer] resource strain: Not on file    Food insecurity     Worry: Not on file     Inability: Not on file   Viyet needs     Medical: Not on file     Non-medical: Not on file   Tobacco Use    Smoking status: Never Smoker    Smokeless tobacco: Never Used   Substance and Sexual Activity    Alcohol use: Yes     Alcohol/week: 0.0 standard drinks     Frequency: Monthly or less     Drinks per session: 1 or 2     Binge frequency: Never     Comment: occasional    Drug use: No    Sexual activity: Not Currently     Partners: Female   Lifestyle    Physical activity     Days per week: Not on file     Minutes per session: Not on file    Stress: Not on file   Relationships    Social connections     Talks on phone: Not on file     Gets together: Not on file     Attends Worship service: Not on file     Active member of club or organization: Not on file     Attends meetings of clubs or organizations: Not on file     Relationship status: Not on file    Intimate partner violence     Fear of current or ex partner: Not on file     Emotionally abused: Not on file     Physically abused: Not on file     Forced sexual activity: Not on file   Other Topics Concern    Not on file   Social History Narrative    Not on file         ALLERGIES: Patient has no known allergies. Review of Systems   Constitutional: Positive for fever. HENT: Positive for congestion. Respiratory: Positive for cough and shortness of breath. Cardiovascular: Negative. Gastrointestinal: Negative. Genitourinary: Negative. Musculoskeletal: Negative. Skin: Negative. Neurological: Negative. Psychiatric/Behavioral: Negative. Vitals:    02/16/21 0855   BP: 120/79   Pulse: 83   Resp: 20   Temp: 98.5 °F (36.9 °C)   SpO2: 98%   Weight: 68.5 kg (151 lb)   Height: 5' 4\" (1.626 m)            Physical Exam  Vitals signs and nursing note reviewed. Constitutional:       Appearance: He is well-developed. HENT:      Head: Normocephalic and atraumatic. Eyes:      Pupils: Pupils are equal, round, and reactive to light. Neck:      Musculoskeletal: Neck supple. Cardiovascular:      Rate and Rhythm: Normal rate and regular rhythm.    Pulmonary:      Effort: Pulmonary effort is normal.      Breath sounds: Normal breath sounds. Abdominal:      Palpations: Abdomen is soft. Musculoskeletal: Normal range of motion. Skin:     General: Skin is warm and dry. Neurological:      Mental Status: He is alert and oriented to person, place, and time.           MDM       Procedures

## 2021-02-16 NOTE — ED NOTES
CT completed. Pt resting on stretcher waiting for results. Has no c/o at this time.  Denies SOB at rest.

## 2021-02-16 NOTE — ED TRIAGE NOTES
Pt sent here by Dr. Mariposa Balbuena for evaluation of SOB. Pt tested positive for COVID one week ago.  Has had his first vaccine

## 2021-02-17 ENCOUNTER — PATIENT OUTREACH (OUTPATIENT)
Dept: CASE MANAGEMENT | Age: 71
End: 2021-02-17

## 2021-02-17 LAB — CRP SERPL HS-MCNC: >9.5 MG/L

## 2021-02-17 NOTE — PROGRESS NOTES
Date/Time:  2/17/2021 11:55 AM   Call within 2 business days of discharge: Yes   Attempted to reach patient by telephone. Left HIPPA compliant message requesting a return call. Will attempt to reach patient again.

## 2021-02-22 ENCOUNTER — PATIENT OUTREACH (OUTPATIENT)
Dept: CASE MANAGEMENT | Age: 71
End: 2021-02-22

## 2021-02-22 NOTE — PROGRESS NOTES
Date/Time:  2/22/2021 11:31 AM   Call within 2 business days of discharge: Yes   2nd attempt to reach patient by telephone. Left HIPPA compliant message requesting a return call. This episode is resolved.

## 2021-02-23 ENCOUNTER — APPOINTMENT (OUTPATIENT)
Dept: GENERAL RADIOLOGY | Age: 71
End: 2021-02-23
Attending: EMERGENCY MEDICINE
Payer: COMMERCIAL

## 2021-02-23 ENCOUNTER — HOSPITAL ENCOUNTER (EMERGENCY)
Age: 71
Discharge: HOME | End: 2021-02-23
Attending: EMERGENCY MEDICINE
Payer: COMMERCIAL

## 2021-02-23 ENCOUNTER — APPOINTMENT (OUTPATIENT)
Dept: CT IMAGING | Age: 71
End: 2021-02-23
Attending: PHYSICIAN ASSISTANT
Payer: COMMERCIAL

## 2021-02-23 VITALS
WEIGHT: 148 LBS | RESPIRATION RATE: 13 BRPM | HEART RATE: 77 BPM | TEMPERATURE: 97.6 F | DIASTOLIC BLOOD PRESSURE: 89 MMHG | BODY MASS INDEX: 25.27 KG/M2 | SYSTOLIC BLOOD PRESSURE: 120 MMHG | HEIGHT: 64 IN | OXYGEN SATURATION: 99 %

## 2021-02-23 DIAGNOSIS — E86.1 HYPOTENSION DUE TO HYPOVOLEMIA: ICD-10-CM

## 2021-02-23 DIAGNOSIS — N17.9 AKI (ACUTE KIDNEY INJURY) (HCC): ICD-10-CM

## 2021-02-23 DIAGNOSIS — J12.82 PNEUMONIA DUE TO COVID-19 VIRUS: Primary | ICD-10-CM

## 2021-02-23 DIAGNOSIS — U07.1 PNEUMONIA DUE TO COVID-19 VIRUS: Primary | ICD-10-CM

## 2021-02-23 DIAGNOSIS — E86.0 DEHYDRATION: ICD-10-CM

## 2021-02-23 DIAGNOSIS — I95.89 HYPOTENSION DUE TO HYPOVOLEMIA: ICD-10-CM

## 2021-02-23 LAB
ALBUMIN SERPL-MCNC: 2.6 G/DL (ref 3.4–5)
ALBUMIN/GLOB SERPL: 0.7 {RATIO} (ref 0.8–1.7)
ALP SERPL-CCNC: 67 U/L (ref 45–117)
ALT SERPL-CCNC: 12 U/L (ref 16–61)
ANION GAP SERPL CALC-SCNC: 6 MMOL/L (ref 3–18)
AST SERPL-CCNC: 15 U/L (ref 10–38)
ATRIAL RATE: 72 BPM
BASOPHILS # BLD: 0 K/UL (ref 0–0.1)
BASOPHILS NFR BLD: 0 % (ref 0–2)
BILIRUB SERPL-MCNC: 0.4 MG/DL (ref 0.2–1)
BUN SERPL-MCNC: 41 MG/DL (ref 7–18)
BUN/CREAT SERPL: 30 (ref 12–20)
CALCIUM SERPL-MCNC: 8.3 MG/DL (ref 8.5–10.1)
CALCULATED P AXIS, ECG09: 33 DEGREES
CALCULATED R AXIS, ECG10: -15 DEGREES
CALCULATED T AXIS, ECG11: 45 DEGREES
CHLORIDE SERPL-SCNC: 103 MMOL/L (ref 100–111)
CK MB CFR SERPL CALC: ABNORMAL % (ref 0–4)
CK MB SERPL-MCNC: <1 NG/ML (ref 5–25)
CK SERPL-CCNC: 25 U/L (ref 39–308)
CO2 SERPL-SCNC: 26 MMOL/L (ref 21–32)
CREAT SERPL-MCNC: 1.38 MG/DL (ref 0.6–1.3)
D DIMER PPP FEU-MCNC: 1.36 UG/ML(FEU)
DIAGNOSIS, 93000: NORMAL
DIFFERENTIAL METHOD BLD: ABNORMAL
EOSINOPHIL # BLD: 0.1 K/UL (ref 0–0.4)
EOSINOPHIL NFR BLD: 1 % (ref 0–5)
ERYTHROCYTE [DISTWIDTH] IN BLOOD BY AUTOMATED COUNT: 13.2 % (ref 11.6–14.5)
GLOBULIN SER CALC-MCNC: 3.7 G/DL (ref 2–4)
GLUCOSE SERPL-MCNC: 123 MG/DL (ref 74–99)
HCT VFR BLD AUTO: 40.3 % (ref 36–48)
HGB BLD-MCNC: 13.3 G/DL (ref 13–16)
LYMPHOCYTES # BLD: 1.7 K/UL (ref 0.9–3.6)
LYMPHOCYTES NFR BLD: 16 % (ref 21–52)
MCH RBC QN AUTO: 27.3 PG (ref 24–34)
MCHC RBC AUTO-ENTMCNC: 33 G/DL (ref 31–37)
MCV RBC AUTO: 82.6 FL (ref 74–97)
MONOCYTES # BLD: 1.4 K/UL (ref 0.05–1.2)
MONOCYTES NFR BLD: 14 % (ref 3–10)
NEUTS SEG # BLD: 7.1 K/UL (ref 1.8–8)
NEUTS SEG NFR BLD: 69 % (ref 40–73)
P-R INTERVAL, ECG05: 138 MS
PLATELET # BLD AUTO: 371 K/UL (ref 135–420)
PMV BLD AUTO: 9.8 FL (ref 9.2–11.8)
POTASSIUM SERPL-SCNC: 3.8 MMOL/L (ref 3.5–5.5)
PROT SERPL-MCNC: 6.3 G/DL (ref 6.4–8.2)
Q-T INTERVAL, ECG07: 364 MS
QRS DURATION, ECG06: 80 MS
QTC CALCULATION (BEZET), ECG08: 398 MS
RBC # BLD AUTO: 4.88 M/UL (ref 4.7–5.5)
SODIUM SERPL-SCNC: 135 MMOL/L (ref 136–145)
TROPONIN I SERPL-MCNC: <0.02 NG/ML (ref 0–0.04)
VENTRICULAR RATE, ECG03: 72 BPM
WBC # BLD AUTO: 10.2 K/UL (ref 4.6–13.2)

## 2021-02-23 PROCEDURE — 85379 FIBRIN DEGRADATION QUANT: CPT

## 2021-02-23 PROCEDURE — 84484 ASSAY OF TROPONIN QUANT: CPT

## 2021-02-23 PROCEDURE — 74011000636 HC RX REV CODE- 636: Performed by: EMERGENCY MEDICINE

## 2021-02-23 PROCEDURE — 71045 X-RAY EXAM CHEST 1 VIEW: CPT

## 2021-02-23 PROCEDURE — 85025 COMPLETE CBC W/AUTO DIFF WBC: CPT

## 2021-02-23 PROCEDURE — 96361 HYDRATE IV INFUSION ADD-ON: CPT

## 2021-02-23 PROCEDURE — 99284 EMERGENCY DEPT VISIT MOD MDM: CPT

## 2021-02-23 PROCEDURE — 74011250636 HC RX REV CODE- 250/636: Performed by: PHYSICIAN ASSISTANT

## 2021-02-23 PROCEDURE — 71275 CT ANGIOGRAPHY CHEST: CPT

## 2021-02-23 PROCEDURE — 96374 THER/PROPH/DIAG INJ IV PUSH: CPT

## 2021-02-23 PROCEDURE — 93005 ELECTROCARDIOGRAM TRACING: CPT

## 2021-02-23 PROCEDURE — 80053 COMPREHEN METABOLIC PANEL: CPT

## 2021-02-23 RX ORDER — DEXAMETHASONE SODIUM PHOSPHATE 4 MG/ML
10 INJECTION, SOLUTION INTRA-ARTICULAR; INTRALESIONAL; INTRAMUSCULAR; INTRAVENOUS; SOFT TISSUE
Status: COMPLETED | OUTPATIENT
Start: 2021-02-23 | End: 2021-02-23

## 2021-02-23 RX ADMIN — SODIUM CHLORIDE 1000 ML: 900 INJECTION, SOLUTION INTRAVENOUS at 14:46

## 2021-02-23 RX ADMIN — IOPAMIDOL 80 ML: 755 INJECTION, SOLUTION INTRAVENOUS at 14:49

## 2021-02-23 RX ADMIN — DEXAMETHASONE SODIUM PHOSPHATE 10 MG: 4 INJECTION, SOLUTION INTRA-ARTICULAR; INTRALESIONAL; INTRAMUSCULAR; INTRAVENOUS; SOFT TISSUE at 17:55

## 2021-02-23 RX ADMIN — SODIUM CHLORIDE 1000 ML: 900 INJECTION, SOLUTION INTRAVENOUS at 12:24

## 2021-02-23 NOTE — ED TRIAGE NOTES
Patient advised to report to ER by cardiologist, DEION Manuel, for evaluation of possible cardiac issues related to blood pressure fluctuations. Patient denies shortness of breath or chest pain. Patient is positive for Covid.

## 2021-02-23 NOTE — ED NOTES
Patient states need to urinate. Patient given urinal.  IVF completed. Denies other needs or concerns. Appears in no apparent distress.

## 2021-02-23 NOTE — ED PROVIDER NOTES
[address] EMERGENCY DEPT    Date: 2/23/2021  Patient Name: Martín Asif    History of Presenting Illness     Chief Complaint   Patient presents with    Heart Problem    Positive For Covid-19     78 y.o. male with a past medical history of CAD, Hypertension, and Hyperlipidemia presents the ED from Dr. Homero Peralta office for c/o hypotension. Patient was diagnosed with Covid pneumonia on 2/16. He states he feels a little rundown at this point, but is otherwise well. Patient states he has been eating and drinking normally today, but the few days prior he had not. Denies any fever, chills, chest pain, SOB, abd pain, n/v/d, or other symptoms at this time. Patient denies any other associated signs or symptoms. Patient denies any other complaints. Nursing notes regarding the HPI and triage nursing notes were reviewed. Prior medical records were reviewed. Current Facility-Administered Medications   Medication Dose Route Frequency Provider Last Rate Last Admin    dexamethasone (DECADRON) 4 mg/mL injection 10 mg  10 mg IntraVENous NOW YAQUELIN Wiley         Current Outpatient Medications   Medication Sig Dispense Refill    atorvastatin (Lipitor) 20 mg tablet Take 1 Tab by mouth daily. 90 Tab 3    metoprolol succinate (TOPROL-XL) 50 mg XL tablet TAKE 1 TABLET BY MOUTH EVERY DAY 90 Tab 3    cyanocobalamin (VITAMIN B-12) 1,000 mcg sublingual tablet Take 1 Tab by mouth daily. 30 Tab 11    aspirin delayed-release 81 mg tablet Take 81 mg by mouth daily.          Past History     Past Medical History:  Past Medical History:   Diagnosis Date    Coronary atherosclerosis of native coronary artery     No angina, H/O stent     [id number]     H/O colonoscopy at 53yo    patient report - unable to get records    H/O colonoscopy with polypectomy 08/2017    polyp - follow-up 3 years   [facility] [place] H/O colonoscopy with polypectomy 08/13/2020    Dr. Cesario Shaikh; polyps x 3 (tubular adenoma x 2 and benign mucosal tag); diverticulosis in the sigmoid colon; repeat colonoscopy due in 3 years    HTN (hypertension)     Other and unspecified hyperlipidemia     On Lipitor    Postsurgical percutaneous transluminal coronary angioplasty status        Past Surgical History:  Past Surgical History:   Procedure Laterality Date    HX CORONARY STENT PLACEMENT  2003    LAD/JENY       Family History:  History reviewed. No pertinent family history. Social History:  Social History     Tobacco Use    Smoking status: Never Smoker    Smokeless tobacco: Never Used   Substance Use Topics    Alcohol use: Yes     Alcohol/week: 0.0 standard drinks     Frequency: Monthly or less     Drinks per session: 1 or 2     Binge frequency: Never     Comment: occasional    Drug use: No       Allergies:  No Known Allergies    Patient's primary care provider (as noted in EPIC):  Amy Driscoll MD    Review of Systems   Constitutional: + fatigue. Denies malaise, fever, chills. Head:  Denies injury. Face:  Denies injury or pain. Chest:  Denies injury. Cardiac:  Denies chest pain or palpitations. Respiratory:  Denies cough, wheezing, difficulty breathing, shortness of breath. GI/ABD: + decreased PO intake. Denies injury, pain, distention, nausea, vomiting, diarrhea. Extremity/MS:  Denies injury or pain. Neuro:  Denies headache, LOC, dizziness, neurologic symptoms/deficits/paresthesias. Skin: Denies injury, rash, itching or skin changes. All other systems negative as reviewed. Visit Vitals  /83   Pulse 94   Temp 97.6 °F (36.4 °C)   Resp 28   Ht 5' 4\" (1.626 m)   Wt 67.1 kg (148 lb)   SpO2 100%   BMI 25.40 kg/m²       PHYSICAL EXAM:    CONSTITUTIONAL:  Alert, in no apparent distress;  well developed;  well nourished. HEAD:  Normocephalic, atraumatic. EYES:  EOMI. Non-icteric sclera. Normal conjunctiva. ENTM: Mouth: mucous membranes dry. NECK:  Supple  RESPIRATORY:  Chest clear, equal breath sounds, good air movement.   Without wheezes, rhonchi or rales. CARDIOVASCULAR:  Regular rate and rhythm. No murmurs, rubs, or gallops. GI:  Normal bowel sounds, abdomen soft and non-tender. No rebound or guarding. BACK:  Non-tender. NEURO:  Moves all four extremities. Normal motor exam and sensation in all four extremities. PSYCH:  Alert and normal affect. DIFFERENTIAL DIAGNOSES/ MEDICAL DECISION MAKING:   Covid pneumonia, dehydration, hyperglycemia-induced weakness, electrolyte and/or endocrine imbalance, sepsis, cardiac arrhythmia, versus other etiologies or a combination of the above. ED COURSE:      Recent Results (from the past 12 hour(s))   EKG, 12 LEAD, INITIAL    Collection Time: 02/23/21 11:45 AM   Result Value Ref Range    Ventricular Rate 72 BPM    Atrial Rate 72 BPM    P-R Interval 138 ms    QRS Duration 80 ms    Q-T Interval 364 ms    QTC Calculation (Bezet) 398 ms    Calculated P Axis 33 degrees    Calculated R Axis -15 degrees    Calculated T Axis 45 degrees    Diagnosis       Normal sinus rhythm  Normal ECG  No previous ECGs available  Confirmed by Duane Hemp ([id number]) on 2/23/2021 12:12:07 PM     CBC WITH AUTOMATED DIFF    Collection Time: 02/23/21 12:00 PM   Result Value Ref Range    WBC 10.2 4.6 - 13.2 K/uL    RBC 4.88 4.70 - 5.50 M/uL    HGB 13.3 13.0 - 16.0 g/dL    HCT 40.3 36.0 - 48.0 %    MCV 82.6 74.0 - 97.0 FL    MCH 27.3 24.0 - 34.0 PG    MCHC 33.0 31.0 - 37.0 g/dL    RDW 13.2 11.6 - 14.5 %    PLATELET 905 508 - 283 K/uL    MPV 9.8 9.2 - 11.8 FL    NEUTROPHILS 69 40 - 73 %    LYMPHOCYTES 16 (L) 21 - 52 %    MONOCYTES 14 (H) 3 - 10 %    EOSINOPHILS 1 0 - 5 %    BASOPHILS 0 0 - 2 %    ABS. NEUTROPHILS 7.1 1.8 - 8.0 K/UL    ABS. LYMPHOCYTES 1.7 0.9 - 3.6 K/UL    ABS. MONOCYTES 1.4 (H) 0.05 - 1.2 K/UL    ABS. EOSINOPHILS 0.1 0.0 - 0.4 K/UL    ABS.  BASOPHILS 0.0 0.0 - 0.1 K/UL    DF AUTOMATED     METABOLIC PANEL, COMPREHENSIVE    Collection Time: 02/23/21 12:00 PM   Result Value Ref Range    Sodium 135 (L) 136 - 145 mmol/L    Potassium 3.8 3.5 - 5.5 mmol/L    Chloride 103 100 - 111 mmol/L    CO2 26 21 - 32 mmol/L    Anion gap 6 3.0 - 18 mmol/L    Glucose 123 (H) 74 - 99 mg/dL    BUN 41 (H) 7.0 - 18 MG/DL    Creatinine 1.38 (H) 0.6 - 1.3 MG/DL    BUN/Creatinine ratio 30 (H) 12 - 20      GFR est AA >60 >60 ml/min/1.73m2    GFR est non-AA 51 (L) >60 ml/min/1.73m2    Calcium 8.3 (L) 8.5 - 10.1 MG/DL    Bilirubin, total 0.4 0.2 - 1.0 MG/DL    ALT (SGPT) 12 (L) 16 - 61 U/L    AST (SGOT) 15 10 - 38 U/L    Alk. phosphatase 67 45 - 117 U/L    Protein, total 6.3 (L) 6.4 - 8.2 g/dL    Albumin 2.6 (L) 3.4 - 5.0 g/dL    Globulin 3.7 2.0 - 4.0 g/dL    A-G Ratio 0.7 (L) 0.8 - 1.7     CARDIAC PANEL,(CK, CKMB & TROPONIN)    Collection Time: 02/23/21 12:00 PM   Result Value Ref Range    CK - MB <1.0 <3.6 ng/ml    CK-MB Index  0.0 - 4.0 %     CALCULATION NOT PERFORMED WHEN RESULT IS BELOW LINEAR LIMIT    CK 25 (L) 39 - 308 U/L    Troponin-I, QT <0.02 0.0 - 0.045 NG/ML   D DIMER    Collection Time: 02/23/21 12:00 PM   Result Value Ref Range    D DIMER 1.36 (H) <0.46 ug/ml(FEU)      Xr Chest Port    Result Date: 2/23/2021  Portable CXR: HISTORY: Hypotension. Positive Covid test. Comparison 2/16/2021 Cardiac silhouette is normal in size. No vascular congestion. Mild patchy density in the lung bases, slightly less severe than before with better expansion of the lungs. No pleural effusion. Minimal bilateral basilar infiltrate/atelectasis. CTA wet read: no pe  infiltrates seen c/w covid pneumonia    Creatinine has a minimal increase from 2/16. It was 1.17 on that day, 1.38 today. BUN went from 22-41. Patient is dehydrated, given 2 L normal saline. He was initially hypotensive as well secondary to hypovolemia. After the 2 L were given his blood pressure improved to 123/83. Patient reports feeling better. Instructions to drink plenty of water at home. Pt looks well. O2 sat 100%. No sign of respiratory distress.    5:34 PM RR between 14-17. No SOB. Lung sounds CTA bilaterally. Dr. Gregg Bhatt has seen and evaluated the patient as well. Plan for discharge home, f/u with PCP, return for any acute worsening. Diagnosis:   1. Pneumonia due to COVID-19 virus    2. TAMIE (acute kidney injury) ([address]    3. Dehydration    4. Hypotension due to hypovolemia      Disposition: Discharge    Follow-up Information     Follow up With Specialties Details Why Contact Info    Petros Quigley MD Family Medicine In 3 days  [address] [facility] [address] [facility]      [address] [facility] EMERGENCY DEPT Emergency Medicine  If symptoms worsen [address] [address] [phone]          Patient's Medications   Start Taking    No medications on file   Continue Taking    ASPIRIN DELAYED-RELEASE 81 MG TABLET    Take 81 mg by mouth daily. ATORVASTATIN (LIPITOR) 20 MG TABLET    Take 1 Tab by mouth daily. CYANOCOBALAMIN (VITAMIN B-12) 1,000 MCG SUBLINGUAL TABLET    Take 1 Tab by mouth daily. METOPROLOL SUCCINATE (TOPROL-XL) 50 MG XL TABLET    TAKE 1 TABLET BY MOUTH EVERY DAY   These Medications have changed    No medications on file   Stop Taking    DEXAMETHASONE (DECADRON) 6 MG TABLET    Take 1 tablet by mouth twice a day until finished    GLUCOSAMINE SULFATE 500 MG TABLET    Take 500 mg by mouth daily. MULTIVITAMIN, TX-IRON-CA-MIN (THERA-M W/ IRON) 9 MG IRON-400 MCG TAB TABLET    Take 1 Tab by mouth daily.      YAQUELIN Chambers

## 2021-02-23 NOTE — ED NOTES
Shirley Abad is a 78 y.o. male that was discharged in good condition. The patients diagnosis, condition and treatment were explained to  patient and aftercare instructions were given. The patient verbalized understanding. Patient armband removed and shredded.

## 2021-02-24 ENCOUNTER — PATIENT OUTREACH (OUTPATIENT)
Dept: CASE MANAGEMENT | Age: 71
End: 2021-02-24

## 2021-02-24 NOTE — PROGRESS NOTES
Unable to reach    Attempt to reach the patient by telephone. Left message with information to return call. Will re-attempt to reach the patient at a later time.

## 2021-02-25 ENCOUNTER — PATIENT OUTREACH (OUTPATIENT)
Dept: CASE MANAGEMENT | Age: 71
End: 2021-02-25

## 2021-02-25 NOTE — PROGRESS NOTES
Unable to reach (2nd attempt)    Attempted to reach the patient by telephone. Left message with information to return call. Episode will be resolved. No further follow up will be required at this time.

## 2021-03-04 ENCOUNTER — TELEPHONE (OUTPATIENT)
Dept: FAMILY MEDICINE CLINIC | Age: 71
End: 2021-03-04

## 2021-03-05 ENCOUNTER — DOCUMENTATION ONLY (OUTPATIENT)
Dept: FAMILY MEDICINE CLINIC | Age: 71
End: 2021-03-05

## 2021-03-05 ENCOUNTER — IMMUNIZATION (OUTPATIENT)
Dept: FAMILY MEDICINE CLINIC | Age: 71
End: 2021-03-05
Payer: COMMERCIAL

## 2021-03-05 DIAGNOSIS — Z23 ENCOUNTER FOR IMMUNIZATION: Primary | ICD-10-CM

## 2021-03-05 PROCEDURE — 0012A COVID-19, MRNA, LNP-S, PF, 100MCG/0.5ML DOSE(MODERNA): CPT | Performed by: INTERNAL MEDICINE

## 2021-03-05 PROCEDURE — 91301 COVID-19, MRNA, LNP-S, PF, 100MCG/0.5ML DOSE(MODERNA): CPT | Performed by: INTERNAL MEDICINE

## 2021-03-05 PROCEDURE — 90471 IMMUNIZATION ADMIN: CPT | Performed by: FAMILY MEDICINE

## 2021-03-05 NOTE — PROGRESS NOTES
Chief Complaint   Patient presents with    Immunization/Injection     Moderna COVID-19 Vaccine     Other     Temp: 97.1         Physician order obtained. Patient completed adult immunization consent form. Allergies, contraindications and recommendations reviewed with patient. 2nd Moderna / COVID-19  vaccine administered IM left deltoid. Patient tolerated well. Patient remained in office for 15 minutes after injection and no adverse reactions were noted.     Lot # [id number]  [address] # [id number]  Exp Date: 04-

## 2021-03-17 ENCOUNTER — VIRTUAL VISIT (OUTPATIENT)
Dept: CARDIOLOGY CLINIC | Age: 71
End: 2021-03-17
Payer: MEDICARE

## 2021-03-17 DIAGNOSIS — I25.119 ATHEROSCLEROSIS OF NATIVE CORONARY ARTERY OF NATIVE HEART WITH ANGINA PECTORIS (HCC): Primary | ICD-10-CM

## 2021-03-17 DIAGNOSIS — I10 ESSENTIAL HYPERTENSION: ICD-10-CM

## 2021-03-17 DIAGNOSIS — Z98.61 POSTSURGICAL PERCUTANEOUS TRANSLUMINAL CORONARY ANGIOPLASTY STATUS: ICD-10-CM

## 2021-03-17 DIAGNOSIS — U07.1 COVID-19: ICD-10-CM

## 2021-03-17 DIAGNOSIS — E78.5 HYPERLIPIDEMIA, UNSPECIFIED HYPERLIPIDEMIA TYPE: ICD-10-CM

## 2021-03-17 PROCEDURE — 3017F COLORECTAL CA SCREEN DOC REV: CPT | Performed by: INTERNAL MEDICINE

## 2021-03-17 PROCEDURE — G8432 DEP SCR NOT DOC, RNG: HCPCS | Performed by: INTERNAL MEDICINE

## 2021-03-17 PROCEDURE — 1101F PT FALLS ASSESS-DOCD LE1/YR: CPT | Performed by: INTERNAL MEDICINE

## 2021-03-17 PROCEDURE — G8756 NO BP MEASURE DOC: HCPCS | Performed by: INTERNAL MEDICINE

## 2021-03-17 PROCEDURE — 99214 OFFICE O/P EST MOD 30 MIN: CPT | Performed by: INTERNAL MEDICINE

## 2021-03-17 PROCEDURE — G8428 CUR MEDS NOT DOCUMENT: HCPCS | Performed by: INTERNAL MEDICINE

## 2021-03-17 NOTE — PROGRESS NOTES
Consent: Roddy Ortiz, who was seen by synchronous (real-time) audio-video technology, and/or his healthcare decision maker, is aware that this patient-initiated, Telehealth encounter on 3/17/2021 is a billable service, with coverage as determined by his insurance carrier. He is aware that he may receive a bill and has provided verbal consent to proceed: Yes. Subjective:   Roddy Ortiz is a 78 y.o. male who was seen for Hypertension (post covid )    3/2021  Patient seen today for follow-up he has history of coronary artery disease. He had COVID-19 in February 2021. He is initial symptom included fever sluggishness or shortness of breath and he had Covid pneumonia. He is recovering well. Denies any chest pain shortness of breath. Oxygen saturations are normal blood pressure is controlled  Patient had hypotension after Covid and has not required any antihypertensive therapy since then. For about 6-week he has not taken any medication. Still on aspirin and Lipitor  No family history on file. Past Surgical History:   Procedure Laterality Date    HX CORONARY STENT PLACEMENT  2003    LAD/JENY     No Known Allergies    Current Outpatient Medications:     atorvastatin (Lipitor) 20 mg tablet, Take 1 Tab by mouth daily. , Disp: 90 Tab, Rfl: 3    metoprolol succinate (TOPROL-XL) 50 mg XL tablet, TAKE 1 TABLET BY MOUTH EVERY DAY, Disp: 90 Tab, Rfl: 3    cyanocobalamin (VITAMIN B-12) 1,000 mcg sublingual tablet, Take 1 Tab by mouth daily. , Disp: 30 Tab, Rfl: 11    aspirin delayed-release 81 mg tablet, Take 81 mg by mouth daily. , Disp: , Rfl:   No Known Allergies      Review of Systems   Review of Systems   Constitutional: Negative for chills and fever. HENT: Negative for nosebleeds. Eyes: Negative for blurred vision and double vision. Respiratory: See HPI   Cardiovascular: See HPI  Gastrointestinal: Negative for abdominal pain, heartburn, nausea and vomiting.    Musculoskeletal: Negative for myalgias. Skin: Negative for rash. Neurological: Negative for dizziness, weakness and headaches. Endo/Heme/Allergies: Does not bruise/bleed easily. Objective: There were no vitals taken for this visit. General: alert, cooperative, no distress   Mental  status: normal mood, behavior, speech, dress, motor activity, and thought processes, able to follow commands   HENT: NCAT   Neck: no visualized mass,No JVD   Resp: no respiratory distress   Neuro: no gross deficits   Skin: no discoloration or Bruise on visible areas   Psychiatric: normal affect, consistent with stated mood, no evidence of hallucinations     Additional exam findings:     Extremities:No edema     Pertinent LAB and reports  I have reviewed available  pertinent notes, labs and reports and included in current evaluation and management of this patient. Assessment & Plan:   Diagnoses and all orders for this visit:    1. Atherosclerosis of native coronary artery of native heart with angina pectoris ([facility] [address]  Comments:  Stable no angina monitor    2. Essential hypertension  Comments:  Stable currently off all medication we will continue to monitor    3. Postsurgical percutaneous transluminal coronary angioplasty status  Comments:  Stable    4. Hyperlipidemia, unspecified hyperlipidemia type  Comments:  Continue treatment. Repeat lab in about 6 months    5. COVID-19  Comments:  Episode in 2/2021. Recovering well. Follow-up echo              3/2021  Recovering from COVID-19. Follow-up echo. Currently not requiring any antihypertensive therapy. Monitor closely  712  We discussed the expected course, resolution and complications of the diagnosis(es) in detail. Medication risks, benefits, costs, interactions, and alternatives were discussed as indicated. I advised him to contact the office if his condition worsens, changes or fails to improve as anticipated. He expressed understanding with the diagnosis(es) and plan.        Tessa Baumann is a 78 y.o. male being evaluated by a video visit encounter for concerns as above. A caregiver was present when appropriate. Due to this being a TeleHealth encounter (During RUU-79 public health emergency), evaluation of the following organ systems was limited: Vitals/Constitutional/EENT/Resp/CV/GI//MS/Neuro/Skin/Heme-Lymph-Imm. Pursuant to the emergency declaration under the [address] [place] [address], [employer] waiver authority and the [employer] Act, this Virtual  Visit was conducted, with patient's (and/or legal guardian's) consent, to reduce the patient's risk of exposure to COVID-19 and provide necessary medical care. Services were provided through a video synchronous discussion virtually to substitute for in-person clinic visit. I was in the office while conducting this encounter.         Faye Mancilla MD

## 2021-03-19 ENCOUNTER — TELEPHONE (OUTPATIENT)
Dept: FAMILY MEDICINE CLINIC | Age: 71
End: 2021-03-19

## 2021-03-19 NOTE — TELEPHONE ENCOUNTER
Spoke with patient. He is requesting Covid testing to ensure that he no longer infected. Patient currently has no symptoms. He was diagnosed 2/23/2021 by [facility].

## 2021-03-19 NOTE — TELEPHONE ENCOUNTER
Pt is requesting an order to get tested for the Covid 19. He states that he would like to make she that it is complete gone and would like to go to the lab during walk-in hours today. Please advise.

## 2021-03-19 NOTE — TELEPHONE ENCOUNTER
LMOV that testing is unnecessary but I am open-minded if he insists like if an employee rule for example.

## 2021-03-31 DIAGNOSIS — U07.1 COVID-19: Primary | ICD-10-CM

## 2021-03-31 DIAGNOSIS — I25.119 ATHEROSCLEROSIS OF NATIVE CORONARY ARTERY OF NATIVE HEART WITH ANGINA PECTORIS (HCC): ICD-10-CM

## 2021-07-30 ENCOUNTER — TELEPHONE (OUTPATIENT)
Dept: FAMILY MEDICINE CLINIC | Age: 71
End: 2021-07-30

## 2021-07-30 DIAGNOSIS — Z12.5 PROSTATE CANCER SCREENING: ICD-10-CM

## 2021-07-30 DIAGNOSIS — E78.5 HYPERLIPIDEMIA, UNSPECIFIED HYPERLIPIDEMIA TYPE: ICD-10-CM

## 2021-07-30 DIAGNOSIS — I10 ESSENTIAL HYPERTENSION: Primary | ICD-10-CM

## 2021-07-30 NOTE — TELEPHONE ENCOUNTER
Pt is having his CPe on 10/4/2021 and would like to have his blood work before his appt. Please advise.

## 2021-08-30 ENCOUNTER — HOSPITAL ENCOUNTER (OUTPATIENT)
Dept: LAB | Age: 71
Discharge: HOME | End: 2021-08-30
Payer: COMMERCIAL

## 2021-08-30 DIAGNOSIS — I10 ESSENTIAL HYPERTENSION: ICD-10-CM

## 2021-08-30 DIAGNOSIS — Z12.5 PROSTATE CANCER SCREENING: ICD-10-CM

## 2021-08-30 DIAGNOSIS — E78.5 HYPERLIPIDEMIA, UNSPECIFIED HYPERLIPIDEMIA TYPE: ICD-10-CM

## 2021-08-30 LAB
ALBUMIN SERPL-MCNC: 3.6 G/DL (ref 3.4–5)
ALBUMIN/GLOB SERPL: 1 {RATIO} (ref 0.8–1.7)
ALP SERPL-CCNC: 93 U/L (ref 45–117)
ALT SERPL-CCNC: 10 U/L (ref 16–61)
ANION GAP SERPL CALC-SCNC: 7 MMOL/L (ref 3–18)
AST SERPL-CCNC: 19 U/L (ref 10–38)
BASOPHILS # BLD: 0.1 K/UL (ref 0–0.1)
BASOPHILS NFR BLD: 1 % (ref 0–2)
BILIRUB SERPL-MCNC: 0.3 MG/DL (ref 0.2–1)
BUN SERPL-MCNC: 23 MG/DL (ref 7–18)
BUN/CREAT SERPL: 21 (ref 12–20)
CALCIUM SERPL-MCNC: 8.9 MG/DL (ref 8.5–10.1)
CHLORIDE SERPL-SCNC: 107 MMOL/L (ref 100–111)
CHOLEST SERPL-MCNC: 241 MG/DL
CO2 SERPL-SCNC: 27 MMOL/L (ref 21–32)
CREAT SERPL-MCNC: 1.09 MG/DL (ref 0.6–1.3)
DIFFERENTIAL METHOD BLD: NORMAL
EOSINOPHIL # BLD: 0.3 K/UL (ref 0–0.4)
EOSINOPHIL NFR BLD: 5 % (ref 0–5)
ERYTHROCYTE [DISTWIDTH] IN BLOOD BY AUTOMATED COUNT: 13.7 % (ref 11.6–14.5)
GLOBULIN SER CALC-MCNC: 3.6 G/DL (ref 2–4)
GLUCOSE SERPL-MCNC: 93 MG/DL (ref 74–99)
HCT VFR BLD AUTO: 42.8 % (ref 36–48)
HDLC SERPL-MCNC: 45 MG/DL (ref 40–60)
HDLC SERPL: 5.4 {RATIO} (ref 0–5)
HGB BLD-MCNC: 13.7 G/DL (ref 13–16)
LDLC SERPL CALC-MCNC: 164.8 MG/DL (ref 0–100)
LIPID PROFILE,FLP: ABNORMAL
LYMPHOCYTES # BLD: 2 K/UL (ref 0.9–3.6)
LYMPHOCYTES NFR BLD: 30 % (ref 21–52)
MCH RBC QN AUTO: 26 PG (ref 24–34)
MCHC RBC AUTO-ENTMCNC: 32 G/DL (ref 31–37)
MCV RBC AUTO: 81.2 FL (ref 78–100)
MONOCYTES # BLD: 0.6 K/UL (ref 0.05–1.2)
MONOCYTES NFR BLD: 9 % (ref 3–10)
NEUTS SEG # BLD: 3.7 K/UL (ref 1.8–8)
NEUTS SEG NFR BLD: 56 % (ref 40–73)
PLATELET # BLD AUTO: 216 K/UL (ref 135–420)
PMV BLD AUTO: 9.9 FL (ref 9.2–11.8)
POTASSIUM SERPL-SCNC: 5.2 MMOL/L (ref 3.5–5.5)
PROT SERPL-MCNC: 7.2 G/DL (ref 6.4–8.2)
PSA SERPL-MCNC: 2.7 NG/ML (ref 0–4)
RBC # BLD AUTO: 5.27 M/UL (ref 4.35–5.65)
SODIUM SERPL-SCNC: 141 MMOL/L (ref 136–145)
TRIGL SERPL-MCNC: 156 MG/DL (ref ?–150)
VLDLC SERPL CALC-MCNC: 31.2 MG/DL
WBC # BLD AUTO: 6.6 K/UL (ref 4.6–13.2)

## 2021-08-30 PROCEDURE — 80061 LIPID PANEL: CPT

## 2021-08-30 PROCEDURE — 85025 COMPLETE CBC W/AUTO DIFF WBC: CPT

## 2021-08-30 PROCEDURE — 80053 COMPREHEN METABOLIC PANEL: CPT

## 2021-08-30 PROCEDURE — 36415 COLL VENOUS BLD VENIPUNCTURE: CPT

## 2021-08-30 PROCEDURE — 84153 ASSAY OF PSA TOTAL: CPT

## 2021-10-04 ENCOUNTER — OFFICE VISIT (OUTPATIENT)
Dept: FAMILY MEDICINE CLINIC | Age: 71
End: 2021-10-04
Payer: COMMERCIAL

## 2021-10-04 ENCOUNTER — OFFICE VISIT (OUTPATIENT)
Dept: FAMILY MEDICINE CLINIC | Age: 71
End: 2021-10-04

## 2021-10-04 VITALS
TEMPERATURE: 98 F | OXYGEN SATURATION: 97 % | RESPIRATION RATE: 14 BRPM | BODY MASS INDEX: 25.44 KG/M2 | HEIGHT: 64 IN | HEART RATE: 58 BPM | DIASTOLIC BLOOD PRESSURE: 65 MMHG | WEIGHT: 149 LBS | SYSTOLIC BLOOD PRESSURE: 119 MMHG

## 2021-10-04 VITALS
HEART RATE: 58 BPM | SYSTOLIC BLOOD PRESSURE: 138 MMHG | HEIGHT: 64 IN | WEIGHT: 148 LBS | BODY MASS INDEX: 25.27 KG/M2 | TEMPERATURE: 98 F | DIASTOLIC BLOOD PRESSURE: 94 MMHG | RESPIRATION RATE: 14 BRPM | OXYGEN SATURATION: 97 %

## 2021-10-04 DIAGNOSIS — I10 ESSENTIAL HYPERTENSION: ICD-10-CM

## 2021-10-04 DIAGNOSIS — E78.5 HYPERLIPIDEMIA, UNSPECIFIED HYPERLIPIDEMIA TYPE: ICD-10-CM

## 2021-10-04 DIAGNOSIS — Z23 ENCOUNTER FOR IMMUNIZATION: ICD-10-CM

## 2021-10-04 DIAGNOSIS — Z00.00 WELCOME TO MEDICARE PREVENTIVE VISIT: Primary | ICD-10-CM

## 2021-10-04 DIAGNOSIS — I25.119 ATHEROSCLEROSIS OF NATIVE CORONARY ARTERY OF NATIVE HEART WITH ANGINA PECTORIS (HCC): ICD-10-CM

## 2021-10-04 PROCEDURE — 90694 VACC AIIV4 NO PRSRV 0.5ML IM: CPT | Performed by: FAMILY MEDICINE

## 2021-10-04 PROCEDURE — G0402 INITIAL PREVENTIVE EXAM: HCPCS | Performed by: FAMILY MEDICINE

## 2021-10-04 PROCEDURE — G8419 CALC BMI OUT NRM PARAM NOF/U: HCPCS | Performed by: FAMILY MEDICINE

## 2021-10-04 PROCEDURE — G8536 NO DOC ELDER MAL SCRN: HCPCS | Performed by: FAMILY MEDICINE

## 2021-10-04 PROCEDURE — G8510 SCR DEP NEG, NO PLAN REQD: HCPCS | Performed by: FAMILY MEDICINE

## 2021-10-04 PROCEDURE — G8427 DOCREV CUR MEDS BY ELIG CLIN: HCPCS | Performed by: FAMILY MEDICINE

## 2021-10-04 NOTE — PATIENT INSTRUCTIONS
Influenza (Flu) Vaccine (Inactivated or Recombinant): What You Need to Know  Why get vaccinated? Influenza vaccine can prevent influenza (flu). Flu is a contagious disease that spreads around the [place] every year, usually between October and May. Anyone can get the flu, but it is more dangerous for some people. Infants and young children, people 72years of age and older, pregnant women, and people with certain health conditions or a weakened immune system are at greatest risk of flu complications. Pneumonia, bronchitis, sinus infections and ear infections are examples of flu-related complications. If you have a medical condition, such as heart disease, cancer or diabetes, flu can make it worse. Flu can cause fever and chills, sore throat, muscle aches, fatigue, cough, headache, and runny or stuffy nose. Some people may have vomiting and diarrhea, though this is more common in children than adults. Each year, thousands of people in the [facility] die from flu, and many more are hospitalized. Flu vaccine prevents millions of illnesses and flu-related visits to the doctor each year. Influenza vaccine  CDC recommends everyone 10months of age and older get vaccinated every flu season. Children 6 months through 6years of age may need 2 doses during a single flu season. Everyone else needs only 1 dose each flu season. It takes about 2 weeks for protection to develop after vaccination. There are many flu viruses, and they are always changing. Each year a new flu vaccine is made to protect against three or four viruses that are likely to cause disease in the upcoming flu season. Even when the vaccine doesn't exactly match these viruses, it may still provide some protection. Influenza vaccine does not cause flu. Influenza vaccine may be given at the same time as other vaccines.   Talk with your health care provider  Tell your vaccine provider if the person getting the vaccine:  · Has had an allergic reaction after a previous dose of influenza vaccine, or has any severe, life-threatening allergies. · Has ever had Guillain-Barré Syndrome (also called GBS). In some cases, your health care provider may decide to postpone influenza vaccination to a future visit. People with minor illnesses, such as a cold, may be vaccinated. People who are moderately or severely ill should usually wait until they recover before getting influenza vaccine. Your health care provider can give you more information. Risks of a vaccine reaction  · Soreness, redness, and swelling where shot is given, fever, muscle aches, and headache can happen after influenza vaccine. · There may be a very small increased risk of Guillain-Barré Syndrome (GBS) after inactivated influenza vaccine (the flu shot). [facility] children who get the flu shot along with pneumococcal vaccine (PCV13), and/or DTaP vaccine at the same time might be slightly more likely to have a seizure caused by fever. Tell your health care provider if a child who is getting flu vaccine has ever had a seizure. People sometimes faint after medical procedures, including vaccination. Tell your provider if you feel dizzy or have vision changes or ringing in the ears. As with any medicine, there is a very remote chance of a vaccine causing a severe allergic reaction, other serious injury, or death. What if there is a serious problem? An allergic reaction could occur after the vaccinated person leaves the clinic. If you see signs of a severe allergic reaction (hives, swelling of the face and throat, difficulty breathing, a fast heartbeat, dizziness, or weakness), call 9-1-1 and get the person to the nearest hospital.  For other signs that concern you, call your health care provider. Adverse reactions should be reported to the Vaccine Adverse Event Reporting System (VAERS). Your health care provider will usually file this report, or you can do it yourself.  Visit the VAERS website at www.vaers. [facility].gov or call 3-426.620.3915. VAERS is only for reporting reactions, and VAERS staff do not give medical advice. The National Vaccine Injury Compensation Program  The National Vaccine Injury Compensation Program (VICP) is a federal program that was created to compensate people who may have been injured by certain vaccines. Visit the VICP website at www.[facility].gov/vaccinecompensation or call 2-832.854.9512 to learn about the program and about filing a claim. There is a time limit to file a claim for compensation. How can I learn more? · Ask your healthcare provider. · Call your local or state health department. · Contact the Centers for Disease Control and Prevention (CDC):  ? Call 5-368.353.6971 (4-331-GWL-INFO) or  ? Visit CDC's website at www.cdc.gov/flu  Vaccine Information Statement (Interim)  Inactivated Influenza Vaccine  8/15/2019  42 LAMIN Wilks 313CM-41  Department of Health and Human Services  Centers for Disease Control and Prevention  Many Vaccine Information Statements are available in [place] and other languages. See www.immunize.org/vis. Muchas hojas de información sobre vacunas están disponibles en español y en otros idiomas. Visite www.immunize.org/vis. Care instructions adapted under license by [employer] (which disclaims liability or warranty for this information). If you have questions about a medical condition or this instruction, always ask your healthcare professional. [place] [address] any warranty or liability for your use of this information. Medicare Wellness Visit, Male    The best way to live healthy is to have a lifestyle where you eat a well-balanced diet, exercise regularly, limit alcohol use, and quit all forms of tobacco/nicotine, if applicable. Regular preventive services are another way to keep healthy.  Preventive services (vaccines, screening tests, monitoring & exams) can help personalize your care plan, which helps you manage your own care. Screening tests can find health problems at the earliest stages, when they are easiest to treat. [facility] follows the current, evidence-based guidelines published by the [facility] States Niranjan Velasquez ([facility] [[facility]]STF) when recommending preventive services for our patients. Because we follow these guidelines, sometimes recommendations change over time as research supports it. (For example, a prostate screening blood test is no longer routinely recommended for men with no symptoms). Of course, you and your doctor may decide to screen more often for some diseases, based on your risk and co-morbidities (chronic disease you are already diagnosed with). Preventive services for you include:  - Medicare offers their members a free annual wellness visit, which is time for you and your primary care provider to discuss and plan for your preventive service needs. Take advantage of this benefit every year!  -All adults over age 72 should receive the recommended pneumonia vaccines. Current USPSTF guidelines recommend a series of two vaccines for the best pneumonia protection.   -All adults should have a flu vaccine yearly and tetanus vaccine every 10 years.  -All adults age 48 and older should receive the shingles vaccines (series of two vaccines). -All adults age 38-68 who are overweight should have a diabetes screening test once every three years.   -Other screening tests & preventive services for persons with diabetes include: an eye exam to screen for diabetic retinopathy, a kidney function test, a foot exam, and stricter control over your cholesterol.   -Cardiovascular screening for adults with routine risk involves an electrocardiogram (ECG) at intervals determined by the provider.   -Colorectal cancer screening should be done for adults age 54-65 with no increased risk factors for colorectal cancer.   There are a number of acceptable methods of screening for this type of cancer. Each test has its own benefits and drawbacks. Discuss with your provider what is most appropriate for you during your annual wellness visit. The different tests include: colonoscopy (considered the best screening method), a fecal occult blood test, a fecal DNA test, and sigmoidoscopy.  -All adults born between [facility] should be screened once for Hepatitis C.  -An Abdominal Aortic Aneurysm (AAA) Screening is recommended for men age 73-68 who has ever smoked in their lifetime. Here is a list of your current Health Maintenance items (your personalized list of preventive services) with a due date:  Health Maintenance Due   Topic Date Due    Shingles Vaccine (1 of 2) Never done        Advance Care Planning: Care Instructions  Your Care Instructions     It can be hard to live with an illness that cannot be cured. But if your health is getting worse, you may want to make decisions about end-of-life care. Planning for the end of your life does not mean that you are giving up. It is a way to make sure that your wishes are met. Clearly stating your wishes can make it easier for your loved ones. Making plans while you are still able may also ease your mind and make your final days less stressful and more meaningful. Follow-up care is a key part of your treatment and safety. Be sure to make and go to all appointments, and call your doctor if you are having problems. It's also a good idea to know your test results and keep a list of the medicines you take. What can you do to plan for the end of life? · You can bring these issues up with your doctor. You do not need to wait until your doctor starts the conversation. You might start with, \"What makes life worth living for me is. Anson Rabago \" And then follow it with, \"I would not be willing to live with . Anson Rabago \" When you complete this sentence it helps your doctor understand your wishes. · Talk openly and honestly with your doctor.  This is the best way to understand the decisions you will need to make as your health changes. Know that you can always change your mind. · Ask your doctor about commonly used life-support measures. These include tube feedings, breathing machines, and fluids given through a vein (IV). Understanding these treatments will help you decide whether you want them. · You may choose to have these life-supporting treatments for a limited time. This allows a trial period to see whether they will help you. You may also decide that you want your doctor to take only certain measures to keep you alive. It may help to think about the big picture, like what makes life worth living for you or what your values and goals are. · Talk to your doctor about how long you are likely to live. Your doctor may be able to give you an idea of what usually happens with your specific illness. · Think about preparing papers that state your wishes. These papers are called advance directives. If you do this early and review them often, there will not be any confusion about what you want. You can change your instructions at any time. Which papers should you prepare? Advance directives are legal papers that tell doctors how you want to be cared for at the end of your life. You do not need a  to write these papers. Ask your doctor or your state health department for information on how to write your advance directives. They may have the forms for each of these types of papers. Make sure your doctor has a copy of these on file, and give a copy to a family member or close friend. · Consider a do-not-resuscitate order (DNR). This order asks that no extra treatments be done if your heart stops or you stop breathing. Extra treatments may include cardiopulmonary resuscitation (CPR), electrical shock to restart your heart, or a machine to breathe for you. If you decide to have a DNR order, ask your doctor to explain and write it.  Place the order in your home where everyone can easily see it. · Consider a living will. A living will explains your wishes about life support and other treatments at the end of your life if you become unable to speak for yourself. Living villegas tell doctors to use or not use treatments that would keep you alive. You must have one or two witnesses or a notary present when you sign this form. A living will may be called something else in your state. · Consider a medical power of . This form allows you to name a person to make decisions about your care if you are not able to. Most people ask a close friend or family member. Talk to this person about the kinds of treatments you want and those that you do not want. Make sure this person understands your wishes. A medical power of  may be called something else in your state. These legal papers are simple to change. Tell your doctor what you want to change, and ask him or her to make a note in your medical file. Give your family updated copies of the papers. Where can you learn more? Go to http://www.ramirez.com/  Enter P184 in the search box to learn more about \"Advance Care Planning: Care Instructions. \"  Current as of: March 17, 2021               Content Version: 13.0  © 2006-2021 Healthwise, [employer]. Care instructions adapted under license by [employer] (which disclaims liability or warranty for this information). If you have questions about a medical condition or this instruction, always ask your healthcare professional. [place] [address] any warranty or liability for your use of this information. Learning About Living Jacqueline Sim  What is a living will? A living will, also called a declaration, is a legal form. It tells your family and your doctor your wishes when you can't speak for yourself. It's used by the health professionals who will treat you as you near the end of your life or if you get seriously hurt or ill.   If you put your wishes in writing, your loved ones and others will know what kind of care you want. They won't need to guess. This can ease your mind and be helpful to others. And you can change or cancel your living will at any time. A living will is not the same as an estate or property will. An estate will explains what you want to happen with your money and property after you die. How do you use it? A living will is used to describe the kinds of treatment or life support you want as you near the end of your life or if you get seriously hurt or ill. Keep these facts in mind about living villegas. · Your living will is used only if you can't speak or make decisions for yourself. Most often, one or more doctors must certify that you can't speak or decide for yourself before your living will takes effect. · If you get better and can speak for yourself again, you can accept or refuse any treatment. It doesn't matter what you said in your living will. · Some states may limit your right to refuse treatment in certain cases. For example, you may need to clearly state in your living will that you don't want artificial hydration and nutrition, such as being fed through a tube. Is a living will a legal document? A living will is a legal document. Each state has its own laws about living villegas. And a living will may be called something else in your state. Here are some things to know about living villegas. · You don't need an  to complete a living will. But legal advice can be helpful if your state's laws are unclear. It can also help if your health history is complicated or your family can't agree on what should be in your living will. · You can change your living will at any time. Some people find that their wishes about end-of-life care change as their health changes. If you make big changes to your living will, complete a new form.   · If you move to another state, make sure that your living will is legal in the state where you now live. In most cases, doctors will respect your wishes even if you have a form from a different state. · You might use a universal form that has been approved by many states. This kind of form can sometimes be filled out and stored online. Your digital copy will then be available wherever you have a connection to the internet. The doctors and nurses who need to treat you can find it right away. · Your state may offer an online registry. This is another place where you can store your living will online. · It's a good idea to get your living will notarized. This means using a person called a 100du.tv to watch two people sign, or witness, your living will. What should you know when you create a living will? Here are some questions to ask yourself as you make your living will:  · Do you know enough about life support methods that might be used? If not, talk to your doctor so you know what might be done if you can't breathe on your own, your heart stops, or you can't swallow. · What things would you still want to be able to do after you receive life-support methods? Would you want to be able to walk? To speak? To eat on your own? To live without the help of machines? · Do you want certain Yarsani practices performed if you become very ill? · If you have a choice, where do you want to be cared for? In your home? At a hospital or nursing home? · If you have a choice at the end of your life, where would you prefer to die? At home? In a hospital or nursing home? Somewhere else? · Would you prefer to be buried or cremated? · Do you want your organs to be donated after you die? What should you do with your living will? · Make sure that your family members and your health care agent have copies of your living will (also called a declaration). · Give your doctor a copy of your living will. Ask him or her to keep it as part of your medical record.  If you have more than one doctor, make sure that each one has a copy. · Put a copy of your living will where it can be easily found. For example, some people may put a copy on their refrigerator door. If you are using a digital copy, be sure your doctor, family members, and health care agent know how to find and access it. Where can you learn more? Go to http://www.gray.com/  Enter K356 in the search box to learn more about \"Learning About Living Perrozaheer. \"  Current as of: March 17, 2021               Content Version: 13.0  © 2112-7000 [employer]. Care instructions adapted under license by [employer] (which disclaims liability or warranty for this information). If you have questions about a medical condition or this instruction, always ask your healthcare professional. [place] [address] any warranty or liability for your use of this information. Advance Directives: Care Instructions  Overview  An advance directive is a legal way to state your wishes at the end of your life. It tells your family and your doctor what to do if you can't say what you want. There are two main types of advance directives. You can change them any time your wishes change. Living will. This form tells your family and your doctor your wishes about life support and other treatment. The form is also called a declaration. Medical power of . This form lets you name a person to make treatment decisions for you when you can't speak for yourself. This person is called a health care agent (health care proxy, health care surrogate). The form is also called a durable power of  for health care. If you do not have an advance directive, decisions about your medical care may be made by a family member, or by a doctor or a  who doesn't know you. It may help to think of an advance directive as a gift to the people who care for you.  If you have one, they won't have to make tough decisions by themselves. Follow-up care is a key part of your treatment and safety. Be sure to make and go to all appointments, and call your doctor if you are having problems. It's also a good idea to know your test results and keep a list of the medicines you take. What should you include in an advance directive? Many states have a unique advance directive form. (It may ask you to address specific issues.) Or you might use a universal form that's approved by many states. If your form doesn't tell you what to address, it may be hard to know what to include in your advance directive. Use the questions below to help you get started. · Who do you want to make decisions about your medical care if you are not able to? · What life-support measures do you want if you have a serious illness that gets worse over time or can't be cured? · What are you most afraid of that might happen? (Maybe you're afraid of having pain, losing your independence, or being kept alive by machines.)  · Where would you prefer to die? (Your home? A hospital? A nursing home?)  · Do you want to donate your organs when you die? · Do you want certain Sabianism practices performed before you die? When should you call for help? Be sure to contact your doctor if you have any questions. Where can you learn more? Go to http://kevin-isabel.info/  Enter R264 in the search box to learn more about \"Advance Directives: Care Instructions. \"  Current as of: March 17, 2021               Content Version: 13.0  © 2006-2021 Healthwise, Inc[employer]. Care instructions adapted under license by [employer] (which disclaims liability or warranty for this information). If you have questions about a medical condition or this instruction, always ask your healthcare professional. [address] any warranty or liability for your use of this information.

## 2021-10-04 NOTE — PROGRESS NOTES
1. \"Have you been to the ER, urgent care clinic since your last visit? Hospitalized since your last visit? \" No    2. \"Have you seen or consulted any other health care providers outside of the [address] [place] [address] since your last visit? \" No     3. For patients aged 39-70: Has the patient had a colonoscopy? Yes, HM satisfied with blue hyperlink       Physician order obtained. Patient completed adult immunization consent form. Allergies, contraindications and recommendations reviewed with patient. High dose easonal influenza vaccine administered IM left deltoid. Patient tolerated well. Patient remained in office for 15 minutes after injection and no adverse reactions were noted.

## 2021-10-04 NOTE — PROGRESS NOTES
Chief Complaint   Patient presents with   [facility] Annual Wellness Visit     3 most recent PHQ Screens 10/4/2021   Little interest or pleasure in doing things Not at all   Feeling down, depressed, irritable, or hopeless Not at all   Total Score PHQ 2 0     Abuse Screening Questionnaire 10/4/2021   Do you ever feel afraid of your partner? N   Are you in a relationship with someone who physically or mentally threatens you? N   Is it safe for you to go home? Y     Fall Risk Assessment, last 12 mths 10/4/2021   Able to walk? Yes   Fall in past 12 months? 0   Do you feel unsteady? 0   Are you worried about falling 0     1. \"Have you been to the ER, urgent care clinic since your last visit? Hospitalized since your last visit? \" No     2. \"Have you seen or consulted any other health care providers outside of the [address] [place] [address] since your last visit? \" No      3. For patients aged 39-70: Has the patient had a colonoscopy?  Yes,  satisfied with blue hyperlink

## 2021-10-04 NOTE — PROGRESS NOTES
Chief Complaint   Patient presents with    Welcome To Medicare    Hypertension    Immunization/Injection     flu vaccine     This is a \"Welcome to [employer]"  Initial Preventive Physical Examination (IPPE) providing Personalized Prevention Plan Services (Performed in the first 12 months of enrollment)    I have reviewed the patient's medical history in detail and updated the computerized patient record. Assessment/Plan   Education and counseling provided:  Are appropriate based on today's review and evaluation    1. Encounter for immunization  -     FLU (FLUAD QUAD INFLUENZA VACCINE,QUAD,ADJUVANTED)  -     ADMIN INFLUENZA VIRUS VAC  2. Welcome to Medicare preventive visit     Age and sex specific counseling. Screening for depression and alcoholism. Advanced directives / end of life planning discussion and packet provided for review at home. Care team updated. Vaccines are up to date with the exception of shingles which he will get at a local pharmacy. Medicare recommended screening and prevention test table is filled out, reviewed, and provided to patient. Screening and prevention is up to date. Patient understands our medical plan. Patient has provided input and agrees with goals. All questions answered. Depression Risk Screen     3 most recent PHQ Screens 10/4/2021   Little interest or pleasure in doing things Not at all   Feeling down, depressed, irritable, or hopeless Not at all   Total Score PHQ 2 0       Alcohol Risk Screen    Do you average more than 1 drink per night or more than 7 drinks a week: No    In the past three months have you have had more than 4 drinks containing alcohol on one occasion: No    Functional Ability and Level of Safety    Diet:   Other than 90% vegetarian. Has cut out alcohol. Limit calories to 4301-0029 kcal / day. Hearing: Hearing is good. Vision Screening:  Vision is good.   Has seen eye doctor recently and has had vision corrected  No exam data present      Activities of Daily Living: The home contains: no safety equipment. Patient does total self care      Ambulation: with no difficulty      Exercise level: moderately active     Fall Risk Screen:  Fall Risk Assessment, last 12 mths 10/4/2021   Able to walk? Yes   Fall in past 12 months? 0   Do you feel unsteady? 0   Are you worried about falling 0      Abuse Screen:  Patient is not abused       Screening EKG   EKG order placed: No, sees cardiology. End of Life Planning   Advanced care planning directives were discussed with the patient and /or family/caregiver. Health Maintenance Due     Health Maintenance Due   Topic Date Due    Shingrix Vaccine Age 49> (1 of 2) Never done       Patient Care Team   Patient Care Team:  Sarina Gallagher MD as PCP - General (Family Medicine)  Sarina Gallagher MD as PCP - [facility] Provider  Meredith Laura MD (Internal Medicine)  Niharika Cardoza MD (Cardiology)  Mary Fernandez MD (Orthopedic Surgery)  Ericka Montgomery MD (Gastroenterology)    History     Past Medical History:   Diagnosis Date    Coronary atherosclerosis of native coronary artery     No angina, H/O stent     COVID-23     H/O colonoscopy at 53yo    patient report - unable to get records    H/O colonoscopy with polypectomy 08/2017    polyp - follow-up 3 years   Steven Conrad H/O colonoscopy with polypectomy 08/13/2020    Dr. Luis Peterson; polyps x 3 (tubular adenoma x 2 and benign mucosal tag); diverticulosis in the sigmoid colon; repeat colonoscopy due in 3 years    HTN (hypertension)     Other and unspecified hyperlipidemia     On Lipitor    Postsurgical percutaneous transluminal coronary angioplasty status       Past Surgical History:   Procedure Laterality Date    HX CORONARY STENT PLACEMENT  2003    LAD/JENY     Current Outpatient Medications   Medication Sig Dispense Refill    atorvastatin (Lipitor) 20 mg tablet Take 1 Tab by mouth daily.  90 Tab 3    metoprolol succinate (TOPROL-XL) 50 mg XL tablet TAKE 1 TABLET BY MOUTH EVERY DAY 90 Tab 3    cyanocobalamin (VITAMIN B-12) 1,000 mcg sublingual tablet Take 1 Tab by mouth daily. 30 Tab 11    aspirin delayed-release 81 mg tablet Take 81 mg by mouth daily. No Known Allergies    History reviewed. No pertinent family history. Social History     Tobacco Use    Smoking status: Never Smoker    Smokeless tobacco: Never Used   Substance Use Topics    Alcohol use: Yes     Alcohol/week: 0.0 standard drinks     Comment: occasional     Results for orders placed or performed during the hospital encounter of 08/30/21   CBC WITH AUTOMATED DIFF   Result Value Ref Range    WBC 6.6 4.6 - 13.2 K/uL    RBC 5.27 4.35 - 5.65 M/uL    HGB 13.7 13.0 - 16.0 g/dL    HCT 42.8 36.0 - 48.0 %    MCV 81.2 78.0 - 100.0 FL    MCH 26.0 24.0 - 34.0 PG    MCHC 32.0 31.0 - 37.0 g/dL    RDW 13.7 11.6 - 14.5 %    PLATELET 434 586 - 417 K/uL    MPV 9.9 9.2 - 11.8 FL    NEUTROPHILS 56 40 - 73 %    LYMPHOCYTES 30 21 - 52 %    MONOCYTES 9 3 - 10 %    EOSINOPHILS 5 0 - 5 %    BASOPHILS 1 0 - 2 %    ABS. NEUTROPHILS 3.7 1.8 - 8.0 K/UL    ABS. LYMPHOCYTES 2.0 0.9 - 3.6 K/UL    ABS. MONOCYTES 0.6 0.05 - 1.2 K/UL    ABS. EOSINOPHILS 0.3 0.0 - 0.4 K/UL    ABS. BASOPHILS 0.1 0.0 - 0.1 K/UL    DF AUTOMATED     METABOLIC PANEL, COMPREHENSIVE   Result Value Ref Range    Sodium 141 136 - 145 mmol/L    Potassium 5.2 3.5 - 5.5 mmol/L    Chloride 107 100 - 111 mmol/L    CO2 27 21 - 32 mmol/L    Anion gap 7 3.0 - 18 mmol/L    Glucose 93 74 - 99 mg/dL    BUN 23 (H) 7.0 - 18 MG/DL    Creatinine 1.09 0.6 - 1.3 MG/DL    BUN/Creatinine ratio 21 (H) 12 - 20      GFR est AA >60 >60 ml/min/1.73m2    GFR est non-AA >60 >60 ml/min/1.73m2    Calcium 8.9 8.5 - 10.1 MG/DL    Bilirubin, total 0.3 0.2 - 1.0 MG/DL    ALT (SGPT) 10 (L) 16 - 61 U/L    AST (SGOT) 19 10 - 38 U/L    Alk.  phosphatase 93 45 - 117 U/L    Protein, total 7.2 6.4 - 8.2 g/dL    Albumin 3.6 3.4 - 5.0 g/dL    Globulin 3.6 2.0 - 4.0 g/dL    A-G Ratio 1.0 0.8 - 1.7     LIPID PANEL   Result Value Ref Range    LIPID PROFILE          Cholesterol, total 241 (H) <200 MG/DL    Triglyceride 156 (H) <150 MG/DL    HDL Cholesterol 45 40 - 60 MG/DL    LDL, calculated 164.8 (H) 0 - 100 MG/DL    VLDL, calculated 31.2 MG/DL    CHOL/HDL Ratio 5.4 (H) 0 - 5.0     PSA SCREENING (SCREENING)   Result Value Ref Range    Prostate Specific Ag 2.7 0.0 - 4.0 ng/mL     Bonny Watson MD

## 2021-11-29 ENCOUNTER — TELEPHONE (OUTPATIENT)
Dept: CARDIOLOGY CLINIC | Age: 71
End: 2021-11-29

## 2021-11-29 DIAGNOSIS — I25.119 ATHEROSCLEROSIS OF NATIVE CORONARY ARTERY OF NATIVE HEART WITH ANGINA PECTORIS (HCC): Primary | ICD-10-CM

## 2021-11-29 DIAGNOSIS — U07.1 COVID-19: ICD-10-CM

## 2021-11-29 NOTE — TELEPHONE ENCOUNTER
Patient is significant increasing shortness of breath. Desires worsening symptom on activity like climbing 1 flight of stairs or doing walking outside. Concern about ischemia and LV dysfunction.   We will set up for echo and nuclear stress test.  Also history of Covid with Covid pneumonia in past rule out LV dysfunction

## 2021-11-30 ENCOUNTER — OFFICE VISIT (OUTPATIENT)
Dept: CARDIOLOGY CLINIC | Age: 71
End: 2021-11-30

## 2021-11-30 VITALS
SYSTOLIC BLOOD PRESSURE: 135 MMHG | DIASTOLIC BLOOD PRESSURE: 80 MMHG | HEIGHT: 64 IN | HEART RATE: 68 BPM | BODY MASS INDEX: 25.27 KG/M2 | WEIGHT: 148 LBS

## 2021-11-30 DIAGNOSIS — R06.02 SHORTNESS OF BREATH: ICD-10-CM

## 2021-11-30 DIAGNOSIS — I10 ESSENTIAL HYPERTENSION: ICD-10-CM

## 2021-11-30 DIAGNOSIS — I25.119 ATHEROSCLEROSIS OF NATIVE CORONARY ARTERY OF NATIVE HEART WITH ANGINA PECTORIS (HCC): Primary | ICD-10-CM

## 2021-11-30 DIAGNOSIS — I47.1 SVT (SUPRAVENTRICULAR TACHYCARDIA) (HCC): ICD-10-CM

## 2021-11-30 DIAGNOSIS — Z98.61 POSTSURGICAL PERCUTANEOUS TRANSLUMINAL CORONARY ANGIOPLASTY STATUS: ICD-10-CM

## 2021-11-30 DIAGNOSIS — E78.5 HYPERLIPIDEMIA, UNSPECIFIED HYPERLIPIDEMIA TYPE: ICD-10-CM

## 2021-11-30 DIAGNOSIS — U07.1 COVID-19: ICD-10-CM

## 2021-11-30 PROCEDURE — G8432 DEP SCR NOT DOC, RNG: HCPCS | Performed by: INTERNAL MEDICINE

## 2021-11-30 PROCEDURE — G8754 DIAS BP LESS 90: HCPCS | Performed by: INTERNAL MEDICINE

## 2021-11-30 PROCEDURE — G8752 SYS BP LESS 140: HCPCS | Performed by: INTERNAL MEDICINE

## 2021-11-30 PROCEDURE — G8536 NO DOC ELDER MAL SCRN: HCPCS | Performed by: INTERNAL MEDICINE

## 2021-11-30 PROCEDURE — G8427 DOCREV CUR MEDS BY ELIG CLIN: HCPCS | Performed by: INTERNAL MEDICINE

## 2021-11-30 PROCEDURE — G8419 CALC BMI OUT NRM PARAM NOF/U: HCPCS | Performed by: INTERNAL MEDICINE

## 2021-11-30 PROCEDURE — 3017F COLORECTAL CA SCREEN DOC REV: CPT | Performed by: INTERNAL MEDICINE

## 2021-11-30 PROCEDURE — 99214 OFFICE O/P EST MOD 30 MIN: CPT | Performed by: INTERNAL MEDICINE

## 2021-11-30 PROCEDURE — 1101F PT FALLS ASSESS-DOCD LE1/YR: CPT | Performed by: INTERNAL MEDICINE

## 2021-11-30 RX ORDER — METOPROLOL SUCCINATE 100 MG/1
TABLET, EXTENDED RELEASE ORAL
Qty: 90 TABLET | Refills: 3
Start: 2021-11-30 | End: 2021-12-20 | Stop reason: SDUPTHER

## 2021-11-30 NOTE — PROGRESS NOTES
HISTORY OF PRESENT ILLNESS  Torsten Noble is a 78 y.o. male. Patient with R Nancy Wolff [facility]. On follow up he has complained of shortness of breath at times on exertion. No orthopnea PND denies any chest pain or tightness. No peripheral edema. 11/2021  Patient seen today for follow-up. He comes in with complaint of increased shortness of breath that is going on for about 1 to 2 months which is progressively getting worse. Denies any chest pain. He feels fatigue and tired    Hypertension  The history is provided by the patient. This is a new problem. The problem occurs constantly. The problem has not changed since onset. Pertinent negatives include no chest pain, no abdominal pain, no headaches and no shortness of breath. Nothing aggravates the symptoms. Follow-up  Pertinent negatives include no chest pain, no abdominal pain, no headaches and no shortness of breath. Review of Systems   Constitutional: Negative for chills and fever. HENT: Negative for nosebleeds. Eyes: Negative for blurred vision and double vision. Respiratory: Negative for cough, hemoptysis, sputum production, shortness of breath and wheezing. Cardiovascular: Negative for chest pain, palpitations, orthopnea, claudication, leg swelling and PND. Gastrointestinal: Negative for abdominal pain, heartburn, nausea and vomiting. Musculoskeletal: Negative for myalgias. Skin: Negative for rash. Neurological: Negative for dizziness, weakness and headaches. Endo/Heme/Allergies: Does not bruise/bleed easily. No family history on file.     Past Medical History:   Diagnosis Date    Coronary atherosclerosis of native coronary artery     No angina, H/O stent     COVID-19     H/O colonoscopy at 53yo    patient report - unable to get records    H/O colonoscopy with polypectomy 08/2017    polyp - follow-up 3 years   Sara See H/O colonoscopy with polypectomy 08/13/2020    Dr. Denisse Holman; polyps x 3 (tubular adenoma x 2 and benign mucosal tag); diverticulosis in the sigmoid colon; repeat colonoscopy due in 3 years    HTN (hypertension)     Other and unspecified hyperlipidemia     On Lipitor    Postsurgical percutaneous transluminal coronary angioplasty status        Past Surgical History:   Procedure Laterality Date    HX CORONARY STENT PLACEMENT  2003    LAD/JENY       Social History     Tobacco Use    Smoking status: Never Smoker    Smokeless tobacco: Never Used   Substance Use Topics    Alcohol use: Yes     Alcohol/week: 0.0 standard drinks     Comment: occasional       No Known Allergies    Current Outpatient Medications   Medication Sig    metoprolol succinate (TOPROL-XL) 100 mg tablet TAKE 1 TABLET BY MOUTH EVERY DAY    atorvastatin (Lipitor) 20 mg tablet Take 1 Tab by mouth daily.  cyanocobalamin (VITAMIN B-12) 1,000 mcg sublingual tablet Take 1 Tab by mouth daily.  aspirin delayed-release 81 mg tablet Take 81 mg by mouth daily. No current facility-administered medications for this visit. Facility-Administered Medications Ordered in Other Visits   Medication Dose Route Frequency    regadenoson (LEXISCAN) injection 0.4 mg  0.4 mg IntraVENous ONCE       Visit Vitals  /80   Pulse 68   Ht 5' 4\" (1.626 m)   Wt 67.1 kg (148 lb)   BMI 25.40 kg/m²         Physical Exam  Constitutional:       Appearance: He is well-developed. HENT:      Head: Normocephalic and atraumatic. Eyes:      Conjunctiva/sclera: Conjunctivae normal.   Neck:      Thyroid: No thyromegaly. Vascular: No JVD. Trachea: No tracheal deviation. Cardiovascular:      Rate and Rhythm: Normal rate and regular rhythm. Heart sounds: Normal heart sounds. No murmur heard. No friction rub. No gallop. Pulmonary:      Effort: No respiratory distress. Breath sounds: Normal breath sounds. No wheezing or rales. Chest:      Chest wall: No tenderness. Abdominal:      Palpations: Abdomen is soft. Tenderness:  There is no abdominal tenderness. Musculoskeletal:      Cervical back: Neck supple. Skin:     General: Skin is warm and dry. Neurological:      Mental Status: He is alert and oriented to person, place, and time. Mr. Juliann Cuevas has a reminder for a \"due or due soon\" health maintenance. I have asked that he contact his primary care provider for follow-up on this health maintenance. NUCLEAR [id number]     Findings:   1. Stress images reveal normal Myoview distrubution in all the LV segments in short axis, vertical and horizontal long axis views. 2. Resting images have a normal uptake. 3. Gated images reveal normal wall motion and the ejection fraction is calculated to be 86%. Conclusion:   1. Normal perfusion scan. 2. Normal wall motion and ejection fraction. 3. Low risk scan. Assessment       ICD-10-CM ICD-9-CM    1. Atherosclerosis of native coronary artery of native heart with angina pectoris ([facility] [address]  I25.119 414.01 T4, FREE     413.9 TSH 3RD GENERATION      HEPATIC FUNCTION PANEL      LIPID PANEL    Stable negative nuclear stress test monitor clinically   2. COVID-19  U07.1 079.89     Recovered well. Recheck echo for LV function in view of shortness of breath   3. Essential hypertension  I10 401.9     Stable monitor   4. Postsurgical percutaneous transluminal coronary angioplasty status  Z98.61 V45.82    5. Shortness of breath  R06.02 786.05     Evaluation in progress   6. SVT (supraventricular tachycardia) ([facility])  I47.1 427.89 T4, FREE      TSH 3RD GENERATION      HEPATIC FUNCTION PANEL      LIPID PANEL    Too long episode during stress test increase Toprol   7. Hyperlipidemia, unspecified hyperlipidemia type  E78.5 272.4     Elevated. Was not on statin is back on statin recheck lab   2019  Episode of shortness of breath possible angina equivalent. We will proceed with nuclear stress test.  Continue current therapy  2021  Nuclear stress test negative. Await echo.   Possible need for high-resolution CT scan in view of history of Covid pneumonia and increased shortness of breath. Long episode of SVT noted on treadmill. I have increase Toprol to 100 mg a day. Check TSH. Follow-up lipid  Medications Discontinued During This Encounter   Medication Reason    metoprolol succinate (TOPROL-XL) 50 mg XL tablet        Orders Placed This Encounter    T4, FREE     Standing Status:   Future     Standing Expiration Date:   12/1/2022    TSH 3RD GENERATION     Standing Status:   Future     Standing Expiration Date:   12/30/2021    HEPATIC FUNCTION PANEL     Standing Status:   Future     Standing Expiration Date:   5/31/2022    LIPID PANEL     Standing Status:   Future     Standing Expiration Date:   5/31/2022    metoprolol succinate (TOPROL-XL) 100 mg tablet     Sig: TAKE 1 TABLET BY MOUTH EVERY DAY     Dispense:  90 Tablet     Refill:  3       Follow-up and Dispositions    · Return for F/u after tests.

## 2021-12-01 ENCOUNTER — HOSPITAL ENCOUNTER (OUTPATIENT)
Dept: LAB | Age: 71
Discharge: HOME | End: 2021-12-01
Payer: MEDICARE

## 2021-12-01 DIAGNOSIS — I47.1 SVT (SUPRAVENTRICULAR TACHYCARDIA) (HCC): ICD-10-CM

## 2021-12-01 DIAGNOSIS — I25.119 ATHEROSCLEROSIS OF NATIVE CORONARY ARTERY OF NATIVE HEART WITH ANGINA PECTORIS (HCC): ICD-10-CM

## 2021-12-01 LAB
ALBUMIN SERPL-MCNC: 3.8 G/DL (ref 3.4–5)
ALBUMIN/GLOB SERPL: 1 {RATIO} (ref 0.8–1.7)
ALP SERPL-CCNC: 92 U/L (ref 45–117)
ALT SERPL-CCNC: 13 U/L (ref 16–61)
AST SERPL-CCNC: 23 U/L (ref 10–38)
BILIRUB DIRECT SERPL-MCNC: <0.1 MG/DL (ref 0–0.2)
BILIRUB SERPL-MCNC: 0.5 MG/DL (ref 0.2–1)
CHOLEST SERPL-MCNC: 161 MG/DL
GLOBULIN SER CALC-MCNC: 3.8 G/DL (ref 2–4)
HDLC SERPL-MCNC: 45 MG/DL (ref 40–60)
HDLC SERPL: 3.6 {RATIO} (ref 0–5)
LDLC SERPL CALC-MCNC: 85.4 MG/DL (ref 0–100)
LIPID PROFILE,FLP: ABNORMAL
PROT SERPL-MCNC: 7.6 G/DL (ref 6.4–8.2)
T4 FREE SERPL-MCNC: 1 NG/DL (ref 0.7–1.5)
TRIGL SERPL-MCNC: 153 MG/DL (ref ?–150)
TSH SERPL DL<=0.05 MIU/L-ACNC: 3.53 UIU/ML (ref 0.36–3.74)
VLDLC SERPL CALC-MCNC: 30.6 MG/DL

## 2021-12-01 PROCEDURE — 80061 LIPID PANEL: CPT

## 2021-12-01 PROCEDURE — 84443 ASSAY THYROID STIM HORMONE: CPT

## 2021-12-01 PROCEDURE — 84439 ASSAY OF FREE THYROXINE: CPT

## 2021-12-01 PROCEDURE — 36415 COLL VENOUS BLD VENIPUNCTURE: CPT

## 2021-12-01 PROCEDURE — 80076 HEPATIC FUNCTION PANEL: CPT

## 2021-12-20 RX ORDER — METOPROLOL SUCCINATE 100 MG/1
TABLET, EXTENDED RELEASE ORAL
Qty: 90 TABLET | Refills: 3 | Status: SHIPPED | OUTPATIENT
Start: 2021-12-20

## 2022-02-09 DIAGNOSIS — E78.5 HYPERLIPIDEMIA, UNSPECIFIED HYPERLIPIDEMIA TYPE: ICD-10-CM

## 2022-02-09 RX ORDER — ATORVASTATIN CALCIUM 20 MG/1
TABLET, FILM COATED ORAL
Qty: 90 TABLET | Refills: 3 | Status: SHIPPED | OUTPATIENT
Start: 2022-02-09

## 2022-03-19 PROBLEM — I10 ESSENTIAL HYPERTENSION: Status: ACTIVE | Noted: 2017-09-07

## 2022-03-19 PROBLEM — U07.1 COVID-19: Status: ACTIVE | Noted: 2021-03-17

## 2022-05-07 NOTE — TELEPHONE ENCOUNTER
Idania Snowden [phone] (home)  advising patient that no labs need to be done prior to appointment for Dr. Cristal Horton. Labs that were ordered by cardiologist can be used. hard copy, drawn during this pregnancy

## 2022-05-26 ENCOUNTER — OFFICE VISIT (OUTPATIENT)
Dept: PULMONOLOGY | Age: 72
End: 2022-05-26
Payer: MEDICARE

## 2022-05-26 VITALS — WEIGHT: 147 LBS | BODY MASS INDEX: 25.1 KG/M2 | HEIGHT: 64 IN

## 2022-05-26 DIAGNOSIS — R06.02 SHORTNESS OF BREATH: Primary | ICD-10-CM

## 2022-05-26 PROCEDURE — 94060 EVALUATION OF WHEEZING: CPT | Performed by: INTERNAL MEDICINE

## 2022-05-26 PROCEDURE — 94727 GAS DIL/WSHOT DETER LNG VOL: CPT | Performed by: INTERNAL MEDICINE

## 2022-05-26 PROCEDURE — 94729 DIFFUSING CAPACITY: CPT | Performed by: INTERNAL MEDICINE

## 2022-06-07 ENCOUNTER — OFFICE VISIT (OUTPATIENT)
Dept: PULMONOLOGY | Age: 72
End: 2022-06-07

## 2022-06-07 VITALS
HEIGHT: 64 IN | RESPIRATION RATE: 16 BRPM | HEART RATE: 63 BPM | SYSTOLIC BLOOD PRESSURE: 118 MMHG | OXYGEN SATURATION: 98 % | WEIGHT: 149.6 LBS | BODY MASS INDEX: 25.54 KG/M2 | TEMPERATURE: 98.1 F | DIASTOLIC BLOOD PRESSURE: 76 MMHG

## 2022-06-07 DIAGNOSIS — R94.2 ABNORMAL PFT: ICD-10-CM

## 2022-06-07 DIAGNOSIS — R06.02 SOB (SHORTNESS OF BREATH) ON EXERTION: ICD-10-CM

## 2022-06-07 DIAGNOSIS — J84.9 ILD (INTERSTITIAL LUNG DISEASE) (HCC): ICD-10-CM

## 2022-06-07 DIAGNOSIS — R06.02 SOB (SHORTNESS OF BREATH) ON EXERTION: Primary | ICD-10-CM

## 2022-06-07 PROCEDURE — 94618 PULMONARY STRESS TESTING: CPT | Performed by: INTERNAL MEDICINE

## 2022-06-07 NOTE — PATIENT INSTRUCTIONS
Reactive Airway Disease: Care Instructions  Your Care Instructions     Reactive airway disease is a breathing problem that appears as wheezing, a whistling noise in your airways. It may be caused by a viral or bacterial infection, allergies, tobacco smoke, or something else in the environment. When you are around these triggers, your body releases chemicals that make the airways get tight. Reactive airway disease is a lot like asthma. Both can cause wheezing. But asthma is ongoing, while reactive airway disease may occur only now and then. Tests can be done to tell whether you have asthma. You may take the same medicines used to treat asthma. Good home care and follow-up care with your doctor can help you recover. Follow-up care is a key part of your treatment and safety. Be sure to make and go to all appointments, and call your doctor if you are having problems. It's also a good idea to know your test results and keep a list of the medicines you take. How can you care for yourself at home? · Take your medicines exactly as prescribed. Call your doctor if you think you are having a problem with your medicine. · Do not smoke or allow others to smoke around you. If you need help quitting, talk to your doctor about stop-smoking programs and medicines. These can increase your chances of quitting for good. · If you know what caused your wheezing (such as perfume or the odor of household chemicals), try to avoid it in the future. · Wash your hands several times a day, and consider using hand gels or wipes that contain alcohol. This can prevent colds and other infections. When should you call for help? Call 911 anytime you think you may need emergency care. For example, call if:    · You have severe trouble breathing. Watch closely for changes in your health, and be sure to contact your doctor if:    · You cough up yellow, dark brown, or bloody mucus.     · You have a fever.     · Your wheezing gets worse. Where can you learn more? Go to http://www.gray.com/  Enter G945 in the search box to learn more about \"Reactive Airway Disease: Care Instructions. \"  Current as of: July 6, 2021               Content Version: 13.2  © 0428-4799 [employer]. Care instructions adapted under license by [employer] (which disclaims liability or warranty for this information). If you have questions about a medical condition or this instruction, always ask your healthcare professional. [place] [address] any warranty or liability for your use of this information.

## 2022-06-07 NOTE — PROGRESS NOTES
[facility]  Pulmonary, Critical Care, and Sleep Medicine      Pulmonary Office Initial referral report    Name: Tasha Emmanuel     : 1950     Date: 2022        Subjective:   Patient has been referred for evaluation of: SOB on exertion. Patient is a 73 y.o. male who states that when  he is exercising he has noted significant limitation at times when he feels short of breath. However recently with riding his bicycle he is not feeling the same symptoms. Patient had [phone] with structural lung involvement in 2021 with slow recovery. He was treated with Decadron with improvement. He recovered fairly well and more recently started noticing the symptoms of shortness of breath limiting his exercise routine. He denies any nocturnal symptoms  He denies any cough except for occasionally in the morning when he would have some mucus or if he is out working in his garden. He denies any wheezing  Denies chest pain or chest tightness  He has not been using any inhalers  He does have history of coronary artery disease and has been on Toprol 100 mg dosage  He had cardiac stress testing-unremarkable  Patient is a never smoker  He retired from doing retail work  Chest x-ray done in 2021 had shown bibasilar streaky infiltrates    Review of data:  I have personally reviewed all data-clinical encounters, imaging, outside test results pertinent to patient's care.   Testing:  CXR  CT scan-chest 2021  PFT-2022  Echo-2021  6 min walk-ordered today    Vaccinations:  Pneumococcal  Influenza  COVID-19 including booster      Past Medical History:   Diagnosis Date    Coronary atherosclerosis of native coronary artery     No angina, H/O stent     [phone]     H/O colonoscopy at 51yo    patient report - unable to get records    H/O colonoscopy with polypectomy 2017    polyp - follow-up 3 years   Leo Castillo H/O colonoscopy with polypectomy 2020    Dr. Kristina Moe; polyps x 3 (tubular adenoma x 2 and benign mucosal tag); diverticulosis in the sigmoid colon; repeat colonoscopy due in 3 years    HTN (hypertension)     Other and unspecified hyperlipidemia     On Lipitor    Postsurgical percutaneous transluminal coronary angioplasty status        Past Surgical History:   Procedure Laterality Date    HX CORONARY STENT PLACEMENT  2003    LAD/JENY       Social History     Socioeconomic History    Marital status:    Occupational History    Occupation: shop owner / retail     Tobacco Use    Smoking status: Never Smoker    Smokeless tobacco: Never Used   Substance and Sexual Activity    Alcohol use: Yes     Alcohol/week: 0.0 standard drinks     Comment: occasional    Drug use: No    Sexual activity: Not Currently     Partners: Female       History reviewed. No pertinent family history. No Known Allergies    . Current Outpatient Medications   Medication Sig Dispense Refill    atorvastatin (LIPITOR) 20 mg tablet TAKE 1 TABLET BY MOUTH EVERY DAY 90 Tablet 3    metoprolol succinate (TOPROL-XL) 100 mg tablet TAKE 1 TABLET BY MOUTH EVERY DAY 90 Tablet 3    aspirin delayed-release 81 mg tablet Take 81 mg by mouth daily.  cyanocobalamin (VITAMIN B-12) 1,000 mcg sublingual tablet Take 1 Tab by mouth daily.  (Patient not taking: Reported on 6/7/2022) 30 Tab 11     Review of Systems:  HEENT: No epistaxis, no nasal drainage, no difficulty in swallowing, no redness in eyes  Respiratory: as above  Cardiovascular: no chest pain, no palpitations, no chronic leg edema, no syncope  Gastrointestinal: no abd pain, no vomiting, no diarrhea, no bleeding symptoms  Genitourinary: No urinary symptoms or hematuria  Integument/breast: No ulcers or rashes  Musculoskeletal:Neg  Neurological: No focal weakness, no seizures, no headaches  Behvioral/Psych: No anxiety, no depression  Constitutional: No fever, no chills, no weight loss, no night sweats     Objective:     Visit Vitals  BP 118/76 (BP 1 Location: Right upper arm, BP Patient Position: Sitting, BP Cuff Size: Large adult)   Pulse 63   Temp 98.1 °F (36.7 °C) (Oral)   Resp 16   Ht 5' 4\" (1.626 m)   Wt 67.9 kg (149 lb 9.6 oz)   SpO2 98% Comment: RA Rest   BMI 25.68 kg/m²        Physical Exam:   General: comfortable, no acute distress  HEENT: pupils reactive, sclera anicteric, EOM intact  Neck: No adenopathy or thyroid swelling, no lymphadenopathy or JVD, supple  CVS: S1S2 no murmurs  RS: Mod AE bilaterally, no tactile fremitus or egophony, no accessory muscle use  Abd: soft, non tender, no hepatosplenomegaly  Neuro: non focal, awake, alert  Extrm: no leg edema, clubbing or cyanosis  Skin: no rash    Data review:   Pertinent labs: CBC, BMP, LFT's  Serologies: ILD, SULAIMAN  IgE:  Sputum eosinophils: Allergy panel:  Alpha-1 antitrypsin;  ACE level:      PFT:      Date FVC FEV1  FEV1/FVC CPN82-01 TLC  FRC RV/TLC VC DLCO   5/26/2022  76  77  reduced  67   70    51/70                                         6 min walk test;      Results for orders placed or performed during the hospital encounter of 02/23/21   EKG, 12 LEAD, INITIAL   Result Value Ref Range    Ventricular Rate 72 BPM    Atrial Rate 72 BPM    P-R Interval 138 ms    QRS Duration 80 ms    Q-T Interval 364 ms    QTC Calculation (Bezet) 398 ms    Calculated P Axis 33 degrees    Calculated R Axis -15 degrees    Calculated T Axis 45 degrees    Diagnosis       Normal sinus rhythm  Normal ECG  No previous ECGs available  Confirmed by Mitesh Mealing ([id number]) on 2/23/2021 12:12:07 PM         Imaging:  I have personally reviewed the patients radiographs and have reviewed the reports:  XR Results (most recent):  Results from Hospital Encounter encounter on 02/23/21    XR CHEST PORT    Narrative  Portable CXR:    HISTORY: Hypotension. Positive Covid test.    Comparison 2/16/2021    Cardiac silhouette is normal in size. No vascular congestion.  Mild patchy  density in the lung bases, slightly less severe than before with better  expansion of the lungs. No pleural effusion. Impression  Minimal bilateral basilar infiltrate/atelectasis. CT Results (most recent):  Results from Hospital Encounter encounter on 02/23/21    CTA CHEST W OR W WO CONT    Narrative  CT Of The Chest With Contrast.    CPT CODE: 84050    CLINICAL HISTORY: Covid 19 infection with elevated d-dimer and past cardiac  history. .    TECHNIQUE: After administration of nonionic intravenous contrast , 100 cc  Isovue-370 IV, 8.73 mm MDCT helical scan was obtained at the chest.  Coronal and  sagittal MIP images created to better evaluate the segmental and subsegmental  arterial anatomy. All CT scans at this facility are performed using dose  optimization techniques as appropriate to a performed exam, to include automated  exposure control, adjustment of the mA and/or kV according to patient's size  (including appropriate matching for site specific examinations), or use of  iterative reconstruction technique. COMPARISON: None. 02/16/2021    FINDINGS:    Lungs: Multifocal groundglass densities all lobes compatible with clinical  history of Covid 19 infection. Mild improvement since prior study. Airways  patent. Pleural space: No effusions. Heart and pericardium: Mild burden coronary artery disease. No right heart  enlargement. Great vessels: Aorta and arch vessels normally enhancing. No aneurysm or  dissection. Coronary arteries normally enhancing. Main pulmonary artery normal  caliber. Lymph nodes: No adenopathy. Base of neck: Thyroid gland normal.    Upper abdomen: Included solid organs and hollow viscera are unremarkable. Impression  No pulmonary embolism. Multifocal pneumonia showing mild improvement. Commonly reported features of  Covid-19 pneumonia are present.  Other processes such as influenza pneumonia and  organizing pneumonia, as can be seen with drug toxicity and connective tissue  disease, can cause a similar imaging pattern. (Covid-19 typical)    . 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 Need more recent evaluation for any residual structural problems. Would benefit from adding bronchodilators to current treatment. Will avoid beta agonist since patient is on beta-blockers. · History of CAD s/p PTCA  · S/p COVID-19 with bilateral pneumonia-February 2021  · Essential hypertension      RECOMMENDATIONS:   · Discussed with patient findings on PFT  · Will order HRCT  · Will initiate treatment with Spiriva Respimat 1.25 mcg  · Encourage incremental and continued physical activity  · Maintain active lifestyle and healthy weight  · Preventive kwnaymdbkkpl-bs-fd-date  · Will follow up in 3 months to assess response to added treatment measures. We will follow-up on CT imaging and if any additional work-up needed will see patient sooner     Health maintenance screens deferred to Primary care provider. Micah Menchaca MD    This patient has a high complexity chronic care condition   This Visit needed Moderate complexity medically necessary decision making and management plans. Please note that this dictation was completed with [employer], the computer voice recognition software. Quite often unanticipated grammatical, syntax, homophones, and other interpretive errors are inadvertently transcribed by the computer software. Please disregard these errors. Please excuse any errors that have escaped final proofreading.

## 2022-06-14 ENCOUNTER — HOSPITAL ENCOUNTER (OUTPATIENT)
Dept: CT IMAGING | Age: 72
Discharge: HOME | End: 2022-06-14
Attending: INTERNAL MEDICINE
Payer: MEDICARE

## 2022-06-14 ENCOUNTER — TELEPHONE (OUTPATIENT)
Dept: FAMILY MEDICINE CLINIC | Age: 72
End: 2022-06-14

## 2022-06-14 DIAGNOSIS — J84.9 ILD (INTERSTITIAL LUNG DISEASE) (HCC): ICD-10-CM

## 2022-06-14 PROCEDURE — 71250 CT THORAX DX C-: CPT

## 2022-06-14 NOTE — TELEPHONE ENCOUNTER
----- Message from Ronna Garner sent at 6/14/2022  2:39 PM EDT -----  Subject: Message to Provider    QUESTIONS  Information for Provider? Pt would like to know if he has any vaccines   due. Please advise via [employer] message to him  ---------------------------------------------------------------------------  --------------  CALL BACK INFO  What is the best way for the office to contact you? OK to respond with   electronic message via [employer] portal (only for patients who have   registered [employer] account)  Preferred Call Back Phone Number? [phone]  ---------------------------------------------------------------------------  --------------  SCRIPT ANSWERS  Relationship to Patient?  Self

## 2022-06-15 NOTE — TELEPHONE ENCOUNTER
Nurse to advise that the newshingles recombinant 2 dose series is due once after the age of 48 if he has not received that. We also can review this closer at his annual visit in Oct.  Schedule if not already scheduled.

## 2022-10-03 ENCOUNTER — TELEPHONE (OUTPATIENT)
Dept: FAMILY MEDICINE CLINIC | Age: 72
End: 2022-10-03

## 2022-10-03 DIAGNOSIS — I10 ESSENTIAL HYPERTENSION: Primary | ICD-10-CM

## 2022-10-03 DIAGNOSIS — E78.5 HYPERLIPIDEMIA, UNSPECIFIED HYPERLIPIDEMIA TYPE: ICD-10-CM

## 2022-10-03 DIAGNOSIS — Z12.5 PROSTATE CANCER SCREENING: ICD-10-CM

## 2022-10-03 NOTE — TELEPHONE ENCOUNTER
----- Message from Kyler Quiroz sent at 10/3/2022 10:14 AM EDT -----  Subject: Referral Request    Reason for referral request? Bloodwork request  Provider patient wants to be referred to(if known): Sarita Hughes    Provider Phone Number(if known): [phone]    Additional Information for Provider?  Pt has an appt scheduled on   10/12/2022  ---------------------------------------------------------------------------  --------------  CALL BACK INFO    [phone]; OK to leave message on voicemail  ---------------------------------------------------------------------------  --------------

## 2022-10-07 NOTE — PROGRESS NOTES
Chief Complaint   Patient presents with    PPD Reading       PPD read and results [email or web address]. Result: 0 mm induration.   Interpretation: Negative  Allergic reaction: no none

## 2022-10-10 ENCOUNTER — HOSPITAL ENCOUNTER (OUTPATIENT)
Dept: LAB | Age: 72
Discharge: HOME | End: 2022-10-10
Payer: MEDICARE

## 2022-10-10 DIAGNOSIS — E78.5 HYPERLIPIDEMIA, UNSPECIFIED HYPERLIPIDEMIA TYPE: ICD-10-CM

## 2022-10-10 DIAGNOSIS — I10 ESSENTIAL HYPERTENSION: ICD-10-CM

## 2022-10-10 DIAGNOSIS — Z12.5 PROSTATE CANCER SCREENING: ICD-10-CM

## 2022-10-10 LAB
ALBUMIN SERPL-MCNC: 3.6 G/DL (ref 3.4–5)
ALBUMIN/GLOB SERPL: 1.1 {RATIO} (ref 0.8–1.7)
ALP SERPL-CCNC: 81 U/L (ref 45–117)
ALT SERPL-CCNC: 15 U/L (ref 16–61)
ANION GAP SERPL CALC-SCNC: 5 MMOL/L (ref 3–18)
AST SERPL-CCNC: 20 U/L (ref 10–38)
BASOPHILS # BLD: 0.1 K/UL (ref 0–0.1)
BASOPHILS NFR BLD: 1 % (ref 0–2)
BILIRUB SERPL-MCNC: 0.4 MG/DL (ref 0.2–1)
BUN SERPL-MCNC: 18 MG/DL (ref 7–18)
BUN/CREAT SERPL: 17 (ref 12–20)
CALCIUM SERPL-MCNC: 8.9 MG/DL (ref 8.5–10.1)
CHLORIDE SERPL-SCNC: 108 MMOL/L (ref 100–111)
CHOLEST SERPL-MCNC: 187 MG/DL
CO2 SERPL-SCNC: 28 MMOL/L (ref 21–32)
CREAT SERPL-MCNC: 1.07 MG/DL (ref 0.6–1.3)
DIFFERENTIAL METHOD BLD: ABNORMAL
EOSINOPHIL # BLD: 0.6 K/UL (ref 0–0.4)
EOSINOPHIL NFR BLD: 8 % (ref 0–5)
ERYTHROCYTE [DISTWIDTH] IN BLOOD BY AUTOMATED COUNT: 13 % (ref 11.6–14.5)
GLOBULIN SER CALC-MCNC: 3.2 G/DL (ref 2–4)
GLUCOSE SERPL-MCNC: 93 MG/DL (ref 74–99)
HCT VFR BLD AUTO: 42.4 % (ref 36–48)
HDLC SERPL-MCNC: 40 MG/DL (ref 40–60)
HDLC SERPL: 4.7 {RATIO} (ref 0–5)
HGB BLD-MCNC: 13.6 G/DL (ref 13–16)
IMM GRANULOCYTES # BLD AUTO: 0 K/UL (ref 0–0.04)
IMM GRANULOCYTES NFR BLD AUTO: 0 % (ref 0–0.5)
LDLC SERPL CALC-MCNC: 107 MG/DL (ref 0–100)
LIPID PROFILE,FLP: ABNORMAL
LYMPHOCYTES # BLD: 2.2 K/UL (ref 0.9–3.6)
LYMPHOCYTES NFR BLD: 31 % (ref 21–52)
MCH RBC QN AUTO: 27.3 PG (ref 24–34)
MCHC RBC AUTO-ENTMCNC: 32.1 G/DL (ref 31–37)
MCV RBC AUTO: 85 FL (ref 78–100)
MONOCYTES # BLD: 0.6 K/UL (ref 0.05–1.2)
MONOCYTES NFR BLD: 9 % (ref 3–10)
NEUTS SEG # BLD: 3.8 K/UL (ref 1.8–8)
NEUTS SEG NFR BLD: 52 % (ref 40–73)
NRBC # BLD: 0 K/UL (ref 0–0.01)
NRBC BLD-RTO: 0 PER 100 WBC
PLATELET # BLD AUTO: 204 K/UL (ref 135–420)
PMV BLD AUTO: 10.3 FL (ref 9.2–11.8)
POTASSIUM SERPL-SCNC: 4.8 MMOL/L (ref 3.5–5.5)
PROT SERPL-MCNC: 6.8 G/DL (ref 6.4–8.2)
PSA SERPL-MCNC: 2.5 NG/ML (ref 0–4)
RBC # BLD AUTO: 4.99 M/UL (ref 4.35–5.65)
SODIUM SERPL-SCNC: 141 MMOL/L (ref 136–145)
TRIGL SERPL-MCNC: 200 MG/DL (ref ?–150)
VLDLC SERPL CALC-MCNC: 40 MG/DL
WBC # BLD AUTO: 7.3 K/UL (ref 4.6–13.2)

## 2022-10-10 PROCEDURE — 85025 COMPLETE CBC W/AUTO DIFF WBC: CPT

## 2022-10-10 PROCEDURE — 84153 ASSAY OF PSA TOTAL: CPT

## 2022-10-10 PROCEDURE — 80061 LIPID PANEL: CPT

## 2022-10-10 PROCEDURE — 36415 COLL VENOUS BLD VENIPUNCTURE: CPT

## 2022-10-10 PROCEDURE — 80053 COMPREHEN METABOLIC PANEL: CPT

## 2022-10-12 ENCOUNTER — OFFICE VISIT (OUTPATIENT)
Dept: FAMILY MEDICINE CLINIC | Age: 72
End: 2022-10-12
Payer: MEDICARE

## 2022-10-12 VITALS
WEIGHT: 148 LBS | HEIGHT: 64 IN | HEART RATE: 80 BPM | OXYGEN SATURATION: 99 % | BODY MASS INDEX: 25.27 KG/M2 | BODY MASS INDEX: 25.27 KG/M2 | OXYGEN SATURATION: 99 % | HEIGHT: 64 IN | TEMPERATURE: 97.4 F | WEIGHT: 148 LBS | HEART RATE: 80 BPM | SYSTOLIC BLOOD PRESSURE: 128 MMHG | RESPIRATION RATE: 14 BRPM | TEMPERATURE: 97.4 F | RESPIRATION RATE: 14 BRPM | DIASTOLIC BLOOD PRESSURE: 84 MMHG | DIASTOLIC BLOOD PRESSURE: 84 MMHG | SYSTOLIC BLOOD PRESSURE: 128 MMHG

## 2022-10-12 DIAGNOSIS — I10 ESSENTIAL HYPERTENSION: Primary | ICD-10-CM

## 2022-10-12 DIAGNOSIS — Z23 ENCOUNTER FOR IMMUNIZATION: ICD-10-CM

## 2022-10-12 DIAGNOSIS — Z71.89 ADVANCED DIRECTIVES, COUNSELING/DISCUSSION: ICD-10-CM

## 2022-10-12 DIAGNOSIS — Z00.00 MEDICARE ANNUAL WELLNESS VISIT, SUBSEQUENT: Primary | ICD-10-CM

## 2022-10-12 DIAGNOSIS — I25.119 ATHEROSCLEROSIS OF NATIVE CORONARY ARTERY OF NATIVE HEART WITH ANGINA PECTORIS (HCC): ICD-10-CM

## 2022-10-12 DIAGNOSIS — E78.5 HYPERLIPIDEMIA, UNSPECIFIED HYPERLIPIDEMIA TYPE: ICD-10-CM

## 2022-10-12 PROCEDURE — 1101F PT FALLS ASSESS-DOCD LE1/YR: CPT | Performed by: FAMILY MEDICINE

## 2022-10-12 PROCEDURE — G8536 NO DOC ELDER MAL SCRN: HCPCS | Performed by: FAMILY MEDICINE

## 2022-10-12 PROCEDURE — G8417 CALC BMI ABV UP PARAM F/U: HCPCS | Performed by: FAMILY MEDICINE

## 2022-10-12 PROCEDURE — G8427 DOCREV CUR MEDS BY ELIG CLIN: HCPCS | Performed by: FAMILY MEDICINE

## 2022-10-12 PROCEDURE — G8752 SYS BP LESS 140: HCPCS | Performed by: FAMILY MEDICINE

## 2022-10-12 PROCEDURE — 3017F COLORECTAL CA SCREEN DOC REV: CPT | Performed by: FAMILY MEDICINE

## 2022-10-12 PROCEDURE — 1123F ACP DISCUSS/DSCN MKR DOCD: CPT | Performed by: FAMILY MEDICINE

## 2022-10-12 PROCEDURE — G8754 DIAS BP LESS 90: HCPCS | Performed by: FAMILY MEDICINE

## 2022-10-12 PROCEDURE — 99497 ADVNCD CARE PLAN 30 MIN: CPT | Performed by: FAMILY MEDICINE

## 2022-10-12 PROCEDURE — 90694 VACC AIIV4 NO PRSRV 0.5ML IM: CPT | Performed by: FAMILY MEDICINE

## 2022-10-12 PROCEDURE — G0439 PPPS, SUBSEQ VISIT: HCPCS | Performed by: FAMILY MEDICINE

## 2022-10-12 PROCEDURE — 99214 OFFICE O/P EST MOD 30 MIN: CPT | Performed by: FAMILY MEDICINE

## 2022-10-12 PROCEDURE — G8510 SCR DEP NEG, NO PLAN REQD: HCPCS | Performed by: FAMILY MEDICINE

## 2022-10-12 PROCEDURE — G0463 HOSPITAL OUTPT CLINIC VISIT: HCPCS | Performed by: FAMILY MEDICINE

## 2022-10-12 NOTE — PROGRESS NOTES
Chief Complaint   Patient presents with    Annual Wellness Visit       This is an Initial Medicare Annual Wellness Exam (AWV) (Performed 12 months after IPPE or effective date of Medicare Part B enrollment, Once in a lifetime)    I have reviewed the patient's medical history in detail and updated the computerized patient record. Assessment/Plan   Education and counseling provided:  Are appropriate based on today's review and evaluation         ICD-10-CM ICD-9-CM    1. Medicare annual wellness visit, subsequent  Z00.00 V70.0       2. Advanced directives, counseling/discussion  Z71.89 V65.49 ADVANCE CARE PLANNING FIRST 27 MINS        Age and sex specific counseling. Screening for depression and alcoholism. Advanced directives / end of life planning discussion - see ACP notes. Care team updated. Shingles vaccine advised. Medicare recommended screening and prevention test table is filled out, reviewed, and provided to patient. Scanned to chart as well. Screening and prevention is up to date. Patient understands our medical plan. Patient has provided input and agrees with goals. All questions answered. Depression Risk Factor Screening     3 most recent PHQ Screens 10/12/2022   Little interest or pleasure in doing things Not at all   Feeling down, depressed, irritable, or hopeless Not at all   Total Score PHQ 2 0       Alcohol & Drug Abuse Risk Screen    Do you average more than 1 drink per night or more than 7 drinks a week: No    In the past three months have you have had more than 4 drinks containing alcohol on one occasion: No          Functional Ability and Level of Safety    Hearing: Hearing is good. Activities of Daily Living: The home contains: no safety equipment. Patient does total self care     Ambulation: with no difficulty      Fall Risk:  Fall Risk Assessment, last 12 mths 10/12/2022   Able to walk? Yes   Fall in past 12 months? 0   Do you feel unsteady?  0   Are you worried about falling 0      Abuse Screen:  Patient is not abused       Cognitive Screening    Has your family/caregiver stated any concerns about your memory: no       Health Maintenance Due     Health Maintenance Due   Topic Date Due    Shingrix Vaccine Age 49> (1 of 2) Never done    Depression Screen  10/04/2022     Had old shingles vaccine - zostavax.     Patient Care Team   Patient Care Team:  Dale Miller MD as PCP - General (Family Medicine)  Dale Miller MD as PCP - [address] [place] [address] Provider  Nicolette Walter MD (Internal Medicine Physician)  Melissa Yin MD (Cardiovascular Disease Physician)  Carolina Salmeron MD (Orthopedic Surgery)  Ej Mobley MD (Gastroenterology)    History     Patient Active Problem List   Diagnosis Code    Coronary atherosclerosis of native coronary artery I25.10    Hyperlipidemia E78.5    Postsurgical percutaneous transluminal coronary angioplasty status Z98.61    Essential hypertension I10    COVID-19 U07.1    SOB (shortness of breath) on exertion R06.02    Abnormal PFT R94.2     Past Medical History:   Diagnosis Date    Coronary atherosclerosis of native coronary artery     No angina, H/O stent     COVID-19     H/O colonoscopy at 53yo    patient report - unable to get records    H/O colonoscopy with polypectomy 08/2017    polyp - follow-up 3 years    H/O colonoscopy with polypectomy 08/13/2020    Dr. Abeba Ramon; polyps x 3 (tubular adenoma x 2 and benign mucosal tag); diverticulosis in the sigmoid colon; repeat colonoscopy due in 3 years    HTN (hypertension)     Other and unspecified hyperlipidemia     On Lipitor    Postsurgical percutaneous transluminal coronary angioplasty status       Past Surgical History:   Procedure Laterality Date    HX CORONARY STENT PLACEMENT  2003    LAD/JENY     Current Outpatient Medications   Medication Sig Dispense Refill    atorvastatin (LIPITOR) 20 mg tablet TAKE 1 TABLET BY MOUTH EVERY DAY 90 Tablet 3    metoprolol succinate (TOPROL-XL) 100 mg tablet TAKE 1 TABLET BY MOUTH EVERY DAY 90 Tablet 3    cyanocobalamin (VITAMIN B-12) 1,000 mcg sublingual tablet Take 1 Tab by mouth daily. 30 Tab 11    aspirin delayed-release 81 mg tablet Take 81 mg by mouth daily. tiotropium bromide (Spiriva Respimat) 1.25 mcg/actuation inhaler Take 2 Puffs by inhalation daily. (Patient not taking: No sig reported) 8 g 0     No Known Allergies    History reviewed. No pertinent family history. Social History     Tobacco Use    Smoking status: Never    Smokeless tobacco: Never   Substance Use Topics    Alcohol use:  Yes     Alcohol/week: 0.0 standard drinks     Comment: occasional       Judith Hall MD

## 2022-10-12 NOTE — PROGRESS NOTES
1. \"Have you been to the ER, urgent care clinic since your last visit? Hospitalized since your last visit? \" No    2. \"Have you seen or consulted any other health care providers outside of the [address] [place] [address] since your last visit? \" No     3. For patients aged 39-70: Has the patient had a colonoscopy / FIT/ Cologuard? Yes - no Care Gap present-due 8/2024      If the patient is female:    4. For patients aged 41-77: Has the patient had a mammogram within the past 2 years? NA - based on age or sex      11. For patients aged 21-65: Has the patient had a pap smear? NA - based on age or sex    Physician order obtained. Patient completed adult immunization consent form. Allergies, contraindications and recommendations reviewed with patient. High dose seasonal influenza vaccine administered IM left deltoid. Patient tolerated well. Patient remained in office for 15 minutes after injection and no adverse reactions were noted.

## 2022-10-12 NOTE — ACP (ADVANCE CARE PLANNING)
Advance Care Planning     General Advance Care Planning (ACP) Conversation      Date of Conversation: 10/12/2022  Conducted with: Patient with Decision Making Capacity    Healthcare Decision Maker:     Primary Decision Maker: Jocelyn Hernandez - Spouse - [phone]  Click here to complete [address] including selection of the Healthcare Decision Maker Relationship (ie \"Primary\")    Today we documented Decision Maker(s) consistent with Legal Next of Kin hierarchy.     Content/Action Overview:   Has NO ACP documents/care preferences - refer to ACP Clinical Specialist  Reviewed DNR/DNI and patient elects Full Code (Attempt Resuscitation)  Topics discussed: treatment goals, benefit/burden of treatment options, artificial nutrition, ventilation preferences, hospitalization preferences, resuscitation preferences, end of life care preferences (vegetative state/imminent death), and hospice care       Length of Voluntary ACP Conversation in minutes:  12 minutes    Anne Marie Gannon MD

## 2022-10-12 NOTE — PATIENT INSTRUCTIONS
Medicare Wellness Visit, Male    The best way to live healthy is to have a lifestyle where you eat a well-balanced diet, exercise regularly, limit alcohol use, and quit all forms of tobacco/nicotine, if applicable. Regular preventive services are another way to keep healthy. Preventive services (vaccines, screening tests, monitoring & exams) can help personalize your care plan, which helps you manage your own care. Screening tests can find health problems at the earliest stages, when they are easiest to treat. [facility] follows the current, evidence-based guidelines published by the [facility]iran[facility] Eva ([facility]) when recommending preventive services for our patients. Because we follow these guidelines, sometimes recommendations change over time as research supports it. (For example, a prostate screening blood test is no longer routinely recommended for men with no symptoms). Of course, you and your doctor may decide to screen more often for some diseases, based on your risk and co-morbidities (chronic disease you are already diagnosed with). Preventive services for you include:  - Medicare offers their members a free annual wellness visit, which is time for you and your primary care provider to discuss and plan for your preventive service needs. Take advantage of this benefit every year!  -All adults over age 72 should receive the recommended pneumonia vaccines. Current USPSTF guidelines recommend a series of two vaccines for the best pneumonia protection.   -All adults should have a flu vaccine yearly and tetanus vaccine every 10 years.  -All adults age 48 and older should receive the shingles vaccines (series of two vaccines).        -All adults age 38-68 who are overweight should have a diabetes screening test once every three years.   -Other screening tests & preventive services for persons with diabetes include: an eye exam to screen for diabetic retinopathy, a kidney function test, a foot exam, and stricter control over your cholesterol.   -Cardiovascular screening for adults with routine risk involves an electrocardiogram (ECG) at intervals determined by the provider.   -Colorectal cancer screening should be done for adults age 54-65 with no increased risk factors for colorectal cancer. There are a number of acceptable methods of screening for this type of cancer. Each test has its own benefits and drawbacks. Discuss with your provider what is most appropriate for you during your annual wellness visit. The different tests include: colonoscopy (considered the best screening method), a fecal occult blood test, a fecal DNA test, and sigmoidoscopy.  -All adults born between [facility] should be screened once for Hepatitis C.  -An Abdominal Aortic Aneurysm (AAA) Screening is recommended for men age 73-68 who has ever smoked in their lifetime.      Here is a list of your current Health Maintenance items (your personalized list of preventive services) with a due date:  Health Maintenance Due   Topic Date Due    Shingles Vaccine (1 of 2) Never done    Depresssion Screening  10/04/2022

## 2022-10-12 NOTE — PROGRESS NOTES
Chief Complaint   Patient presents with    Annual Wellness Visit     3 most recent PHQ Screens 10/12/2022   Little interest or pleasure in doing things Not at all   Feeling down, depressed, irritable, or hopeless Not at all   Total Score PHQ 2 0     Abuse Screening Questionnaire 10/12/2022   Do you ever feel afraid of your partner? N   Are you in a relationship with someone who physically or mentally threatens you? N   Is it safe for you to go home? Y     Fall Risk Assessment, last 12 mths 10/12/2022   Able to walk? Yes   Fall in past 12 months? 0   Do you feel unsteady? 0   Are you worried about falling 0     1. \"Have you been to the ER, urgent care clinic since your last visit? Hospitalized since your last visit? \" No     2. \"Have you seen or consulted any other health care providers outside of the [address] [place] [address] since your last visit? \" No      3. For patients aged 39-70: Has the patient had a colonoscopy / FIT/ Cologuard? Yes - no Care Gap present-due 8/2024        If the patient is female:     4. For patients aged 41-77: Has the patient had a mammogram within the past 2 years? NA - based on age or sex        11. For patients aged 21-65: Has the patient had a pap smear?  NA - based on age or sex

## 2022-10-13 ENCOUNTER — PATIENT OUTREACH (OUTPATIENT)
Dept: CASE MANAGEMENT | Age: 72
End: 2022-10-13

## 2022-10-13 NOTE — ACP (ADVANCE CARE PLANNING)
Advance Care Planning   Ambulatory ACP Specialist Patient Outreach    Date:  10/13/2022 10/19/22      ACP Specialist:  Chitra Mir LPN    Outreach call to patient in follow-up to ACP Specialist referral from:    [x] PCP  [] Provider   [] Ambulatory Care Management [] Other     For:                  [x] Advance Directive Assistance              [] Complete Portable DNR order              [] Complete POST/MOST              [] Code Status Discussion             [] Discuss Goals of Care             [] Early ACP Decision-Making              [] Other (Specify)    Date Referral Received: 10/12/22    Today's Outreach:  [x] First   [x] Second  [x] Third       Third outreach made by: [] Phone  [x] Email / mail    [] Socrat[facility]art     Intervention:  [] Spoke with Patient   [] Left VM requesting return call      Outcome: The first attempt was made to contact pt regarding the ACP referral. No answer on mobile phone. VM  is not available at this time. Will attempt second outreach within one week. Next Step:   [] ACP scheduled conversation  [x] Outreach again in one week               [x] Email / Mail ACP Info Sheets  [] Email / Mail Advance Directive   [x] Closing referral.  Routing closure to referring provider/staff and to ACP Specialist . [x] Closure letter mailed to patient with invitation to contact ACP Specialist if / when ready. Thank you for this referral.    10/19/22  The second attempt was made to contact pt regarding ACP referral. No answer on mobile phone.  left requesting a return call. Will outreach again within one week. 10/26/22  The final attempt was made to reach the pt. ACP documents sent to pt via e-mail. ACP teams contact information was included. We will close the referral at this time.

## 2022-10-13 NOTE — PROGRESS NOTES
SUBJECTIVE  Chief Complaint   Patient presents with    Hypertension     Patient presents for follow-up on their hypertension and hyperlipdiemia. We reviewed their cardiac risk factors. He sees cardiology. They have continued to work on diet and exercise. He is consuming ice cream a few days per week. The patient has been taking medications as prescribed regularly and denies any side effects with the medication. The patient denies any chest pain or chest tightness. Denies any dyspnea upon exertion. OBJECTIVE    Blood pressure 128/84, pulse 80, temperature 97.4 °F (36.3 °C), temperature source Temporal, resp. rate 14, height 5' 4\" (1.626 m), weight 148 lb (67.1 kg), SpO2 99 %. General:  Alert, cooperative, well appearing, in no apparent distress. CV:  The heart sounds are regular in rate and rhythm. There is a normal S1 and S2. There or no murmurs. Lungs: Inspiratory and expiratory efforts are full and unlabored. Lung sounds are clear and equal to auscultation throughout all lung fields without wheezing, rales, or rhonchi. Ext: no swelling. Psych: normal affect. Mood good. Oriented x 3. Judgement and insight intact. Latest Reference Range & Units 10/10/22 08:00   WBC 4.6 - 13.2 K/uL 7.3   NRBC 0  WBC 0.0   RBC 4.35 - 5.65 M/uL 4.99   HGB 13.0 - 16.0 g/dL 13.6   HCT 36.0 - 48.0 % 42.4   MCV 78.0 - 100.0 FL 85.0   MCH 24.0 - 34.0 PG 27.3   MCHC 31.0 - 37.0 g/dL 32.1   RDW 11.6 - 14.5 % 13.0   PLATELET 165 - 585 K/uL 204   MPV 9.2 - 11.8 FL 10.3   NEUTROPHILS 40 - 73 % 52   LYMPHOCYTES 21 - 52 % 31   MONOCYTES 3 - 10 % 9   EOSINOPHILS 0 - 5 % 8 (H)   BASOPHILS 0 - 2 % 1   IMMATURE GRANULOCYTES 0.0 - 0.5 % 0   DF -   AUTOMATED   ABSOLUTE NRBC 0.00 - 0.01 K/uL 0.00   ABS. NEUTROPHILS 1.8 - 8.0 K/UL 3.8   ABS. IMM. GRANS. 0.00 - 0.04 K/UL 0.0   ABS. LYMPHOCYTES 0.9 - 3.6 K/UL 2.2   ABS. MONOCYTES 0.05 - 1.2 K/UL 0.6   ABS. EOSINOPHILS 0.0 - 0.4 K/UL 0.6 (H)   ABS.  BASOPHILS 0.0 - 0.1 K/UL 0.1   Sodium 136 - 145 mmol/L 141   Potassium 3.5 - 5.5 mmol/L 4.8   Chloride 100 - 111 mmol/L 108   CO2 21 - 32 mmol/L 28   Anion gap 3.0 - 18 mmol/L 5   Glucose 74 - 99 mg/dL 93   BUN 7.0 - 18 MG/DL 18   Creatinine 0.6 - 1.3 MG/DL 1.07   BUN/Creatinine ratio 12 - 20   17   Calcium 8.5 - 10.1 MG/DL 8.9   Bilirubin, total 0.2 - 1.0 MG/DL 0.4   Protein, total 6.4 - 8.2 g/dL 6.8   Albumin 3.4 - 5.0 g/dL 3.6   Globulin 2.0 - 4.0 g/dL 3.2   A-G Ratio 0.8 - 1.7   1.1   ALT 16 - 61 U/L 15 (L)   AST 10 - 38 U/L 20   Alk. phosphatase 45 - 117 U/L 81   Triglyceride <150 MG/ (H)   Cholesterol, total <200 MG/   HDL Cholesterol 40 - 60 MG/DL 40   CHOL/HDL Ratio 0 - 5.0   4.7   LDL, calculated 0 - 100 MG/ (H)   VLDL, calculated MG/DL 40   Prostate Specific Ag 0.0 - 4.0 ng/mL 2.5   eGFR >60 ml/min/1.73m2 >60   (H): Data is abnormally high  (L): Data is abnormally low  ASSESSMENT / PLAN    ICD-10-CM ICD-9-CM    1. Essential hypertension  I10 401.9       2. Hyperlipidemia, unspecified hyperlipidemia type  E78.5 272.4       3. Atherosclerosis of native coronary artery of native heart with angina pectoris ([facility]  I25.119 414.01      413.9       4. Encounter for immunization  Z23 V03.89 ADMIN INFLUENZA VIRUS VAC      INFLUENZA, FLUAD, (AGE 72 Y+), IM, PF, 0.5 ML        HTN - controlled. Cont per cardiology. Refills of Toprol as needed. Hyperlipidemia - uncontrolled with elevated TGs. Cont Lipitor. Cut back on ice cream.  Pt ed handout on dietary advice. Recheck annually. He will run this by his cardiologist son and his cardiologist.    CAD - cont per cardiology. Cont to modify risk factors for CAD. Flu vaccine administered. Suggested shingles vaccine. All chart history elements were reviewed by me at the time of the visit even though marked at time of note closure. Patient understands our medical plan. Patient has provided input and agrees with goals. Alternatives have been explained and offered. All questions answered. The patient is to call if condition worsens or fails to improve.      Follow-up and Dispositions    Return in about 1 year (around 10/12/2023) for routine care and Medicare Wellness exam.

## 2022-10-19 ENCOUNTER — PATIENT OUTREACH (OUTPATIENT)
Dept: CASE MANAGEMENT | Age: 72
End: 2022-10-19

## 2022-10-24 ENCOUNTER — OFFICE VISIT (OUTPATIENT)
Dept: PULMONOLOGY | Age: 72
End: 2022-10-24
Payer: MEDICARE

## 2022-10-24 VITALS
WEIGHT: 151.4 LBS | BODY MASS INDEX: 25.85 KG/M2 | DIASTOLIC BLOOD PRESSURE: 80 MMHG | RESPIRATION RATE: 18 BRPM | SYSTOLIC BLOOD PRESSURE: 130 MMHG | TEMPERATURE: 97.2 F | OXYGEN SATURATION: 98 % | HEIGHT: 64 IN | HEART RATE: 79 BPM

## 2022-10-24 DIAGNOSIS — R94.2 ABNORMAL PFT: ICD-10-CM

## 2022-10-24 DIAGNOSIS — R06.02 SOB (SHORTNESS OF BREATH) ON EXERTION: Primary | ICD-10-CM

## 2022-10-24 PROCEDURE — 99213 OFFICE O/P EST LOW 20 MIN: CPT | Performed by: INTERNAL MEDICINE

## 2022-10-24 PROCEDURE — G8427 DOCREV CUR MEDS BY ELIG CLIN: HCPCS | Performed by: INTERNAL MEDICINE

## 2022-10-24 PROCEDURE — 3017F COLORECTAL CA SCREEN DOC REV: CPT | Performed by: INTERNAL MEDICINE

## 2022-10-24 PROCEDURE — G8754 DIAS BP LESS 90: HCPCS | Performed by: INTERNAL MEDICINE

## 2022-10-24 PROCEDURE — G8417 CALC BMI ABV UP PARAM F/U: HCPCS | Performed by: INTERNAL MEDICINE

## 2022-10-24 PROCEDURE — G8752 SYS BP LESS 140: HCPCS | Performed by: INTERNAL MEDICINE

## 2022-10-24 PROCEDURE — G8536 NO DOC ELDER MAL SCRN: HCPCS | Performed by: INTERNAL MEDICINE

## 2022-10-24 PROCEDURE — 1123F ACP DISCUSS/DSCN MKR DOCD: CPT | Performed by: INTERNAL MEDICINE

## 2022-10-24 PROCEDURE — G8432 DEP SCR NOT DOC, RNG: HCPCS | Performed by: INTERNAL MEDICINE

## 2022-10-24 PROCEDURE — 1101F PT FALLS ASSESS-DOCD LE1/YR: CPT | Performed by: INTERNAL MEDICINE

## 2022-10-24 NOTE — LETTER
10/24/2022    Patient: Devora Whitfield   YOB: 1950   Date of Visit: 10/24/2022     Lyla Ramirez MD  [address]  [place]    Dear Lyla Ramirez MD,      Thank you for referring Mr. Osmel Scott to [address] [place] [address] for evaluation. My notes for this consultation are attached. If you have questions, please do not hesitate to call me. I look forward to following your patient along with you.       Sincerely,    Lidia Hernandez MD

## 2022-10-24 NOTE — PROGRESS NOTES
Yojana Mcadams presents today for   Chief Complaint   Patient presents with    Results     HRCT        Is someone accompanying this pt? No    Is the patient using any DME equipment during OV? No    -DME Company NA    Depression Screening:  3 most recent PHQ Screens 10/12/2022   Little interest or pleasure in doing things Not at all   Feeling down, depressed, irritable, or hopeless Not at all   Total Score PHQ 2 0       Learning Assessment:  Learning Assessment 9/10/2018   PRIMARY LEARNER Patient   HIGHEST LEVEL OF EDUCATION - PRIMARY LEARNER  > 4 YEARS Dorothy PRIMARY LEARNER NONE   CO-LEARNER CAREGIVER No   PRIMARY LANGUAGE ENGLISH   LEARNER PREFERENCE PRIMARY OTHER (COMMENT)     -     -   ANSWERED BY Patient   RELATIONSHIP SELF       Abuse Screening:  Abuse Screening Questionnaire 10/12/2022   Do you ever feel afraid of your partner? N   Are you in a relationship with someone who physically or mentally threatens you? N   Is it safe for you to go home? Y       Fall Risk  Fall Risk Assessment, last 12 mths 10/12/2022   Able to walk? Yes   Fall in past 12 months? 0   Do you feel unsteady? 0   Are you worried about falling 0         Coordination of Care:  1. Have you been to the ER, urgent care clinic since your last visit? Hospitalized since your last visit? No    2. Have you seen or consulted any other health care providers outside of the [address] [place] [address] since your last visit? Include any pap smears or colon screening.  No

## 2022-10-24 NOTE — PROGRESS NOTES
[facility]  Pulmonary, Critical Care, and Sleep Medicine      Pulmonary Office follow up    Name: Hui Pena     : 1950     Date: 10/24/2022        Subjective:   Patient has been referred for evaluation of: SOB on exertion. 10/24/22   Patient reports significant improvement in all his symptoms of shortness of breath  He had been using Spiriva Respimat up until 2 weeks back when he stopped using it since he was feeling significantly improved. He has not noticed any increase in symptoms since stopping Spiriva. Now able to ride his bicycle for 15 miles  Denies any cough  Denies nocturnal symptoms  No other interval health issues    HPI  Patient is a 73 y.o. male who states that when  he is exercising he has noted significant limitation at times when he feels short of breath. However recently with riding his bicycle he is not feeling the same symptoms. Patient had [phone] with structural lung involvement in 2021 with slow recovery. He was treated with Decadron with improvement. He recovered fairly well and more recently started noticing the symptoms of shortness of breath limiting his exercise routine. He denies any nocturnal symptoms  He denies any cough except for occasionally in the morning when he would have some mucus or if he is out working in his garden. He denies any wheezing  Denies chest pain or chest tightness  He has not been using any inhalers  He does have history of coronary artery disease and has been on Toprol 100 mg dosage  He had cardiac stress testing-unremarkable  Patient is a never smoker  He retired from doing retail work  Chest x-ray done in 2021 had shown bibasilar streaky infiltrates    Review of data:  I have personally reviewed all data-clinical encounters, imaging, outside test results pertinent to patient's care.   Testing:  CXR  CT scan-chest 2021  PFT-2022  Echo-2021  6 min walk-ordered today    Vaccinations:  Pneumococcal  Influenza  COVID-19 including booster      Past Medical History:   Diagnosis Date    Coronary atherosclerosis of native coronary artery     No angina, H/O stent     COVID-19     H/O colonoscopy at 53yo    patient report - unable to get records    H/O colonoscopy with polypectomy 08/2017    polyp - follow-up 3 years    H/O colonoscopy with polypectomy 08/13/2020    Dr. Bob Beyer; polyps x 3 (tubular adenoma x 2 and benign mucosal tag); diverticulosis in the sigmoid colon; repeat colonoscopy due in 3 years    HTN (hypertension)     Other and unspecified hyperlipidemia     On Lipitor    Postsurgical percutaneous transluminal coronary angioplasty status        Past Surgical History:   Procedure Laterality Date    HX CORONARY STENT PLACEMENT  2003    LAD/JENY       History reviewed. No pertinent family history. No Known Allergies    Current Outpatient Medications   Medication Sig Dispense Refill    atorvastatin (LIPITOR) 20 mg tablet TAKE 1 TABLET BY MOUTH EVERY DAY 90 Tablet 3    metoprolol succinate (TOPROL-XL) 100 mg tablet TAKE 1 TABLET BY MOUTH EVERY DAY 90 Tablet 3    cyanocobalamin (VITAMIN B-12) 1,000 mcg sublingual tablet Take 1 Tab by mouth daily. 30 Tab 11    aspirin delayed-release 81 mg tablet Take 81 mg by mouth daily.        Review of Systems:  HEENT: No epistaxis, no nasal drainage, no difficulty in swallowing, no redness in eyes  Respiratory: as above  Cardiovascular: no chest pain, no palpitations, no chronic leg edema, no syncope  Gastrointestinal: no abd pain, no vomiting, no diarrhea, no bleeding symptoms  Genitourinary: No urinary symptoms or hematuria  Integument/breast: No ulcers or rashes  Musculoskeletal:Neg  Neurological: No focal weakness, no seizures, no headaches  Behvioral/Psych: No anxiety, no depression  Constitutional: No fever, no chills, no weight loss, no night sweats     Objective:     Visit Vitals  /80 (BP 1 Location: Right upper arm, BP Patient Position: Sitting, BP Cuff Size: Adult)   Pulse 79   Temp 97.2 °F (36.2 °C) (Oral)   Resp 18   Ht 5' 4\" (1.626 m)   Wt 68.7 kg (151 lb 6.4 oz)   SpO2 98% Comment: RA Rest   BMI 25.99 kg/m²        Physical Exam:   General: comfortable, no acute distress  HEENT: pupils reactive, sclera anicteric, EOM intact  Neck: No adenopathy or thyroid swelling, no lymphadenopathy or JVD, supple  CVS: S1S2 no murmurs  RS: Mod AE bilaterally, no tactile fremitus or egophony, no accessory muscle use  Abd: soft, non tender, no hepatosplenomegaly  Neuro: non focal, awake, alert  Extrm: no leg edema, clubbing or cyanosis  Skin: no rash    Data review:   Pertinent labs: CBC, BMP, LFT's        PFT:      Date FVC FEV1  FEV1/FVC DCV06-55 TLC  FRC RV/TLC VC DLCO   5/26/2022  76  77  reduced  67   70    51/70                                               Results for orders placed or performed during the hospital encounter of 02/23/21   EKG, 12 LEAD, INITIAL   Result Value Ref Range    Ventricular Rate 72 BPM    Atrial Rate 72 BPM    P-R Interval 138 ms    QRS Duration 80 ms    Q-T Interval 364 ms    QTC Calculation (Bezet) 398 ms    Calculated P Axis 33 degrees    Calculated R Axis -15 degrees    Calculated T Axis 45 degrees    Diagnosis       Normal sinus rhythm  Normal ECG  No previous ECGs available  Confirmed by Eugenia Stevenson ([phone]) on 2/23/2021 12:12:07 PM         Imaging:  I have personally reviewed the patients radiographs and have reviewed the reports:  XR Results (most recent):  Results from Hospital Encounter encounter on 02/23/21    XR CHEST PORT    Narrative  Portable CXR:    HISTORY: Hypotension. Positive Covid test.    Comparison 2/16/2021    Cardiac silhouette is normal in size. No vascular congestion. Mild patchy  density in the lung bases, slightly less severe than before with better  expansion of the lungs. No pleural effusion. Impression  Minimal bilateral basilar infiltrate/atelectasis.     CT Results (most recent):  Results from [place] encounter on 06/14/22    CT CHEST HIGH RES WO CONT    Narrative  HIGH RESOLUTION CT CHEST    CPT CODE: 37269    COMPARISON: 2/23/2021. INDICATIONS: Interstitial lung disease. TECHNIQUE: Axial scanning of the chest is performed with high resolution thin  sections in both inspiration and expiration. Additional inspiratory prone images  were performed. All CT scans at this facility are performed using dose optimization technique as  appropriate to a performed exam, to include automated exposure control,  adjustment of the mA and/or kV according to patient size (including appropriate  matching first site-specific examinations), or use of iterative reconstruction  technique. FINDINGS:  Thyroid: Unremarkable in its visualized aspects. Pericardium/ Heart: No significant effusion. LAD stent. Aorta/ Vessels: Aorta is normal in caliber. .  Lymph Nodes: Unremarkable. .    Lungs: Resolved prior peripheral opacities. Similar mild calcified granulomata. No air trapping. No reticulations or cysts. No change with prone imaging. Pleura: No effusion. Upper Abdomen: Unremarkable. Bones/soft tissues: Osteopenia. Degenerative disc disease. Impression  Resolved multifocal pneumonia. No findings of interstitial lung disease. No air trapping. .     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 Follow-up imaging with HRCT did not show any structural abnormalities.   Clinical symptom resolution also noted with patient now able to exercise without limitation. History of CAD s/p PTCA  S/p COVID-19 with bilateral pneumonia-February 2021  Essential hypertension      RECOMMENDATIONS:   Discussed with patient findings on PFT and documented reversible airway obstruction. Will de-escalate therapy since clinically he is doing well and likely airway reactivity and obstruction was post COVID. Will watch off Spiriva and resume if symptoms recur  Discussed findings of HRCT  Encourage incremental and continued physical activity  Maintain active lifestyle and healthy weight  Preventive [id number]-kv-ot-date  Will follow up in 6 months with repeat PFTs to document improvement     Health maintenance screens deferred to Primary care provider. Nasir Grant MD    This patient has a high complexity chronic care condition   This Visit needed Moderate complexity medically necessary decision making and management plans. Please note that this dictation was completed with [employer], the computer voice recognition software. Quite often unanticipated grammatical, syntax, homophones, and other interpretive errors are inadvertently transcribed by the computer software. Please disregard these errors. Please excuse any errors that have escaped final proofreading.

## 2022-10-26 ENCOUNTER — PATIENT OUTREACH (OUTPATIENT)
Dept: CASE MANAGEMENT | Age: 72
End: 2022-10-26

## 2022-12-27 RX ORDER — METOPROLOL SUCCINATE 100 MG/1
TABLET, EXTENDED RELEASE ORAL
Qty: 90 TABLET | Refills: 3 | OUTPATIENT
Start: 2022-12-27

## 2022-12-29 NOTE — TELEPHONE ENCOUNTER
Called patient to let him know we need to make appointment for med refills. Patient mailbox was full not able to leave message.

## 2023-02-17 DIAGNOSIS — E78.5 HYPERLIPIDEMIA, UNSPECIFIED: ICD-10-CM

## 2023-02-17 RX ORDER — ATORVASTATIN CALCIUM 20 MG/1
TABLET, FILM COATED ORAL
Qty: 90 TABLET | Refills: 3 | Status: SHIPPED | OUTPATIENT
Start: 2023-02-17

## 2023-03-02 ENCOUNTER — TELEPHONE (OUTPATIENT)
Age: 73
End: 2023-03-02

## 2023-03-03 NOTE — PROGRESS NOTES
1. \"Have you been to the ER, urgent care clinic since your last visit? Hospitalized since your last visit? \" No    2. \"Have you seen or consulted any other health care providers outside of the [address] [place] [address] since your last visit? \" Yes Where: Dr. Norris Garland      3. For patients aged 39-70: Has the patient had a colonoscopy / FIT/ Cologuard? Yes - no Care Gap present-due 8/2024      If the patient is female:    4. For patients aged 41-77: Has the patient had a mammogram within the past 2 years? NA - based on age or sex      11. For patients aged 21-65: Has the patient had a pap smear?  NA - based on age or sex

## 2023-03-06 ENCOUNTER — OFFICE VISIT (OUTPATIENT)
Age: 73
End: 2023-03-06
Payer: MEDICARE

## 2023-03-06 VITALS
RESPIRATION RATE: 14 BRPM | SYSTOLIC BLOOD PRESSURE: 138 MMHG | TEMPERATURE: 98.1 F | BODY MASS INDEX: 26.12 KG/M2 | HEART RATE: 62 BPM | DIASTOLIC BLOOD PRESSURE: 88 MMHG | OXYGEN SATURATION: 100 % | HEIGHT: 64 IN | WEIGHT: 153 LBS

## 2023-03-06 DIAGNOSIS — H25.9 AGE-RELATED CATARACT OF BOTH EYES, UNSPECIFIED AGE-RELATED CATARACT TYPE: Primary | ICD-10-CM

## 2023-03-06 DIAGNOSIS — Z01.818 PREOPERATIVE EXAMINATION: ICD-10-CM

## 2023-03-06 DIAGNOSIS — I10 ESSENTIAL (PRIMARY) HYPERTENSION: ICD-10-CM

## 2023-03-06 DIAGNOSIS — I25.119 ATHEROSCLEROSIS OF NATIVE CORONARY ARTERY OF NATIVE HEART WITH ANGINA PECTORIS (HCC): ICD-10-CM

## 2023-03-06 PROCEDURE — 1036F TOBACCO NON-USER: CPT | Performed by: FAMILY MEDICINE

## 2023-03-06 PROCEDURE — 1123F ACP DISCUSS/DSCN MKR DOCD: CPT | Performed by: FAMILY MEDICINE

## 2023-03-06 PROCEDURE — G8427 DOCREV CUR MEDS BY ELIG CLIN: HCPCS | Performed by: FAMILY MEDICINE

## 2023-03-06 PROCEDURE — G8417 CALC BMI ABV UP PARAM F/U: HCPCS | Performed by: FAMILY MEDICINE

## 2023-03-06 PROCEDURE — 3079F DIAST BP 80-89 MM HG: CPT | Performed by: FAMILY MEDICINE

## 2023-03-06 PROCEDURE — 3017F COLORECTAL CA SCREEN DOC REV: CPT | Performed by: FAMILY MEDICINE

## 2023-03-06 PROCEDURE — 3075F SYST BP GE 130 - 139MM HG: CPT | Performed by: FAMILY MEDICINE

## 2023-03-06 PROCEDURE — G8484 FLU IMMUNIZE NO ADMIN: HCPCS | Performed by: FAMILY MEDICINE

## 2023-03-06 PROCEDURE — 99213 OFFICE O/P EST LOW 20 MIN: CPT | Performed by: FAMILY MEDICINE

## 2023-03-06 RX ORDER — METOPROLOL SUCCINATE 100 MG/1
TABLET, EXTENDED RELEASE ORAL
COMMUNITY
Start: 2021-12-20

## 2023-03-06 RX ORDER — ASPIRIN 81 MG/1
81 TABLET ORAL DAILY
COMMUNITY

## 2023-03-06 RX ORDER — METOPROLOL SUCCINATE 50 MG/1
50 TABLET, EXTENDED RELEASE ORAL DAILY
COMMUNITY
Start: 2017-07-25 | End: 2023-03-06 | Stop reason: CLARIF

## 2023-03-06 SDOH — ECONOMIC STABILITY: FOOD INSECURITY: WITHIN THE PAST 12 MONTHS, YOU WORRIED THAT YOUR FOOD WOULD RUN OUT BEFORE YOU GOT MONEY TO BUY MORE.: OFTEN TRUE

## 2023-03-06 SDOH — ECONOMIC STABILITY: INCOME INSECURITY: HOW HARD IS IT FOR YOU TO PAY FOR THE VERY BASICS LIKE FOOD, HOUSING, MEDICAL CARE, AND HEATING?: HARD

## 2023-03-06 SDOH — ECONOMIC STABILITY: HOUSING INSECURITY
IN THE LAST 12 MONTHS, WAS THERE A TIME WHEN YOU DID NOT HAVE A STEADY PLACE TO SLEEP OR SLEPT IN A SHELTER (INCLUDING NOW)?: PATIENT REFUSED

## 2023-03-06 SDOH — ECONOMIC STABILITY: FOOD INSECURITY: WITHIN THE PAST 12 MONTHS, THE FOOD YOU BOUGHT JUST DIDN'T LAST AND YOU DIDN'T HAVE MONEY TO GET MORE.: OFTEN TRUE

## 2023-03-06 ASSESSMENT — PATIENT HEALTH QUESTIONNAIRE - PHQ9
SUM OF ALL RESPONSES TO PHQ QUESTIONS 1-9: 0
1. LITTLE INTEREST OR PLEASURE IN DOING THINGS: 0
2. FEELING DOWN, DEPRESSED OR HOPELESS: 0
SUM OF ALL RESPONSES TO PHQ9 QUESTIONS 1 & 2: 0

## 2023-03-06 NOTE — PROGRESS NOTES
Subjective:     Chief Complaint   Patient presents with    Pre-op Exam     pre op exam for cataract surgery to be performed on 3/15/2023 @ [address] by Dr. Nathalia Gilliam under topical anesthesia      Nae Stinson is a 81 y.o. male who presents for evaluation of pre-op exam for cataract surgery. Past Medical History:   Diagnosis Date    Coronary atherosclerosis of native coronary artery     No angina, H/O stent     COVID-19     H/O colonoscopy at 51yo    patient report - unable to get records    H/O colonoscopy with polypectomy 08/13/2020    Dr. Dulce Velez; polyps x 3 (tubular adenoma x 2 and benign mucosal tag); diverticulosis in the sigmoid colon; repeat colonoscopy due in 3 years    H/O colonoscopy with polypectomy 08/2017    polyp - follow-up 3 years    HTN (hypertension)     Other and unspecified hyperlipidemia     On Lipitor    Postsurgical percutaneous transluminal coronary angioplasty status      Past Surgical History:   Procedure Laterality Date    CORONARY ANGIOPLASTY WITH STENT PLACEMENT  2003    LAD/NIKKY     History reviewed. No pertinent family history. Social History     Socioeconomic History    Marital status:      Spouse name: None    Number of children: None    Years of education: None    Highest education level: None   Tobacco Use    Smoking status: Never    Smokeless tobacco: Never   Substance and Sexual Activity    Alcohol use:  Yes     Alcohol/week: 0.0 standard drinks    Drug use: No     Social Determinants of Health     Financial Resource Strain: High Risk    Difficulty of Paying Living Expenses: Hard   Food Insecurity: Food Insecurity Present    Worried About Running Out of Food in the Last Year: Often true    Ran Out of Food in the Last Year: Often true   Transportation Needs: Unknown    Lack of Transportation (Non-Medical): Patient refused   Housing Stability: Unknown    Unstable Housing in the Last Year: Patient refused     Current Outpatient Medications   Medication Sig Dispense Refill    aspirin 81 MG EC tablet Take 81 mg by mouth daily      Cyanocobalamin 1000 MCG SUBL Take 1,000 mcg by mouth daily      metoprolol succinate (TOPROL XL) 100 MG extended release tablet TAKE 1 TABLET BY MOUTH EVERY DAY      atorvastatin (LIPITOR) 20 MG tablet TAKE 1 TABLET BY MOUTH EVERY DAY 90 tablet 3     No current facility-administered medications for this visit. No Known Allergies    Review of Systems  A comprehensive review of systems was negative. Objective:      /88 (Site: Left Upper Arm)   Pulse 62   Temp 98.1 °F (36.7 °C) (Temporal)   Resp 14   Ht 5' 4\" (1.626 m)   Wt 153 lb (69.4 kg)   SpO2 100%   BMI 26.26 kg/m²     General:  alert, appears stated age, and cooperative   Skin:  normal   Eyes: conjunctivae/corneas clear. PERRL, EOM's intact. Fundi benign. Mouth: MMM no lesions   Lymph Nodes:  No cervical LAD   Lungs:  clear to auscultation bilaterally   Heart:  regular rate and rhythm, S1, S2 normal, no murmur, click, rub or gallop   Abdomen: soft, non-tender; bowel sounds normal; no masses,  no organomegaly   Extremities:    Neurologic:     extremities normal, atraumatic, no cyanosis or edema   Alert and oriented x3. Gait normal. Reflexes and motor strength normal and symmetric. Cranial nerves 2-12 and sensation grossly intact. Assessment:         Diagnosis Orders   1. Age-related cataract of both eyes, unspecified age-related cataract type        2. Preoperative examination        3. Essential (primary) hypertension        4. Atherosclerosis of native coronary artery of native heart with angina pectoris ([facility]              Plan:     Cataracts - cleared for surgery with paperwork filled out and sent. HTN / CAD  - mildly prehypertensive. Overall has been controlled. Cont per cardiology. All chart history elements were reviewed by me at the time of the visit even though marked at time of note closure. Patient understands our medical plan.  Patient has provided input and agrees with goals. Alternatives have been explained and offered. All questions answered. The patient is to call if condition worsens or fails to improve.      Return October 2023 for routine care and annual Medicare Wellness exam.

## 2023-03-06 NOTE — PATIENT INSTRUCTIONS
HOUSING RESOURCES    The [facility] Financial for [facility])  [address], [address] [facility], [address] [phone]  Helpful Info: Works with over 52 houses of [place] in [place], [place], [place], and students from [address]. Provides nightly shelter for single homeless persons from mid-November through the first week of May each year. [address] with other organizations to utilize support services to move the homeless person from dependency to self-sufficiency. [facility]  Triston Viramontes Eriberto Javed  [phone]  Helpful Info: Long-term transitional housing program for single and single pregnant women. They provide comprehensive support services, transitional housing, and skills to prevent and break the cycle of homelessness. [address] ([place] [address]  [phone]  Helpful Info: Open 24/7, 365 days a year. Emergency shelter for Men, Single Women, and Women with children. Residents receive 3 meals a day, clothing, toiletries, shower and laundry services, case management, and counseling. [facility] [address] and [address]  [address], [address] [place] [address] Rd, [facility]  [phone]  Helpful Info: Referrals are ONLY accepted through the [employer] ([phone]). Limited space/beds available. Their program provides short-term subsidized shelter and includes intensive case management. [address] [facility] 46 411 [facility], [address] [place]   [phone]  Helpful Info: Provide emergency shelter to families and children. The crisis line is the central point of contact for all persons experiencing a housing crisis throughout [address] [place] [address]. Need additional resources? Call 211 or Find Help: https://www. findhelp.org/FOOD RESOURCES    [facility]  What they offer: Assistance with finding a food pantry, soup kitchen or resource near you. Website: https://BioTime.org/get-help/community-resources/  Provide instructions for assistance with Kathrin Tesfaye (Food [place]) application on this site. SNAP ([address])  What they offer: SNAP is used like cash to buy eligible food items from authorized retailers. Apply for benefits online:   Apply for benefits by telephone: [phone]    Meals on Wheels  What they offer: Meals on Wheels is a program that delivers meals to individuals at home who are unable to purchase or prepare their own meals. Website: https://GlobalView Software. org/how-we-help/meals-on-wheels/  Requirements can vary by program and areas served. To apply:  [address] [place] [address] Avenue: [phone] (JAT -Fri 8:30 a.m. to 4:30 p.m.)  Coverage Area:  [address] [place] [address] 6 [facility],[address]  Website: www.Cox Bransona.org/contact-us/  [phone]   Coverage Areas: [address] [place] [address], [address] [place] [address]. [facility] 96  What they offer:  [facility]asis provides comprehensive services to the disadvantaged/low-income community, homebound seniors and homeless in [facility]. Phone Number: [phone]  Services:  Food pantry, [facility] [address] [place] kitchen, Senior Grocery Delivery Program, Food Bank, Provides assistance with completing [employer]. Website: https://www.Solutionreach/. org/services    Need additional resources? Call 211 or Find Help: https://www. findhelp.org/FINANCIAL RESOURCES    [employer]  What they offer: The [employer] Program helps uninsured patients who do not qualify for government-sponsored health insurance and cannot afford to pay for their medical care.  Insured patient may also qualify for assistance based on family income, family size, and medical needs. Phone Number: [phone]  How to apply for the [facility] Program:            #    Option 1: To apply for financial assistance, a patient (or their family or other provider) should fill out the Financial Assistance Application. Copies of the Financial Assistance Application and the FAP may be obtained for free by calling the [employer] customer service department at [phone]. #    Option 2:  The Financial Assistance Application and policy may be obtained for free by downloading a copy from the Eyepic:                     FilterEasy/patient-resources/financial-assistance                     #   Applications are available in several languages on the website    The [employer]. [place] [employer]. org  What they offer: The [facility] Program can assist you if youre uninsured and have been diagnosed with chronic, debilitating, or life-threatening disease. Phone Number: [phone]  Website: www. Logopro    UTILITIES    CommonHelp  What they offer: Partnership with the [place] [address]. Assist with   finding and applying for government funded programs and benefits. You can also update  your benefits or report changes through The Bouqs Companyp. Website: TranStar Racing.MCE-5 Development  Phone Number: 703-4OGHBTF ([phone])      [address] they offer: Kitty Burns is [employer]s energy assistance program of last resort for                 anyone who faces financial hardships from unemployment or family crisis. o Phone Number: [phone]        o Address: [address] [place], [address] [facility]        o Website: CosentialYazminVasona Networks/[place]/billing/billing-options/energyshare     Energy Assistance Programs (EAP) - Department of           o What they offer: EAP assists low income households in meeting their immediate home energy needs. o Phone Number: [phone] or contact your local department of         o Website: https://DateMyFamily.com.Kaboodle/     Good Rx   o What they offer: Good Rx tracks prescription drug prices and provides free drug coupons for discounts on medications. o Website: Dimmi/    NeedyMeds   o What they offer: NeedyMeds offers free information on medications and healthcare cost savings programs including prescription assistance programs, coupons, and discount programs. o Website: PaymentBack.rumr org/   o Helpline: 272.653.1178     RX Assist   o What they offer: Information about free and low-cost medicine programs. o Website: https://Displair/    [employer] $4 Prescription Program   o What they offer: Prescription Program includes up to a 30-day supply for $4 and a 90-day supply for $10 of some covered generic drugs at commonly prescribed dosages   o Website: Templafyde      The Co-Pay Relief Program  What they offer: The Co-Pay Relief Program provides you with direct prescription co-payment assistance, if you are an insured U.[place] citizen and financially and medically qualify, including Medicare Part D beneficiaries who require assistance with their prescription drug co-payments. Phone toll-free: 3-534.629.4464  Website: www.Memeoirs. org    The Partnership for Prescription Assistance   What they offer: The Partnership for Prescription Assistance can help you if you lack Prescription coverage to get the medicines you need through the public or private program that is right for you. Phone toll-free: 9-519.741.8039  Website:  www. Halo Beveragesx. [address]  o    What they offer:    The [address] [place] [address] gives you and your family access to a free Prescription Drug Card program and savings of up to 75% at more than 50,000 national and The Beestar. Phone toll-free: 4-824.994.9439  Website: www.Solar Power Technologies    additional resources? Call 211 or Find Help: https://www. StackIQp.org/ TRANSPORTATION RESOURCES    Senior Services [address]  What they offer: [employer] of [facility] Arria NLG [place] offers fixed routes, medical transportation, and on-demand response transit. Accepts donations  Fare: $1.00 each way  Phone Number: [phone]  Website: http://AMVONET.Pressglue. org    Principal Financial  What they offer: In compliance with the [facility], [facility] provides a shared ride, advanced reservation, origin to destination service for disabled individuals who are unable to use regular fixed route public transportation services because of their disabilities. Phone Number: [phone]  Apply online: http://Moni Technologies. com/TransitAgencyChoice. aspx or http://montserratRumgr. com    Medicare Advantage Plans  Some plans may provide transportation. Members should contact the Member Services or Transportation number on the back of their insurance card for more information or to schedule transportation. Medicaid Plans - [address] [facility]) Plus     Each health plan has options for transportation services. Contact your insurance provider to schedule transportation. Transportation or Member Services contact information is listed on the back of the insurance card. Nhi [address] [place] [address]   Phone: [phone]   Website:  Ringostat    o [facility] Health[facility] Plus   Phone: 0-933.902.1437   Website: Doug     o [facility]   Phone: [phone]   Website: https://www. [address] [place], [place] [address]lock [place]. [facility] [address]  Phone: 3-968.907.2414 or 3-203.507.6749   Website: Seda    o [employer] Phone: 4-774.455.3445   Website: http://www.Timeshare Broker Sales/     o [facility]   Phone: [phone]   Website: Weroom    Need additional resources? Call 211 or visit Find Help:  https://www. findhelp.org/

## 2023-08-18 DIAGNOSIS — I10 ESSENTIAL (PRIMARY) HYPERTENSION: ICD-10-CM

## 2023-08-18 DIAGNOSIS — E78.5 HYPERLIPIDEMIA, UNSPECIFIED: ICD-10-CM

## 2023-08-18 RX ORDER — ATORVASTATIN CALCIUM 20 MG/1
20 TABLET, FILM COATED ORAL DAILY
Qty: 90 TABLET | Refills: 3 | Status: SHIPPED | OUTPATIENT
Start: 2023-08-18 | End: 2024-08-12

## 2023-08-18 RX ORDER — METOPROLOL SUCCINATE 100 MG/1
100 TABLET, EXTENDED RELEASE ORAL DAILY
Qty: 90 TABLET | Refills: 3 | Status: SHIPPED | OUTPATIENT
Start: 2023-08-18

## 2023-08-18 NOTE — TELEPHONE ENCOUNTER
Requested Prescriptions     Pending Prescriptions Disp Refills    metoprolol succinate (TOPROL XL) 100 MG extended release tablet 90 tablet 3     Sig: Take 1 tablet by mouth daily    atorvastatin (LIPITOR) 20 MG tablet 90 tablet 3     Sig: Take 1 tablet by mouth daily

## 2023-10-02 NOTE — PROGRESS NOTES
Chief Complaint   Patient presents with    Medicare AWV      1. \"Have you been to the ER, urgent care clinic since your last visit? Hospitalized since your last visit? \" No    2. \"Have you seen or consulted any other health care providers outside of the [address] [place] [address] since your last visit? \" No     3. For patients aged 43-73: Has the patient had a colonoscopy / FIT/ Cologuard?  Yes - no Care Gap present due 08/13/2024

## 2023-10-02 NOTE — PATIENT INSTRUCTIONS
Patient Education        Well Visit, Over 72: Care Instructions  Well visits can help you stay healthy. Your doctor has checked your overall health and may have suggested ways to take good care of yourself. Your doctor also may have recommended tests. You can help prevent illness with healthy eating, good sleep, vaccinations, regular exercise, and other steps. Get the tests that you and your doctor decide on. Depending on your age and risks, examples might include hearing tests as well as screening for colon, breast, and lung cancer. Screening helps find diseases before any symptoms appear. Eat healthy foods. Choose fruits, vegetables, whole grains, lean protein, and low-fat dairy foods. Limit saturated fat, and reduce salt. Limit alcohol. Men should have no more than 2 drinks a day. Women should have no more than 1. For some people, no alcohol is the best choice. Exercise. It can help prevent falls. Get at least 30 minutes of exercise on most days of the week. Walking, yoga, and betsy chi can be good choices. Reach and stay at your healthy weight. This will lower your risk for many health problems. Take care of your mental health. Try to stay connected with friends, family, and community, and find ways to manage stress. If you're feeling depressed or hopeless, talk to someone. A counselor can help. If you don't have a counselor, talk to your doctor. Talk to your doctor if you think you may have a problem with alcohol or drug use. This includes prescription medicines and illegal drugs. Avoid tobacco and nicotine: Don't smoke, vape, or chew. If you need help quitting, talk to your doctor. Practice safer sex. Getting tested, using condoms or dental dams, and limiting sex partners can help prevent STIs. Make an advance directive. This is a legal way to tell your family and doctor what you want to happen at the end of your life or when you can't speak for yourself.    Prevent problems where

## 2023-10-03 ENCOUNTER — OFFICE VISIT (OUTPATIENT)
Age: 73
End: 2023-10-03
Payer: MEDICARE

## 2023-10-03 VITALS
BODY MASS INDEX: 26.21 KG/M2 | TEMPERATURE: 98.2 F | HEIGHT: 64 IN | BODY MASS INDEX: 26.21 KG/M2 | WEIGHT: 153.5 LBS | DIASTOLIC BLOOD PRESSURE: 84 MMHG | HEART RATE: 58 BPM | RESPIRATION RATE: 16 BRPM | DIASTOLIC BLOOD PRESSURE: 84 MMHG | HEIGHT: 64 IN | TEMPERATURE: 98.2 F | RESPIRATION RATE: 16 BRPM | OXYGEN SATURATION: 99 % | SYSTOLIC BLOOD PRESSURE: 126 MMHG | WEIGHT: 153.5 LBS | SYSTOLIC BLOOD PRESSURE: 126 MMHG

## 2023-10-03 DIAGNOSIS — I10 ESSENTIAL (PRIMARY) HYPERTENSION: Primary | ICD-10-CM

## 2023-10-03 DIAGNOSIS — Z71.89 ACP (ADVANCE CARE PLANNING): ICD-10-CM

## 2023-10-03 DIAGNOSIS — E78.5 HYPERLIPIDEMIA, UNSPECIFIED HYPERLIPIDEMIA TYPE: ICD-10-CM

## 2023-10-03 DIAGNOSIS — Z12.5 ENCOUNTER FOR SCREENING FOR MALIGNANT NEOPLASM OF PROSTATE: ICD-10-CM

## 2023-10-03 DIAGNOSIS — Z23 ENCOUNTER FOR IMMUNIZATION: ICD-10-CM

## 2023-10-03 DIAGNOSIS — I25.83 CORONARY ARTERY DISEASE DUE TO LIPID RICH PLAQUE: ICD-10-CM

## 2023-10-03 DIAGNOSIS — I25.10 CORONARY ARTERY DISEASE DUE TO LIPID RICH PLAQUE: ICD-10-CM

## 2023-10-03 DIAGNOSIS — Z00.00 MEDICARE ANNUAL WELLNESS VISIT, SUBSEQUENT: Primary | ICD-10-CM

## 2023-10-03 PROBLEM — J84.9 INTERSTITIAL PULMONARY DISEASE, UNSPECIFIED (HCC): Status: ACTIVE | Noted: 2023-10-03

## 2023-10-03 PROBLEM — J84.9 INTERSTITIAL PULMONARY DISEASE, UNSPECIFIED (HCC): Status: RESOLVED | Noted: 2023-10-03 | Resolved: 2023-10-03

## 2023-10-03 PROCEDURE — PBSHW INFLUENZA, FLUAD, (AGE 65 Y+), IM, PF, 0.5 ML: Performed by: FAMILY MEDICINE

## 2023-10-03 PROCEDURE — 90694 VACC AIIV4 NO PRSRV 0.5ML IM: CPT | Performed by: FAMILY MEDICINE

## 2023-10-03 PROCEDURE — 3074F SYST BP LT 130 MM HG: CPT | Performed by: FAMILY MEDICINE

## 2023-10-03 PROCEDURE — 1123F ACP DISCUSS/DSCN MKR DOCD: CPT | Performed by: FAMILY MEDICINE

## 2023-10-03 PROCEDURE — 99497 ADVNCD CARE PLAN 30 MIN: CPT | Performed by: FAMILY MEDICINE

## 2023-10-03 PROCEDURE — G0439 PPPS, SUBSEQ VISIT: HCPCS | Performed by: FAMILY MEDICINE

## 2023-10-03 PROCEDURE — 3079F DIAST BP 80-89 MM HG: CPT | Performed by: FAMILY MEDICINE

## 2023-10-03 PROCEDURE — G8417 CALC BMI ABV UP PARAM F/U: HCPCS | Performed by: FAMILY MEDICINE

## 2023-10-03 PROCEDURE — 1036F TOBACCO NON-USER: CPT | Performed by: FAMILY MEDICINE

## 2023-10-03 PROCEDURE — 99213 OFFICE O/P EST LOW 20 MIN: CPT | Performed by: FAMILY MEDICINE

## 2023-10-03 PROCEDURE — G8427 DOCREV CUR MEDS BY ELIG CLIN: HCPCS | Performed by: FAMILY MEDICINE

## 2023-10-03 PROCEDURE — 3017F COLORECTAL CA SCREEN DOC REV: CPT | Performed by: FAMILY MEDICINE

## 2023-10-03 PROCEDURE — G8484 FLU IMMUNIZE NO ADMIN: HCPCS | Performed by: FAMILY MEDICINE

## 2023-10-03 ASSESSMENT — PATIENT HEALTH QUESTIONNAIRE - PHQ9
3. TROUBLE FALLING OR STAYING ASLEEP: 0
SUM OF ALL RESPONSES TO PHQ QUESTIONS 1-9: 0
4. FEELING TIRED OR HAVING LITTLE ENERGY: 0
1. LITTLE INTEREST OR PLEASURE IN DOING THINGS: 0
SUM OF ALL RESPONSES TO PHQ9 QUESTIONS 1 & 2: 0
SUM OF ALL RESPONSES TO PHQ QUESTIONS 1-9: 0
8. MOVING OR SPEAKING SO SLOWLY THAT OTHER PEOPLE COULD HAVE NOTICED. OR THE OPPOSITE, BEING SO FIGETY OR RESTLESS THAT YOU HAVE BEEN MOVING AROUND A LOT MORE THAN USUAL: 0
SUM OF ALL RESPONSES TO PHQ QUESTIONS 1-9: 0
SUM OF ALL RESPONSES TO PHQ QUESTIONS 1-9: 0
6. FEELING BAD ABOUT YOURSELF - OR THAT YOU ARE A FAILURE OR HAVE LET YOURSELF OR YOUR FAMILY DOWN: 0
10. IF YOU CHECKED OFF ANY PROBLEMS, HOW DIFFICULT HAVE THESE PROBLEMS MADE IT FOR YOU TO DO YOUR WORK, TAKE CARE OF THINGS AT HOME, OR GET ALONG WITH OTHER PEOPLE: 0
2. FEELING DOWN, DEPRESSED OR HOPELESS: 0
9. THOUGHTS THAT YOU WOULD BE BETTER OFF DEAD, OR OF HURTING YOURSELF: 0
5. POOR APPETITE OR OVEREATING: 0
7. TROUBLE CONCENTRATING ON THINGS, SUCH AS READING THE NEWSPAPER OR WATCHING TELEVISION: 0

## 2023-10-03 ASSESSMENT — LIFESTYLE VARIABLES
HOW OFTEN DO YOU HAVE A DRINK CONTAINING ALCOHOL: MONTHLY OR LESS
HOW MANY STANDARD DRINKS CONTAINING ALCOHOL DO YOU HAVE ON A TYPICAL DAY: 1 OR 2

## 2023-10-03 NOTE — PROGRESS NOTES
Chief Complaint   Patient presents with    Cholesterol Problem    Coronary Artery Disease    Hypertension        1. \"Have you been to the ER, urgent care clinic since your last visit? Hospitalized since your last visit? \" No    2. \"Have you seen or consulted any other health care providers outside of the [address] [place] [address] since your last visit? \" No     3. For patients aged 43-73: Has the patient had a colonoscopy / FIT/ Cologuard? Yes - no Care Gap present due 08/13/2024    Physician order obtained. Patient completed adult immunization consent form. Allergies, contraindications and recommendations reviewed with patient. Seasonal influenza vaccine administered IM right deltoid. Patient tolerated well. Patient remained in office for 15 minutes after injection and no adverse reactions were noted.      Lot # [id number]  Exp Date: 06/30/2024  [facility] # [id number]

## 2023-10-03 NOTE — ACP (ADVANCE CARE PLANNING)
Advance Care Planning    General Advance Care Planning (ACP) Conversation      Date of Conversation: 10/3/2023  Conducted with: Patient with Decision Making Capacity    Healthcare Decision Maker:     Primary Decision Maker: Ana Marino - Spouse - [phone]      Content/Action Overview:   Has NO ACP documents/care preferences - referred to ACP Clinical Specialist once again  Reviewed DNR/DNI and patient elects Full Code (Attempt Resuscitation)  Topics discussed: treatment goals, benefit/burden of treatment options, artificial nutrition, ventilation preferences, hospitalization preferences, resuscitation preferences, end of life care preferences (vegetative state/imminent death), and hospice care. His son is a physician but not local.  He is advised to complete an ACP and share it [facility]. He is advised we will review this annually. Provided ACP paperwork as well.         Length of Voluntary ACP Conversation in minutes:  16 minutes    Mian Wellington MD

## 2023-10-04 ENCOUNTER — CLINICAL DOCUMENTATION (OUTPATIENT)
Dept: SPIRITUAL SERVICES | Age: 73
End: 2023-10-04

## 2023-10-04 ENCOUNTER — HOSPITAL ENCOUNTER (OUTPATIENT)
Facility: HOSPITAL | Age: 73
Discharge: HOME | End: 2023-10-07
Payer: MEDICARE

## 2023-10-04 DIAGNOSIS — E78.5 HYPERLIPIDEMIA, UNSPECIFIED HYPERLIPIDEMIA TYPE: ICD-10-CM

## 2023-10-04 DIAGNOSIS — I10 ESSENTIAL (PRIMARY) HYPERTENSION: ICD-10-CM

## 2023-10-04 DIAGNOSIS — Z12.5 ENCOUNTER FOR SCREENING FOR MALIGNANT NEOPLASM OF PROSTATE: ICD-10-CM

## 2023-10-04 LAB
ALBUMIN SERPL-MCNC: 3.5 G/DL (ref 3.4–5)
ALBUMIN/GLOB SERPL: 1 (ref 0.8–1.7)
ALP SERPL-CCNC: 92 U/L (ref 45–117)
ALT SERPL-CCNC: 16 U/L (ref 16–61)
ANION GAP SERPL CALC-SCNC: 3 MMOL/L (ref 3–18)
AST SERPL-CCNC: 20 U/L (ref 10–38)
BILIRUB SERPL-MCNC: 0.7 MG/DL (ref 0.2–1)
BUN SERPL-MCNC: 21 MG/DL (ref 7–18)
BUN/CREAT SERPL: 17 (ref 12–20)
CALCIUM SERPL-MCNC: 8.7 MG/DL (ref 8.5–10.1)
CHLORIDE SERPL-SCNC: 107 MMOL/L (ref 100–111)
CHOLEST SERPL-MCNC: 172 MG/DL
CO2 SERPL-SCNC: 27 MMOL/L (ref 21–32)
CREAT SERPL-MCNC: 1.23 MG/DL (ref 0.6–1.3)
ERYTHROCYTE [DISTWIDTH] IN BLOOD BY AUTOMATED COUNT: 13.8 % (ref 11.6–14.5)
GLOBULIN SER CALC-MCNC: 3.5 G/DL (ref 2–4)
GLUCOSE SERPL-MCNC: 89 MG/DL (ref 74–99)
HCT VFR BLD AUTO: 41.6 % (ref 36–48)
HDLC SERPL-MCNC: 44 MG/DL (ref 40–60)
HDLC SERPL: 3.9 (ref 0–5)
HGB BLD-MCNC: 13.4 G/DL (ref 13–16)
LDLC SERPL CALC-MCNC: 102.8 MG/DL (ref 0–100)
LIPID PANEL: ABNORMAL
MCH RBC QN AUTO: 27.9 PG (ref 24–34)
MCHC RBC AUTO-ENTMCNC: 32.2 G/DL (ref 31–37)
MCV RBC AUTO: 86.5 FL (ref 78–100)
NRBC # BLD: 0 K/UL (ref 0–0.01)
NRBC BLD-RTO: 0 PER 100 WBC
PLATELET # BLD AUTO: 202 K/UL (ref 135–420)
PMV BLD AUTO: 10.5 FL (ref 9.2–11.8)
POTASSIUM SERPL-SCNC: 4.7 MMOL/L (ref 3.5–5.5)
PROT SERPL-MCNC: 7 G/DL (ref 6.4–8.2)
PSA SERPL-MCNC: 2.4 NG/ML (ref 0–4)
RBC # BLD AUTO: 4.81 M/UL (ref 4.35–5.65)
SODIUM SERPL-SCNC: 137 MMOL/L (ref 136–145)
TRIGL SERPL-MCNC: 126 MG/DL
VLDLC SERPL CALC-MCNC: 25.2 MG/DL
WBC # BLD AUTO: 5.8 K/UL (ref 4.6–13.2)

## 2023-10-04 PROCEDURE — 80061 LIPID PANEL: CPT

## 2023-10-04 PROCEDURE — 85027 COMPLETE CBC AUTOMATED: CPT

## 2023-10-04 PROCEDURE — 80053 COMPREHEN METABOLIC PANEL: CPT

## 2023-10-04 PROCEDURE — G0103 PSA SCREENING: HCPCS

## 2023-10-04 PROCEDURE — 36415 COLL VENOUS BLD VENIPUNCTURE: CPT

## 2023-10-17 DIAGNOSIS — E78.5 HYPERLIPIDEMIA, UNSPECIFIED: ICD-10-CM

## 2023-10-17 RX ORDER — ATORVASTATIN CALCIUM 40 MG/1
40 TABLET, FILM COATED ORAL
Qty: 90 TABLET | Refills: 3 | Status: SHIPPED | OUTPATIENT
Start: 2023-10-17

## 2023-10-17 NOTE — PROGRESS NOTES
According to patient, his cardiologist suggested boosting lipitor to 40mg. Sent in to pharmacy. Repeat in 3 months.

## 2023-10-20 ENCOUNTER — CLINICAL DOCUMENTATION (OUTPATIENT)
Dept: SPIRITUAL SERVICES | Age: 73
End: 2023-10-20

## 2023-10-20 ENCOUNTER — COMMUNITY OUTREACH (OUTPATIENT)
Dept: SPIRITUAL SERVICES | Age: 73
End: 2023-10-20

## 2023-10-20 NOTE — ACP (ADVANCE CARE PLANNING)
Advance Care Planning   Ambulatory ACP Specialist Patient Outreach    Date:  10/20/2023    ACP Specialist:  HANDY Browne    Outreach call to patient in follow-up to ACP Specialist referral from:Ana Robles MD    [x] PCP  [] Provider   [] Ambulatory Care Management [] Other     For:                  [x] Advance Directive Assistance              [] Complete Portable DNR order              [] Complete POST/POLST/MOST              [] Code Status Discussion             [] Discuss Goals of Care             [] Early ACP Decision-Making              [] Other (Specify)    Date Referral Received:10/03/2023    Next Step:   [x] ACP scheduled conversation  [] Outreach again in one week               [] Email / Mail [address]  [] Email / Mail Advance Directive   [] Closing referral.  Routing closure to referring provider/staff and to ACP Specialist . [] Closure letter mailed to patient with invitation to contact ACP Specialist if / when ready. [] Other (Specify here):         [x] At this time, Healthcare Decision Maker Is:        [] Primary agent named in scanned advance directive. [x] Legal Next of Kin. [] Unable to determine legal decision maker at this time. Outreaches:         [x]  Additional Outreach -  Date:  10/20/2023   (Specify Dates & special circumstances): REMINDER CALL    Outcomes: Placed reminder call to pt today re: ACP visit set for [facility] Oct 23 at 9am. No answer; left VM.     Thank you for this referral.

## 2023-10-23 ENCOUNTER — CLINICAL DOCUMENTATION (OUTPATIENT)
Dept: SPIRITUAL SERVICES | Age: 73
End: 2023-10-23

## 2023-10-23 NOTE — ACP (ADVANCE CARE PLANNING)
Advance Care Planning     Advance Care Planning Clinical Specialist  Conversation Note      Date of ACP Conversation: 10/23/2023    Conversation Conducted with: Patient with Decision Making Capacity    ACP Clinical Specialist: HANDY Webb    Healthcare Decision Maker:     Current Designated Healthcare Decision Maker:     Primary Decision Maker: AMIE [facility] - Spouse - [phone]    Secondary Decision Maker: Sharon Bird - Indra - [phone]    Supplemental (Other) Decision Maker: Saumya Kylah - [phone]    Today we assisted pt with completing his [place] AMD to name HCDM as listed above. Care Preferences    Ventilation: \"If you were in your present state of health and suddenly became very ill and were unable to breathe on your own, what would your preference be about the use of a ventilator (breathing machine) if it were available to you? \"      If the patient would desire the use of ventilator (breathing machine), answer \"yes\". If not, \"no\": yes, discussed with pt when [place] AMD is applicable. \"If your health worsens and it becomes clear that your chance of recovery is unlikely, what would your preference be about the use of a ventilator (breathing machine) if it were available to you? \"     Would the patient desire the use of ventilator (breathing machine)?: No      Resuscitation  \"CPR works best to restart the heart when there is a sudden event, like a heart attack, in someone who is otherwise healthy. Unfortunately, CPR does not typically restart the heart for people who have serious health conditions or who are very sick. \"    \"In the event your heart stopped as a result of an underlying serious health condition, would you want attempts to be made to restart your heart (answer \"yes\" for attempt to resuscitate) or would you prefer a natural death (answer \"no\" for do not attempt to resuscitate)? \"  Code status education provided and pt voiced understanding.  Pt would not want life-sustaining skill demonstration/audio

## 2023-11-16 DIAGNOSIS — Z95.5 HISTORY OF CORONARY ANGIOPLASTY WITH INSERTION OF STENT: ICD-10-CM

## 2023-11-16 DIAGNOSIS — I25.119 CORONARY ARTERY DISEASE INVOLVING NATIVE CORONARY ARTERY OF NATIVE HEART WITH ANGINA PECTORIS (HCC): Primary | ICD-10-CM

## 2023-12-28 ENCOUNTER — HOSPITAL ENCOUNTER (OUTPATIENT)
Facility: HOSPITAL | Age: 73
Discharge: HOME | End: 2023-12-28
Payer: MEDICARE

## 2023-12-28 DIAGNOSIS — E78.5 HYPERLIPIDEMIA, UNSPECIFIED: ICD-10-CM

## 2023-12-28 LAB
ALBUMIN SERPL-MCNC: 3.8 G/DL (ref 3.4–5)
ALBUMIN/GLOB SERPL: 1.2 (ref 0.8–1.7)
ALP SERPL-CCNC: 76 U/L (ref 45–117)
ALT SERPL-CCNC: 13 U/L (ref 16–61)
AST SERPL-CCNC: 23 U/L (ref 10–38)
BILIRUB DIRECT SERPL-MCNC: 0.2 MG/DL (ref 0–0.2)
BILIRUB SERPL-MCNC: 1 MG/DL (ref 0.2–1)
CHOLEST SERPL-MCNC: 124 MG/DL
GLOBULIN SER CALC-MCNC: 3.2 G/DL (ref 2–4)
HDLC SERPL-MCNC: 46 MG/DL (ref 40–60)
HDLC SERPL: 2.7 (ref 0–5)
LDLC SERPL CALC-MCNC: 57.6 MG/DL (ref 0–100)
LIPID PANEL: NORMAL
PROT SERPL-MCNC: 7 G/DL (ref 6.4–8.2)
TRIGL SERPL-MCNC: 102 MG/DL
VLDLC SERPL CALC-MCNC: 20.4 MG/DL

## 2023-12-28 PROCEDURE — 36415 COLL VENOUS BLD VENIPUNCTURE: CPT

## 2023-12-28 PROCEDURE — 80061 LIPID PANEL: CPT

## 2023-12-28 PROCEDURE — 80076 HEPATIC FUNCTION PANEL: CPT

## 2024-03-18 NOTE — PROGRESS NOTES
Chief Complaint   Patient presents with    Hip Pain     C/O left hip pain for about 10-12 days     Rash     Right side of neck, left side of stomach, and mild on right shoulder       \"Have you been to the ER, urgent care clinic since your last visit?  Hospitalized since your last visit?\"    NO    “Have you seen or consulted any other health care providers outside of [facility] since your last visit?”    NO            Click Here for Release of Records Request   The roomer for dr mendoza told  Her covid booster is due. I confirmed.

## 2024-03-20 ENCOUNTER — HOSPITAL ENCOUNTER (OUTPATIENT)
Facility: HOSPITAL | Age: 74
Discharge: HOME | End: 2024-03-23
Payer: MEDICARE

## 2024-03-20 ENCOUNTER — OFFICE VISIT (OUTPATIENT)
Age: 74
End: 2024-03-20
Payer: MEDICARE

## 2024-03-20 VITALS
HEIGHT: 64 IN | BODY MASS INDEX: 26.12 KG/M2 | OXYGEN SATURATION: 98 % | WEIGHT: 153 LBS | DIASTOLIC BLOOD PRESSURE: 80 MMHG | TEMPERATURE: 98 F | HEART RATE: 67 BPM | RESPIRATION RATE: 16 BRPM | SYSTOLIC BLOOD PRESSURE: 122 MMHG

## 2024-03-20 DIAGNOSIS — M25.552 LEFT HIP PAIN: Primary | ICD-10-CM

## 2024-03-20 DIAGNOSIS — M25.552 LEFT HIP PAIN: ICD-10-CM

## 2024-03-20 DIAGNOSIS — R21 RASH: ICD-10-CM

## 2024-03-20 PROCEDURE — 3074F SYST BP LT 130 MM HG: CPT | Performed by: FAMILY MEDICINE

## 2024-03-20 PROCEDURE — 3017F COLORECTAL CA SCREEN DOC REV: CPT | Performed by: FAMILY MEDICINE

## 2024-03-20 PROCEDURE — G8427 DOCREV CUR MEDS BY ELIG CLIN: HCPCS | Performed by: FAMILY MEDICINE

## 2024-03-20 PROCEDURE — 73502 X-RAY EXAM HIP UNI 2-3 VIEWS: CPT

## 2024-03-20 PROCEDURE — G8484 FLU IMMUNIZE NO ADMIN: HCPCS | Performed by: FAMILY MEDICINE

## 2024-03-20 PROCEDURE — 1123F ACP DISCUSS/DSCN MKR DOCD: CPT | Performed by: FAMILY MEDICINE

## 2024-03-20 PROCEDURE — 99213 OFFICE O/P EST LOW 20 MIN: CPT | Performed by: FAMILY MEDICINE

## 2024-03-20 PROCEDURE — 1036F TOBACCO NON-USER: CPT | Performed by: FAMILY MEDICINE

## 2024-03-20 PROCEDURE — 3079F DIAST BP 80-89 MM HG: CPT | Performed by: FAMILY MEDICINE

## 2024-03-20 PROCEDURE — G8417 CALC BMI ABV UP PARAM F/U: HCPCS | Performed by: FAMILY MEDICINE

## 2024-03-20 RX ORDER — TRIAMCINOLONE ACETONIDE 5 MG/G
CREAM TOPICAL
Qty: 15 G | Refills: 0 | Status: SHIPPED | OUTPATIENT
Start: 2024-03-20 | End: 2024-03-27

## 2024-03-20 SDOH — ECONOMIC STABILITY: FOOD INSECURITY: WITHIN THE PAST 12 MONTHS, YOU WORRIED THAT YOUR FOOD WOULD RUN OUT BEFORE YOU GOT MONEY TO BUY MORE.: NEVER TRUE

## 2024-03-20 SDOH — ECONOMIC STABILITY: FOOD INSECURITY: WITHIN THE PAST 12 MONTHS, THE FOOD YOU BOUGHT JUST DIDN'T LAST AND YOU DIDN'T HAVE MONEY TO GET MORE.: NEVER TRUE

## 2024-03-20 SDOH — ECONOMIC STABILITY: INCOME INSECURITY: HOW HARD IS IT FOR YOU TO PAY FOR THE VERY BASICS LIKE FOOD, HOUSING, MEDICAL CARE, AND HEATING?: NOT VERY HARD

## 2024-03-20 SDOH — ECONOMIC STABILITY: HOUSING INSECURITY
IN THE LAST 12 MONTHS, WAS THERE A TIME WHEN YOU DID NOT HAVE A STEADY PLACE TO SLEEP OR SLEPT IN A SHELTER (INCLUDING NOW)?: NO

## 2024-03-20 ASSESSMENT — PATIENT HEALTH QUESTIONNAIRE - PHQ9
SUM OF ALL RESPONSES TO PHQ9 QUESTIONS 1 & 2: 0
SUM OF ALL RESPONSES TO PHQ QUESTIONS 1-9: 0
1. LITTLE INTEREST OR PLEASURE IN DOING THINGS: NOT AT ALL
SUM OF ALL RESPONSES TO PHQ QUESTIONS 1-9: 0
SUM OF ALL RESPONSES TO PHQ QUESTIONS 1-9: 0
2. FEELING DOWN, DEPRESSED OR HOPELESS: NOT AT ALL
SUM OF ALL RESPONSES TO PHQ QUESTIONS 1-9: 0

## 2024-03-20 NOTE — PROGRESS NOTES
SUBJECTIVE  Chief Complaint   Patient presents with    Hip Pain     C/O left hip pain for about 10-12 days     Rash     Right side of neck, left side of stomach, and mild on right shoulder      Patient presents for evaluation of 10-12 days of hip pain.    Injury / mechanism: no injuries  Location: groin  Radiation: denies   Quality: sore, intermittent depending on position   Relieving factors: topical, Advil  Aggravating factors: sitting with leg extended at knee while driving  History of similar: denies   No weakness. They deny any numbness or tingling extending down the leg.       Has noticed two rashes around the same time that developed on his right neck and left hip crease.  It is getting better with topical OTC HC crm.    OBJECTIVE    Blood pressure 122/80, pulse 67, temperature 98 °F (36.7 °C), temperature source Temporal, resp. rate 16, height 1.626 m (5' 4.02\"), weight 69.4 kg (153 lb), SpO2 98 %.  General:  alert, cooperative, well appearing, in no apparent distress.  Left Hip:  Inspection of the hip demonstrates there is no obvious deformity or effusion.  The bilateral hip is stable without evidence of dislocation.  There is normal flexion, extension, internal and external rotation of the hip bilaterally.  There is no tenderness to palpation along the greater trochanteric bursa.  There is no point tenderness to palpation over the ASIS.   The piriformis is no tender.     Skin:  No overlying warmth or redness.  There is a oval shaped rash that has central clearing and is hyperpigmented at the margins.  It is flat.  Neurological:  There is no obvious focal sensory or motor deficits.    Psych: normal affect.  Mood good.  Oriented x 3.  Judgement and insight intact.     ASSESSMENT / PLAN   Diagnosis Orders   1. Left hip pain  XR HIP LEFT (2-3 VIEWS)      2. Rash          Left hip pain - patient mainly wants reassurance to get back into exercise.  I cannot reproduce his pain so I think getting back to exercise

## 2024-09-30 ENCOUNTER — OFFICE VISIT (OUTPATIENT)
Age: 74
End: 2024-09-30
Payer: MEDICARE

## 2024-09-30 VITALS
SYSTOLIC BLOOD PRESSURE: 150 MMHG | WEIGHT: 152 LBS | RESPIRATION RATE: 16 BRPM | BODY MASS INDEX: 25.95 KG/M2 | DIASTOLIC BLOOD PRESSURE: 88 MMHG | TEMPERATURE: 98.2 F | HEART RATE: 60 BPM | HEIGHT: 64 IN | OXYGEN SATURATION: 98 %

## 2024-09-30 DIAGNOSIS — J45.20 MILD INTERMITTENT REACTIVE AIRWAY DISEASE WITHOUT COMPLICATION: ICD-10-CM

## 2024-09-30 DIAGNOSIS — R06.09 DYSPNEA ON EXERTION: Primary | ICD-10-CM

## 2024-09-30 PROCEDURE — 3017F COLORECTAL CA SCREEN DOC REV: CPT | Performed by: INTERNAL MEDICINE

## 2024-09-30 PROCEDURE — G8428 CUR MEDS NOT DOCUMENT: HCPCS | Performed by: INTERNAL MEDICINE

## 2024-09-30 PROCEDURE — G8417 CALC BMI ABV UP PARAM F/U: HCPCS | Performed by: INTERNAL MEDICINE

## 2024-09-30 PROCEDURE — 99214 OFFICE O/P EST MOD 30 MIN: CPT | Performed by: INTERNAL MEDICINE

## 2024-09-30 PROCEDURE — 3077F SYST BP >= 140 MM HG: CPT | Performed by: INTERNAL MEDICINE

## 2024-09-30 PROCEDURE — 1036F TOBACCO NON-USER: CPT | Performed by: INTERNAL MEDICINE

## 2024-09-30 PROCEDURE — 3079F DIAST BP 80-89 MM HG: CPT | Performed by: INTERNAL MEDICINE

## 2024-09-30 PROCEDURE — 1123F ACP DISCUSS/DSCN MKR DOCD: CPT | Performed by: INTERNAL MEDICINE

## 2024-09-30 RX ORDER — ALBUTEROL SULFATE 90 UG/1
1-2 INHALANT RESPIRATORY (INHALATION) EVERY 4 HOURS PRN
Qty: 1 EACH | Refills: 5 | Status: SHIPPED | OUTPATIENT
Start: 2024-09-30

## 2024-09-30 NOTE — PROGRESS NOTES
[address] [facility]  [address]  [place]  [address]  [phone]    [facility]  Pulmonary, Critical Care, and Sleep Medicine    Pulmonary Office F/U  Name: Gwyn Marino 73 y.o. male  MRN: [id number]  : 1950  Service Date: 24  Chief Complaint:   Chief Complaint   Patient presents with    Shortness of Breath     Follow up from 2022; last seen by Dr. DIANDRA Marino    Other     Abnormal PFT    ILD    Results     HRCT chest 2022, Nuclear stress test 2023         History of Present Illness:  Gwyn Marino is a 73 y.o. male, who presents to Pulmonary clinic for followup of SOB  Former DIANDRA Marino pt, last seen on 10/24/22.  Pt reports overall he is doing well.  Pt reports that now he tends to get easily fatigued.  He used to bicycle, used to do a mile in 6 min mile, but now takes about 8 min mile.  He rpeotss that he has gone a few weeks without bicylcing  Reports he uses incentive spirometer a few times per day  Reports very few triggers to dyspnea --- reports /chemicals, no issues with pollen  No nocturnal awakenings  No rescue inhaler ---> pt reports his current one is   No exacerbations in the interval      Past Medical History:   Diagnosis Date    Coronary atherosclerosis of native coronary artery     No angina, H/O stent     COVID-19     H/O colonoscopy at 56yo    patient report - unable to get records    H/O colonoscopy with polypectomy 2020    Dr. Hernadez; polyps x 3 (tubular adenoma x 2 and benign mucosal tag); diverticulosis in the sigmoid colon; repeat colonoscopy due in 3 years    H/O colonoscopy with polypectomy 2017    polyp - follow-up 3 years    HTN (hypertension)     Other and unspecified hyperlipidemia     On Lipitor    Postsurgical percutaneous transluminal coronary angioplasty status      Past Surgical History:   Procedure Laterality Date    CATARACT EXTRACTION, BILATERAL Bilateral     CORONARY ANGIOPLASTY WITH STENT

## 2024-09-30 NOTE — PROGRESS NOTES
Gwyn Marino presents today for   Chief Complaint   Patient presents with    Shortness of Breath     Follow up from 6/7/2022; last seen by Dr. DIANDRA Marino    Other     Abnormal PFT    ILD    Results     HRCT chest 6/14/2022, Nuclear stress test 11/29/2023       Is someone accompanying this pt? No    Is the patient using any DME equipment during OV? No    -DME Company n/A    Depression Screening:        3/20/2024     3:21 PM   PHQ-9 Questionaire   Little interest or pleasure in doing things 0   Feeling down, depressed, or hopeless 0   PHQ-9 Total Score 0       Learning Needs Questionnaire:     Who is the primary learner? Patient    What is the preferred language for health care of the primary learner? ENGLISH    How does the primary learner prefer to learn new concepts? DEMONSTRATION    Answered By Patient    Relationship to Learner SELF          Fall Risk:         3/20/2024     3:21 PM 10/3/2023     8:14 AM 10/3/2023     8:09 AM 3/6/2023     1:57 PM   Fall Risk   Do you feel unsteady or are you worried about falling?  no no no yes   2 or more falls in past year? no no no no   Fall with injury in past year? no no no no        Abuse Screening:         3/20/2024     3:00 PM 10/3/2023     8:00 AM 3/6/2023     1:00 PM   AMB Abuse Screening   Do you ever feel afraid of your partner? N N N   Are you in a relationship with someone who physically or mentally threatens you? N N N   Is it safe for you to go home? Y Y Y         Coordination of Care:    1. Have you been to the ER, urgent care clinic since your last visit? Hospitalized since your last visit? No    2. Have you seen or consulted any other health care providers outside of the [facility] since your last visit? Include any pap smears or colon screening. Yes. Dr. Franck Thompson, GI    Medication list has been update per patient.

## 2024-10-03 ENCOUNTER — TELEPHONE (OUTPATIENT)
Facility: CLINIC | Age: 74
End: 2024-10-03

## 2024-10-03 DIAGNOSIS — Z12.5 ENCOUNTER FOR SCREENING FOR MALIGNANT NEOPLASM OF PROSTATE: ICD-10-CM

## 2024-10-03 DIAGNOSIS — I10 ESSENTIAL (PRIMARY) HYPERTENSION: Primary | ICD-10-CM

## 2024-10-03 DIAGNOSIS — E78.5 HYPERLIPIDEMIA, UNSPECIFIED HYPERLIPIDEMIA TYPE: ICD-10-CM

## 2024-10-04 NOTE — PROGRESS NOTES
Chief Complaint   Patient presents with    Medicare AWV      \"Have you been to the ER, urgent care clinic since your last visit?  Hospitalized since your last visit?\"    NO    “Have you seen or consulted any other health care providers outside our system since your last visit?”    NO

## 2024-10-04 NOTE — PATIENT INSTRUCTIONS
[employer], [facility] disclaims any warranty or liability for your use of this information.       Patient Education        Well Visit, Over 65: Care Instructions  Well visits can help you stay healthy. Your doctor has checked your overall health and may have suggested ways to take good care of yourself. Your doctor also may have recommended tests. You can help prevent illness with healthy eating, good sleep, vaccinations, regular exercise, and other steps.    Get the tests that you and your doctor decide on. Depending on your age and risks, examples might include hearing tests as well as screening for colon, breast, and lung cancer. Screening helps find diseases before any symptoms appear.   Eat healthy foods. Choose fruits, vegetables, whole grains, lean protein, and low-fat dairy foods. Limit saturated fat, and reduce salt.     Limit alcohol. Men should have no more than 2 drinks a day. Women should have no more than 1. For some people, no alcohol is the best choice.   Exercise. It can help prevent falls. Get at least 30 minutes of exercise on most days of the week. Walking, yoga, and betsy chi can be good choices.     Reach and stay at your healthy weight. This will lower your risk for many health problems.   Take care of your mental health. Try to stay connected with friends, family, and community, and find ways to manage stress.     If you're feeling depressed or hopeless, talk to someone. A counselor can help. If you don't have a counselor, talk to your doctor.   Talk to your doctor if you think you may have a problem with alcohol or drug use. This includes prescription medicines and illegal drugs.     Avoid tobacco and nicotine: Don't smoke, vape, or chew. If you need help quitting, talk to your doctor.   Practice safer sex. Getting tested, using condoms or dental dams, and limiting sex partners can help prevent STIs.     Make an advance directive. This is a legal way to tell your family and doctor what you

## 2024-10-04 NOTE — TELEPHONE ENCOUNTER
Left message via voicemail advising Mr. Marino fasting labs ordered for him to complete prior to his 10/08/2024 appointment.

## 2024-10-05 ENCOUNTER — HOSPITAL ENCOUNTER (OUTPATIENT)
Facility: HOSPITAL | Age: 74
Discharge: HOME | End: 2024-10-08
Payer: MEDICARE

## 2024-10-05 DIAGNOSIS — Z12.5 ENCOUNTER FOR SCREENING FOR MALIGNANT NEOPLASM OF PROSTATE: ICD-10-CM

## 2024-10-05 DIAGNOSIS — E78.5 HYPERLIPIDEMIA, UNSPECIFIED HYPERLIPIDEMIA TYPE: ICD-10-CM

## 2024-10-05 DIAGNOSIS — I10 ESSENTIAL (PRIMARY) HYPERTENSION: ICD-10-CM

## 2024-10-05 LAB
ALBUMIN SERPL-MCNC: 3.6 G/DL (ref 3.4–5)
ALBUMIN/GLOB SERPL: 1 (ref 0.8–1.7)
ALP SERPL-CCNC: 85 U/L (ref 45–117)
ALT SERPL-CCNC: 11 U/L (ref 16–61)
ANION GAP SERPL CALC-SCNC: 5 MMOL/L (ref 3–18)
AST SERPL-CCNC: 21 U/L (ref 10–38)
BASOPHILS # BLD: 0.1 K/UL (ref 0–0.1)
BASOPHILS NFR BLD: 1 % (ref 0–2)
BILIRUB SERPL-MCNC: 0.7 MG/DL (ref 0.2–1)
BUN SERPL-MCNC: 16 MG/DL (ref 7–18)
BUN/CREAT SERPL: 13 (ref 12–20)
CALCIUM SERPL-MCNC: 8.7 MG/DL (ref 8.5–10.1)
CHLORIDE SERPL-SCNC: 108 MMOL/L (ref 100–111)
CHOLEST SERPL-MCNC: 109 MG/DL
CO2 SERPL-SCNC: 26 MMOL/L (ref 21–32)
CREAT SERPL-MCNC: 1.2 MG/DL (ref 0.6–1.3)
DIFFERENTIAL METHOD BLD: NORMAL
EOSINOPHIL # BLD: 0.2 K/UL (ref 0–0.4)
EOSINOPHIL NFR BLD: 3 % (ref 0–5)
ERYTHROCYTE [DISTWIDTH] IN BLOOD BY AUTOMATED COUNT: 13.2 % (ref 11.6–14.5)
GLOBULIN SER CALC-MCNC: 3.5 G/DL (ref 2–4)
GLUCOSE SERPL-MCNC: 80 MG/DL (ref 74–99)
HCT VFR BLD AUTO: 41.7 % (ref 36–48)
HDLC SERPL-MCNC: 42 MG/DL (ref 40–60)
HDLC SERPL: 2.6 (ref 0–5)
HGB BLD-MCNC: 13.6 G/DL (ref 13–16)
IMM GRANULOCYTES # BLD AUTO: 0 K/UL (ref 0–0.04)
IMM GRANULOCYTES NFR BLD AUTO: 0 % (ref 0–0.5)
LDLC SERPL CALC-MCNC: 50.4 MG/DL (ref 0–100)
LIPID PANEL: NORMAL
LYMPHOCYTES # BLD: 1.8 K/UL (ref 0.9–3.6)
LYMPHOCYTES NFR BLD: 25 % (ref 21–52)
MCH RBC QN AUTO: 27.9 PG (ref 24–34)
MCHC RBC AUTO-ENTMCNC: 32.6 G/DL (ref 31–37)
MCV RBC AUTO: 85.6 FL (ref 78–100)
MONOCYTES # BLD: 0.7 K/UL (ref 0.05–1.2)
MONOCYTES NFR BLD: 10 % (ref 3–10)
NEUTS SEG # BLD: 4.6 K/UL (ref 1.8–8)
NEUTS SEG NFR BLD: 62 % (ref 40–73)
NRBC # BLD: 0 K/UL (ref 0–0.01)
NRBC BLD-RTO: 0 PER 100 WBC
PLATELET # BLD AUTO: 218 K/UL (ref 135–420)
PMV BLD AUTO: 11.2 FL (ref 9.2–11.8)
POTASSIUM SERPL-SCNC: 4.8 MMOL/L (ref 3.5–5.5)
PROT SERPL-MCNC: 7.1 G/DL (ref 6.4–8.2)
PSA SERPL-MCNC: 2.5 NG/ML (ref 0–4)
RBC # BLD AUTO: 4.87 M/UL (ref 4.35–5.65)
SODIUM SERPL-SCNC: 139 MMOL/L (ref 136–145)
TRIGL SERPL-MCNC: 83 MG/DL
VLDLC SERPL CALC-MCNC: 16.6 MG/DL
WBC # BLD AUTO: 7.5 K/UL (ref 4.6–13.2)

## 2024-10-05 PROCEDURE — 85025 COMPLETE CBC W/AUTO DIFF WBC: CPT

## 2024-10-05 PROCEDURE — 80061 LIPID PANEL: CPT

## 2024-10-05 PROCEDURE — G0103 PSA SCREENING: HCPCS

## 2024-10-05 PROCEDURE — 80053 COMPREHEN METABOLIC PANEL: CPT

## 2024-10-05 PROCEDURE — 36415 COLL VENOUS BLD VENIPUNCTURE: CPT

## 2024-10-08 ENCOUNTER — OFFICE VISIT (OUTPATIENT)
Facility: CLINIC | Age: 74
End: 2024-10-08
Payer: MEDICARE

## 2024-10-08 ENCOUNTER — OFFICE VISIT (OUTPATIENT)
Facility: CLINIC | Age: 74
End: 2024-10-08

## 2024-10-08 VITALS
RESPIRATION RATE: 16 BRPM | WEIGHT: 152 LBS | DIASTOLIC BLOOD PRESSURE: 70 MMHG | BODY MASS INDEX: 25.95 KG/M2 | TEMPERATURE: 98.7 F | HEART RATE: 68 BPM | OXYGEN SATURATION: 99 % | SYSTOLIC BLOOD PRESSURE: 120 MMHG | HEIGHT: 64 IN

## 2024-10-08 VITALS
RESPIRATION RATE: 16 BRPM | DIASTOLIC BLOOD PRESSURE: 70 MMHG | HEART RATE: 68 BPM | TEMPERATURE: 98.7 F | OXYGEN SATURATION: 99 % | BODY MASS INDEX: 25.95 KG/M2 | HEIGHT: 64 IN | SYSTOLIC BLOOD PRESSURE: 120 MMHG | WEIGHT: 152 LBS

## 2024-10-08 DIAGNOSIS — I25.83 CORONARY ARTERY DISEASE DUE TO LIPID RICH PLAQUE: ICD-10-CM

## 2024-10-08 DIAGNOSIS — E78.5 HYPERLIPIDEMIA, UNSPECIFIED HYPERLIPIDEMIA TYPE: ICD-10-CM

## 2024-10-08 DIAGNOSIS — Z71.89 ACP (ADVANCE CARE PLANNING): ICD-10-CM

## 2024-10-08 DIAGNOSIS — I25.10 CORONARY ARTERY DISEASE DUE TO LIPID RICH PLAQUE: ICD-10-CM

## 2024-10-08 DIAGNOSIS — I10 ESSENTIAL (PRIMARY) HYPERTENSION: Primary | ICD-10-CM

## 2024-10-08 DIAGNOSIS — Z23 NEED FOR IMMUNIZATION AGAINST INFLUENZA: ICD-10-CM

## 2024-10-08 DIAGNOSIS — Z00.00 MEDICARE ANNUAL WELLNESS VISIT, SUBSEQUENT: Primary | ICD-10-CM

## 2024-10-08 PROBLEM — R06.02 SOB (SHORTNESS OF BREATH) ON EXERTION: Status: RESOLVED | Noted: 2022-06-07 | Resolved: 2024-10-08

## 2024-10-08 PROCEDURE — G0008 ADMIN INFLUENZA VIRUS VAC: HCPCS | Performed by: FAMILY MEDICINE

## 2024-10-08 PROCEDURE — 3017F COLORECTAL CA SCREEN DOC REV: CPT | Performed by: FAMILY MEDICINE

## 2024-10-08 PROCEDURE — G8417 CALC BMI ABV UP PARAM F/U: HCPCS | Performed by: FAMILY MEDICINE

## 2024-10-08 PROCEDURE — 3078F DIAST BP <80 MM HG: CPT | Performed by: FAMILY MEDICINE

## 2024-10-08 PROCEDURE — G8427 DOCREV CUR MEDS BY ELIG CLIN: HCPCS | Performed by: FAMILY MEDICINE

## 2024-10-08 PROCEDURE — 1036F TOBACCO NON-USER: CPT | Performed by: FAMILY MEDICINE

## 2024-10-08 PROCEDURE — 3074F SYST BP LT 130 MM HG: CPT | Performed by: FAMILY MEDICINE

## 2024-10-08 PROCEDURE — G8482 FLU IMMUNIZE ORDER/ADMIN: HCPCS | Performed by: FAMILY MEDICINE

## 2024-10-08 PROCEDURE — 99214 OFFICE O/P EST MOD 30 MIN: CPT | Performed by: FAMILY MEDICINE

## 2024-10-08 PROCEDURE — 1123F ACP DISCUSS/DSCN MKR DOCD: CPT | Performed by: FAMILY MEDICINE

## 2024-10-08 PROCEDURE — 90653 IIV ADJUVANT VACCINE IM: CPT | Performed by: FAMILY MEDICINE

## 2024-10-08 ASSESSMENT — LIFESTYLE VARIABLES
HOW OFTEN DO YOU HAVE A DRINK CONTAINING ALCOHOL: NEVER
HOW MANY STANDARD DRINKS CONTAINING ALCOHOL DO YOU HAVE ON A TYPICAL DAY: PATIENT DOES NOT DRINK

## 2024-10-08 ASSESSMENT — PATIENT HEALTH QUESTIONNAIRE - PHQ9
SUM OF ALL RESPONSES TO PHQ QUESTIONS 1-9: 0
SUM OF ALL RESPONSES TO PHQ9 QUESTIONS 1 & 2: 0
SUM OF ALL RESPONSES TO PHQ QUESTIONS 1-9: 0
SUM OF ALL RESPONSES TO PHQ QUESTIONS 1-9: 0
1. LITTLE INTEREST OR PLEASURE IN DOING THINGS: NOT AT ALL
SUM OF ALL RESPONSES TO PHQ QUESTIONS 1-9: 0
2. FEELING DOWN, DEPRESSED OR HOPELESS: NOT AT ALL

## 2024-10-08 NOTE — PATIENT INSTRUCTIONS

## 2024-10-08 NOTE — PROGRESS NOTES
Chief Complaint   Patient presents with    Results     Review of results     Hypertension    Coronary Artery Disease    Cholesterol Problem      \"Have you been to the ER, urgent care clinic since your last visit?  Hospitalized since your last visit?\"    NO    “Have you seen or consulted any other health care providers outside our system since your last visit?”    NO    Physician order obtained. Patient completed adult immunization consent form.  Allergies, contraindications and recommendations reviewed with patient. Seasonal influenza vaccine administered IM right deltoid.  Patient tolerated well.  Patient remained in office for 15 minutes after injection and no adverse reactions were noted.     Lot # [id number]  Exp Date: 05/02/2025  NDC # 84270-587-95       
Alternatives have been explained and offered.  All questions answered.  The patient is to call if condition worsens or fails to improve.     Return in about 6 months (around 4/8/2025) for routine care.

## 2024-10-08 NOTE — ACP (ADVANCE CARE PLANNING)
Advance Care Planning    General Advance Care Planning (ACP) Conversation      Date of Conversation: 10/8/2024  Conducted with: Patient with Decision Making Capacity    Healthcare Decision Maker:     Primary Decision Maker: Ana Marino - Spouse - [phone]    Secondary Decision Maker: Brian Marino - Child - [phone]    Supplemental (Other) Decision Maker: Jeffery Marino - Child - [phone]      Content/Action Overview:   Has ACP documents/care preferences - referred to ACP Clinical Specialist and has completed forms  Reviewed DNR/DNI and patient elects Full Code (Attempt Resuscitation)  Topics discussed: treatment goals, benefit/burden of treatment options, artificial nutrition, ventilation preferences, hospitalization preferences, resuscitation preferences, end of life care preferences (vegetative state/imminent death), and hospice care.  Does not want intervention if terminal or unaware of surroundings.    His son is a physician but not local, currently in [place] soon to move to [place].  Reviewed ACP.  He is advised we will review this annually.       Length of Voluntary ACP Conversation in minutes:  16 minutes    Mitchel Robles MD

## 2024-10-08 NOTE — PROGRESS NOTES
Medicare Annual Wellness Visit    Gwyn Marino is here for Medicare AWV    Assessment & Plan   Medicare annual wellness visit, subsequent  ACP (advance care planning)  -     [place] Advanced Care Planning (16-30 minutes) [95586]  Recommendations for Preventive Services Due: see orders and patient instructions/AVS.  Recommended screening schedule for the next 5-10 years is provided to the patient in written form: see Patient Instructions/AVS.     AWV annually.     Subjective     Patient's complete Health Risk Assessment and screening values have been reviewed and are found in Flowsheets. The following problems were reviewed today and where indicated follow up appointments were made and/or referrals ordered.    No Positive Risk Factors identified today.                                  Objective   Vitals:    10/08/24 0748   BP: 120/70   Site: Left Upper Arm   Position: Sitting   Cuff Size: Large Adult   Pulse: 68   Resp: 16   Temp: 98.7 °F (37.1 °C)   TempSrc: Tympanic   SpO2: 99%   Weight: 68.9 kg (152 lb)   Height: 1.626 m (5' 4\")      Body mass index is 26.09 kg/m².                 No Known Allergies  Prior to Visit Medications    Medication Sig Taking? Authorizing Provider   atorvastatin (LIPITOR) 40 MG tablet Take 1 tablet by mouth nightly Yes Mitchel Robles MD   metoprolol succinate (TOPROL XL) 100 MG extended release tablet Take 1 tablet by mouth daily Yes Clarissa Marino MD   aspirin 81 MG EC tablet Take 1 tablet by mouth daily Yes Provider, MD Zo   albuterol sulfate HFA (PROVENTIL HFA) 108 (90 Base) MCG/ACT inhaler Inhale 1-2 puffs into the lungs every 4 hours as needed for Wheezing or Shortness of Breath Can substitute any formulary approved albuterol formulation (Proventil, Proair, Ventolin, etc)  Gwyn Marino MD       [facility] (Including outside providers/suppliers regularly involved in providing care):   Patient Care Team:  Mitchel Robles MD as PCP - General  Mitchel Robles MD as PCP - Empaneled

## 2024-11-18 DIAGNOSIS — E78.5 HYPERLIPIDEMIA, UNSPECIFIED: ICD-10-CM

## 2024-11-18 DIAGNOSIS — I10 ESSENTIAL (PRIMARY) HYPERTENSION: ICD-10-CM

## 2024-11-18 NOTE — TELEPHONE ENCOUNTER
This patient contacted office for the following prescriptions to be filled:    Medication requested : atorvastatin (LIPITOR) 40 MG tablet QTY 90 Refill 1     metoprolol succinate (TOPROL XL) 100 MG extended release tablet QTY 90 Refill 1  PCP: Margaret  Pharmacy or Print: [facility]   Mail order or Local pharmacy [address] [place] [address]      Scheduled appointment if not seen by current providers in office: LOV 10/8/2024 ALICE 4/18/2025

## 2024-11-19 RX ORDER — ATORVASTATIN CALCIUM 40 MG/1
40 TABLET, FILM COATED ORAL
Qty: 90 TABLET | Refills: 0 | OUTPATIENT
Start: 2024-11-19

## 2024-11-19 RX ORDER — ATORVASTATIN CALCIUM 40 MG/1
40 TABLET, FILM COATED ORAL
Qty: 90 TABLET | Refills: 1 | Status: SHIPPED | OUTPATIENT
Start: 2024-11-19

## 2024-11-19 RX ORDER — METOPROLOL SUCCINATE 100 MG/1
100 TABLET, EXTENDED RELEASE ORAL DAILY
Qty: 90 TABLET | Refills: 1 | Status: SHIPPED | OUTPATIENT
Start: 2024-11-19

## 2024-11-21 ENCOUNTER — OFFICE VISIT (OUTPATIENT)
Age: 74
End: 2024-11-21

## 2024-11-21 VITALS
DIASTOLIC BLOOD PRESSURE: 81 MMHG | SYSTOLIC BLOOD PRESSURE: 139 MMHG | OXYGEN SATURATION: 99 % | HEIGHT: 60 IN | BODY MASS INDEX: 29.64 KG/M2 | WEIGHT: 151 LBS | HEART RATE: 55 BPM

## 2024-11-21 DIAGNOSIS — E78.2 MIXED HYPERLIPIDEMIA: ICD-10-CM

## 2024-11-21 DIAGNOSIS — I10 ESSENTIAL HYPERTENSION: ICD-10-CM

## 2024-11-21 DIAGNOSIS — I25.10 ATHEROSCLEROSIS OF NATIVE CORONARY ARTERY OF NATIVE HEART WITHOUT ANGINA PECTORIS: Primary | ICD-10-CM

## 2024-11-21 DIAGNOSIS — Z95.5 HISTORY OF CORONARY ARTERY STENT PLACEMENT: ICD-10-CM

## 2024-11-21 ASSESSMENT — ENCOUNTER SYMPTOMS
GASTROINTESTINAL NEGATIVE: 1
RESPIRATORY NEGATIVE: 1
EYES NEGATIVE: 1

## 2024-11-21 NOTE — PROGRESS NOTES
Gwyn Marino is a 73 y.o. year old male.    Follow-up of CAD, old LAD stent, hypertension, hyperlipidemia, SVT (during stress test in 2021)      11/2021  Patient seen today for follow-up.  He comes in with complaint of increased shortness of breath that is going on for about 1 to 2 months which is progressively getting worse.  Denies any chest pain.  He feels fatigue and tired.  11/21/2024 No significant chest pain, shortness of breath, edema, dizziness, palpitations or loss of consciousness.  BP at home 139/82 1 week ago and in PCPs office was excellent 120/70 a month ago                Review of Systems   Constitutional: Negative.    HENT: Negative.     Eyes: Negative.    Respiratory: Negative.     Cardiovascular: Negative.    Gastrointestinal: Negative.    Endocrine: Negative.    Genitourinary: Negative.    Musculoskeletal: Negative.    Neurological: Negative.    Psychiatric/Behavioral: Negative.     All other systems reviewed and are negative.        Physical Exam  Vitals and nursing note reviewed.   Constitutional:       Appearance: Normal appearance.   HENT:      Head: Normocephalic and atraumatic.      Nose: Nose normal.   Eyes:      Conjunctiva/sclera: Conjunctivae normal.   Cardiovascular:      Rate and Rhythm: Normal rate and regular rhythm.      Pulses: Normal pulses.      Heart sounds: Normal heart sounds.   Pulmonary:      Effort: Pulmonary effort is normal.      Breath sounds: Normal breath sounds.   Abdominal:      General: Bowel sounds are normal.      Palpations: Abdomen is soft.   Musculoskeletal:         General: Normal range of motion.      Right lower leg: No edema.      Left lower leg: No edema.   Skin:     General: Skin is warm and dry.   Neurological:      General: No focal deficit present.      Mental Status: He is alert and oriented to person, place, and time.   Psychiatric:         Mood and Affect: Mood normal.         Behavior: Behavior normal.        No Known Allergies    Past Medical

## 2025-04-05 SDOH — ECONOMIC STABILITY: TRANSPORTATION INSECURITY
IN THE PAST 12 MONTHS, HAS THE LACK OF TRANSPORTATION KEPT YOU FROM MEDICAL APPOINTMENTS OR FROM GETTING MEDICATIONS?: NO

## 2025-04-05 SDOH — ECONOMIC STABILITY: FOOD INSECURITY: WITHIN THE PAST 12 MONTHS, THE FOOD YOU BOUGHT JUST DIDN'T LAST AND YOU DIDN'T HAVE MONEY TO GET MORE.: SOMETIMES TRUE

## 2025-04-05 SDOH — ECONOMIC STABILITY: TRANSPORTATION INSECURITY
IN THE PAST 12 MONTHS, HAS LACK OF TRANSPORTATION KEPT YOU FROM MEETINGS, WORK, OR FROM GETTING THINGS NEEDED FOR DAILY LIVING?: NO

## 2025-04-05 SDOH — ECONOMIC STABILITY: INCOME INSECURITY: IN THE LAST 12 MONTHS, WAS THERE A TIME WHEN YOU WERE NOT ABLE TO PAY THE MORTGAGE OR RENT ON TIME?: YES

## 2025-04-05 SDOH — ECONOMIC STABILITY: FOOD INSECURITY: WITHIN THE PAST 12 MONTHS, YOU WORRIED THAT YOUR FOOD WOULD RUN OUT BEFORE YOU GOT MONEY TO BUY MORE.: SOMETIMES TRUE

## 2025-04-07 NOTE — PATIENT INSTRUCTIONS
class, or join a sports league.     Eat a variety of healthy foods. These include fruits, vegetables, whole grains, low-fat dairy, and lean protein.   Choose healthy portions of food. You can use the Nutrition Facts label on food packages as a guide.     Eat more fruits and vegetables. You could add vegetables to sandwiches or add fruit to cereal.   Drink water when you are thirsty. Limit soda, juice, and sports drinks.     Try to exercise most days. Aim for at least 2½ hours of exercise each week.   Keep moving. Work in the garden or take your dog on a walk. Use the stairs instead of the elevator.     If you use tobacco or nicotine, try to quit. Ask your doctor about programs and medicines to help you quit.   Limit alcohol. Men should have no more than 2 drinks a day. Women should have no more than 1. For some people, no alcohol is the best choice.   Follow-up care is a key part of your treatment and safety. Be sure to make and go to all appointments, and call your doctor if you are having problems. It's also a good idea to know your test results and keep a list of the medicines you take.  Where can you learn more?  Go to https://www.[employer].net/patientEd and enter U807 to learn more about \"A Healthy Lifestyle: Care Instructions.\"  Current as of: April 30, 2024  Content Version: 14.4  © 0481-9096 [employer].   Care instructions adapted under license by [facility] [place] [facility]. If you have questions about a medical condition or this instruction, always ask your healthcare professional. [employer], [employer], disclaims any warranty or liability for your use of this information.

## 2025-04-07 NOTE — PROGRESS NOTES
Chief Complaint   Patient presents with    Coronary Artery Disease    Hyperlipidemia    Hypertension      \"Have you been to the ER, urgent care clinic since your last visit?  Hospitalized since your last visit?\"    NO    “Have you seen or consulted any other health care providers outside our system since your last visit?”    NO    No updated immunization record noted on [place] Immunization Registry as of 04/07/2025

## 2025-04-08 ENCOUNTER — OFFICE VISIT (OUTPATIENT)
Facility: CLINIC | Age: 75
End: 2025-04-08
Payer: MEDICARE

## 2025-04-08 VITALS
WEIGHT: 154 LBS | SYSTOLIC BLOOD PRESSURE: 120 MMHG | TEMPERATURE: 98.6 F | HEIGHT: 60 IN | RESPIRATION RATE: 18 BRPM | OXYGEN SATURATION: 100 % | DIASTOLIC BLOOD PRESSURE: 82 MMHG | HEART RATE: 78 BPM | BODY MASS INDEX: 30.23 KG/M2

## 2025-04-08 DIAGNOSIS — E78.2 MIXED HYPERLIPIDEMIA: ICD-10-CM

## 2025-04-08 DIAGNOSIS — I25.83 CORONARY ARTERY DISEASE DUE TO LIPID RICH PLAQUE: ICD-10-CM

## 2025-04-08 DIAGNOSIS — I10 ESSENTIAL (PRIMARY) HYPERTENSION: Primary | ICD-10-CM

## 2025-04-08 DIAGNOSIS — R73.01 IFG (IMPAIRED FASTING GLUCOSE): ICD-10-CM

## 2025-04-08 DIAGNOSIS — Z12.5 ENCOUNTER FOR SCREENING FOR MALIGNANT NEOPLASM OF PROSTATE: ICD-10-CM

## 2025-04-08 DIAGNOSIS — R73.03 PREDIABETES: ICD-10-CM

## 2025-04-08 DIAGNOSIS — I25.10 CORONARY ARTERY DISEASE DUE TO LIPID RICH PLAQUE: ICD-10-CM

## 2025-04-08 DIAGNOSIS — M72.2 PLANTAR FASCIITIS, RIGHT: ICD-10-CM

## 2025-04-08 PROCEDURE — 3017F COLORECTAL CA SCREEN DOC REV: CPT | Performed by: FAMILY MEDICINE

## 2025-04-08 PROCEDURE — 1159F MED LIST DOCD IN RCRD: CPT | Performed by: FAMILY MEDICINE

## 2025-04-08 PROCEDURE — 1160F RVW MEDS BY RX/DR IN RCRD: CPT | Performed by: FAMILY MEDICINE

## 2025-04-08 PROCEDURE — G8427 DOCREV CUR MEDS BY ELIG CLIN: HCPCS | Performed by: FAMILY MEDICINE

## 2025-04-08 PROCEDURE — 99214 OFFICE O/P EST MOD 30 MIN: CPT | Performed by: FAMILY MEDICINE

## 2025-04-08 PROCEDURE — 1123F ACP DISCUSS/DSCN MKR DOCD: CPT | Performed by: FAMILY MEDICINE

## 2025-04-08 PROCEDURE — 1126F AMNT PAIN NOTED NONE PRSNT: CPT | Performed by: FAMILY MEDICINE

## 2025-04-08 PROCEDURE — G8417 CALC BMI ABV UP PARAM F/U: HCPCS | Performed by: FAMILY MEDICINE

## 2025-04-08 PROCEDURE — 3079F DIAST BP 80-89 MM HG: CPT | Performed by: FAMILY MEDICINE

## 2025-04-08 PROCEDURE — 1036F TOBACCO NON-USER: CPT | Performed by: FAMILY MEDICINE

## 2025-04-08 PROCEDURE — 3074F SYST BP LT 130 MM HG: CPT | Performed by: FAMILY MEDICINE

## 2025-04-08 RX ORDER — METOPROLOL SUCCINATE 100 MG/1
100 TABLET, EXTENDED RELEASE ORAL DAILY
Qty: 90 TABLET | Refills: 1 | Status: SHIPPED | OUTPATIENT
Start: 2025-04-08

## 2025-04-08 RX ORDER — ATORVASTATIN CALCIUM 40 MG/1
40 TABLET, FILM COATED ORAL
Qty: 90 TABLET | Refills: 1 | Status: SHIPPED | OUTPATIENT
Start: 2025-04-08

## 2025-04-08 ASSESSMENT — PATIENT HEALTH QUESTIONNAIRE - PHQ9
SUM OF ALL RESPONSES TO PHQ QUESTIONS 1-9: 0
1. LITTLE INTEREST OR PLEASURE IN DOING THINGS: NOT AT ALL
2. FEELING DOWN, DEPRESSED OR HOPELESS: NOT AT ALL
SUM OF ALL RESPONSES TO PHQ QUESTIONS 1-9: 0

## 2025-04-08 NOTE — PROGRESS NOTES
SUBJECTIVE  Chief Complaint   Patient presents with    Coronary Artery Disease    Hyperlipidemia    Hypertension      Patient presents for follow-up on their hypertension and hyperlipdiemia.      Has had right plantar fascia pain for the past 2 months.  Working on HEP and it is tolerable and improving.      HE had an A1c test done in March and it showed a mild elevation to 5.8%.   He has cut down on sugars.    We reviewed their cardiac risk factors.  He sees cardiology, soon transitioning to Dr. Singh. The patient has been taking medications as prescribed regularly and denies any side effects with the medication.  The patient denies any chest pain or chest tightness.  Denies any dyspnea upon exertion.      OBJECTIVE  Blood pressure 120/82, pulse 78, temperature 98.6 °F (37 °C), temperature source Skin, resp. rate 18, height 1.524 m (5'), weight 69.9 kg (154 lb), SpO2 100%.     General:  Alert, cooperative, well appearing, in no apparent distress.  CV:  The heart sounds are regular in rate and rhythm.  There is a normal S1 and S2.  There or no murmurs.  Lungs:  Inspiratory and expiratory efforts are full and unlabored.  Lung sounds are clear and equal to auscultation throughout all lung fields without wheezing, rales, or rhonchi.  Ext: no swelling.   Psych: normal affect.  Mood good.  Oriented x 3.  Judgement and insight intact.     ASSESSMENT / PLAN   Diagnosis Orders   1. Essential (primary) hypertension  metoprolol succinate (TOPROL XL) 100 MG extended release tablet    CBC with Auto Differential    Comprehensive Metabolic Panel    Lipid Panel      2. Mixed hyperlipidemia  atorvastatin (LIPITOR) 40 MG tablet    Comprehensive Metabolic Panel    Lipid Panel      3. Coronary artery disease due to lipid rich plaque        4. Prediabetes        5. Plantar fasciitis, right        6. Encounter for screening for malignant neoplasm of prostate  PSA Screening        HTN - controlled.  Cont per cardiology.  Refills of Toprol

## 2025-05-25 DIAGNOSIS — I10 ESSENTIAL (PRIMARY) HYPERTENSION: ICD-10-CM

## 2025-05-27 RX ORDER — METOPROLOL SUCCINATE 100 MG/1
100 TABLET, EXTENDED RELEASE ORAL DAILY
Qty: 90 TABLET | Refills: 1 | Status: SHIPPED | OUTPATIENT
Start: 2025-05-27